# Patient Record
Sex: FEMALE | Race: WHITE | HISPANIC OR LATINO | Employment: OTHER | URBAN - METROPOLITAN AREA
[De-identification: names, ages, dates, MRNs, and addresses within clinical notes are randomized per-mention and may not be internally consistent; named-entity substitution may affect disease eponyms.]

---

## 2017-01-06 ENCOUNTER — COMMUNICATION - HEALTHEAST (OUTPATIENT)
Dept: INTERNAL MEDICINE | Facility: CLINIC | Age: 77
End: 2017-01-06

## 2017-01-12 ENCOUNTER — HOSPITAL ENCOUNTER (OUTPATIENT)
Dept: MRI IMAGING | Facility: CLINIC | Age: 77
Discharge: HOME | End: 2017-01-12
Attending: RADIOLOGY

## 2017-01-12 DIAGNOSIS — D32.9 MENINGIOMA (H): ICD-10-CM

## 2017-01-16 ENCOUNTER — OFFICE VISIT - HEALTHEAST (OUTPATIENT)
Dept: RADIATION ONCOLOGY | Age: 77
End: 2017-01-16

## 2017-01-16 DIAGNOSIS — D32.9 MENINGIOMA (H): ICD-10-CM

## 2017-01-17 ENCOUNTER — COMMUNICATION - HEALTHEAST (OUTPATIENT)
Dept: INTERNAL MEDICINE | Facility: CLINIC | Age: 77
End: 2017-01-17

## 2017-01-17 DIAGNOSIS — E78.5 HYPERLIPIDEMIA: ICD-10-CM

## 2017-02-24 ENCOUNTER — COMMUNICATION - HEALTHEAST (OUTPATIENT)
Dept: INTERNAL MEDICINE | Facility: CLINIC | Age: 77
End: 2017-02-24

## 2017-06-05 ENCOUNTER — OFFICE VISIT - HEALTHEAST (OUTPATIENT)
Dept: INTERNAL MEDICINE | Facility: CLINIC | Age: 77
End: 2017-06-05

## 2017-06-05 DIAGNOSIS — R42 VERTIGO: ICD-10-CM

## 2017-06-05 DIAGNOSIS — D32.9 MENINGIOMA (H): ICD-10-CM

## 2017-06-05 ASSESSMENT — MIFFLIN-ST. JEOR: SCORE: 1062.72

## 2017-07-03 ENCOUNTER — OFFICE VISIT - HEALTHEAST (OUTPATIENT)
Dept: RADIATION ONCOLOGY | Age: 77
End: 2017-07-03

## 2017-07-03 DIAGNOSIS — D32.9 MENINGIOMA (H): ICD-10-CM

## 2017-11-08 ENCOUNTER — COMMUNICATION - HEALTHEAST (OUTPATIENT)
Dept: INTERNAL MEDICINE | Facility: CLINIC | Age: 77
End: 2017-11-08

## 2017-11-09 ENCOUNTER — COMMUNICATION - HEALTHEAST (OUTPATIENT)
Dept: INTERNAL MEDICINE | Facility: CLINIC | Age: 77
End: 2017-11-09

## 2017-11-09 ENCOUNTER — AMBULATORY - HEALTHEAST (OUTPATIENT)
Dept: INTERNAL MEDICINE | Facility: CLINIC | Age: 77
End: 2017-11-09

## 2018-01-29 ENCOUNTER — COMMUNICATION - HEALTHEAST (OUTPATIENT)
Dept: INTERNAL MEDICINE | Facility: CLINIC | Age: 78
End: 2018-01-29

## 2018-01-29 DIAGNOSIS — E78.5 HYPERLIPIDEMIA: ICD-10-CM

## 2018-05-20 ENCOUNTER — COMMUNICATION - HEALTHEAST (OUTPATIENT)
Dept: INTERNAL MEDICINE | Facility: CLINIC | Age: 78
End: 2018-05-20

## 2018-06-15 ENCOUNTER — RECORDS - HEALTHEAST (OUTPATIENT)
Dept: ADMINISTRATIVE | Facility: OTHER | Age: 78
End: 2018-06-15

## 2018-06-15 ENCOUNTER — COMMUNICATION - HEALTHEAST (OUTPATIENT)
Dept: INTERNAL MEDICINE | Facility: CLINIC | Age: 78
End: 2018-06-15

## 2018-06-15 DIAGNOSIS — R42 VERTIGO: ICD-10-CM

## 2018-06-15 DIAGNOSIS — D32.9 MENINGIOMA (H): ICD-10-CM

## 2018-06-20 ENCOUNTER — COMMUNICATION - HEALTHEAST (OUTPATIENT)
Dept: INTERNAL MEDICINE | Facility: CLINIC | Age: 78
End: 2018-06-20

## 2018-07-02 ENCOUNTER — HOSPITAL ENCOUNTER (OUTPATIENT)
Dept: MRI IMAGING | Facility: CLINIC | Age: 78
Discharge: HOME | End: 2018-07-02
Attending: RADIOLOGY

## 2018-07-02 DIAGNOSIS — D32.9 MENINGIOMA (H): ICD-10-CM

## 2018-07-02 LAB
CREAT BLD-MCNC: 0.8 MG/DL
POC GFR AMER AF HE - HISTORICAL: >60 ML/MIN/1.73M2
POC GFR NON AMER AF HE - HISTORICAL: >60 ML/MIN/1.73M2

## 2018-07-10 ENCOUNTER — OFFICE VISIT - HEALTHEAST (OUTPATIENT)
Dept: RADIATION ONCOLOGY | Facility: HOSPITAL | Age: 78
End: 2018-07-10

## 2018-07-10 DIAGNOSIS — D32.9 MENINGIOMA (H): ICD-10-CM

## 2018-07-23 ENCOUNTER — COMMUNICATION - HEALTHEAST (OUTPATIENT)
Dept: INTERNAL MEDICINE | Facility: CLINIC | Age: 78
End: 2018-07-23

## 2018-07-26 ENCOUNTER — OFFICE VISIT - HEALTHEAST (OUTPATIENT)
Dept: INTERNAL MEDICINE | Facility: CLINIC | Age: 78
End: 2018-07-26

## 2018-07-26 DIAGNOSIS — D32.9 MENINGIOMA (H): ICD-10-CM

## 2018-07-26 DIAGNOSIS — H81.10 BPPV (BENIGN PAROXYSMAL POSITIONAL VERTIGO): ICD-10-CM

## 2018-07-26 ASSESSMENT — MIFFLIN-ST. JEOR: SCORE: 1062.36

## 2018-07-27 ENCOUNTER — COMMUNICATION - HEALTHEAST (OUTPATIENT)
Dept: INTERNAL MEDICINE | Facility: CLINIC | Age: 78
End: 2018-07-27

## 2018-07-28 ENCOUNTER — COMMUNICATION - HEALTHEAST (OUTPATIENT)
Dept: INTERNAL MEDICINE | Facility: CLINIC | Age: 78
End: 2018-07-28

## 2018-07-28 DIAGNOSIS — E78.5 HYPERLIPIDEMIA: ICD-10-CM

## 2018-10-08 ENCOUNTER — RECORDS - HEALTHEAST (OUTPATIENT)
Dept: ADMINISTRATIVE | Facility: OTHER | Age: 78
End: 2018-10-08

## 2018-10-10 ENCOUNTER — COMMUNICATION - HEALTHEAST (OUTPATIENT)
Dept: INTERNAL MEDICINE | Facility: CLINIC | Age: 78
End: 2018-10-10

## 2018-10-22 ENCOUNTER — HOSPITAL ENCOUNTER (OUTPATIENT)
Dept: RADIOLOGY | Facility: CLINIC | Age: 78
Discharge: HOME | End: 2018-10-22
Attending: PHYSICIAN ASSISTANT

## 2018-10-22 DIAGNOSIS — R13.10 DYSPHAGIA: ICD-10-CM

## 2018-11-14 ENCOUNTER — OFFICE VISIT - HEALTHEAST (OUTPATIENT)
Dept: INTERNAL MEDICINE | Facility: CLINIC | Age: 78
End: 2018-11-14

## 2018-11-14 ENCOUNTER — HOSPITAL ENCOUNTER (OUTPATIENT)
Dept: LAB | Age: 78
Setting detail: SPECIMEN
Discharge: HOME | End: 2018-11-14

## 2018-11-14 DIAGNOSIS — I69.30 SEQUELA OF LACUNAR INFARCTION: ICD-10-CM

## 2018-11-14 DIAGNOSIS — R42 VERTIGO: ICD-10-CM

## 2018-11-14 DIAGNOSIS — K22.2 ESOPHAGEAL STRICTURE: ICD-10-CM

## 2018-11-14 DIAGNOSIS — D32.9 MENINGIOMA (H): ICD-10-CM

## 2018-11-14 DIAGNOSIS — R10.31 RIGHT INGUINAL PAIN: ICD-10-CM

## 2018-11-14 DIAGNOSIS — Z00.00 ENCOUNTER FOR MEDICARE ANNUAL WELLNESS EXAM: ICD-10-CM

## 2018-11-14 DIAGNOSIS — K21.9 GASTROESOPHAGEAL REFLUX DISEASE, ESOPHAGITIS PRESENCE NOT SPECIFIED: ICD-10-CM

## 2018-11-14 LAB
ALBUMIN SERPL-MCNC: 3.9 G/DL (ref 3.5–5)
ALBUMIN UR-MCNC: NEGATIVE MG/DL
ALP SERPL-CCNC: 90 U/L (ref 45–120)
ALT SERPL W P-5'-P-CCNC: 18 U/L (ref 0–45)
ANION GAP SERPL CALCULATED.3IONS-SCNC: 8 MMOL/L (ref 5–18)
APPEARANCE UR: CLEAR
AST SERPL W P-5'-P-CCNC: 27 U/L (ref 0–40)
BACTERIA #/AREA URNS HPF: ABNORMAL HPF
BASOPHILS # BLD AUTO: 0 THOU/UL (ref 0–0.2)
BASOPHILS NFR BLD AUTO: 0 % (ref 0–2)
BILIRUB DIRECT SERPL-MCNC: 0.2 MG/DL
BILIRUB SERPL-MCNC: 0.5 MG/DL (ref 0–1)
BILIRUB UR QL STRIP: NEGATIVE
BUN SERPL-MCNC: 18 MG/DL (ref 8–28)
CALCIUM SERPL-MCNC: 9.6 MG/DL (ref 8.5–10.5)
CHLORIDE BLD-SCNC: 106 MMOL/L (ref 98–107)
CHOLEST SERPL-MCNC: 196 MG/DL
CO2 SERPL-SCNC: 29 MMOL/L (ref 22–31)
COLOR UR AUTO: YELLOW
CREAT SERPL-MCNC: 0.79 MG/DL (ref 0.6–1.1)
EOSINOPHIL # BLD AUTO: 0.1 THOU/UL (ref 0–0.4)
EOSINOPHIL NFR BLD AUTO: 4 % (ref 0–6)
ERYTHROCYTE [DISTWIDTH] IN BLOOD BY AUTOMATED COUNT: 12 % (ref 11–14.5)
ERYTHROCYTE [SEDIMENTATION RATE] IN BLOOD BY WESTERGREN METHOD: 16 MM/HR (ref 0–20)
FASTING STATUS PATIENT QL REPORTED: YES
GFR SERPL CREATININE-BSD FRML MDRD: >60 ML/MIN/1.73M2
GLUCOSE BLD-MCNC: 97 MG/DL (ref 70–125)
GLUCOSE UR STRIP-MCNC: NEGATIVE MG/DL
HCT VFR BLD AUTO: 41.3 % (ref 35–47)
HDLC SERPL-MCNC: 51 MG/DL
HGB BLD-MCNC: 13.7 G/DL (ref 12–16)
HGB UR QL STRIP: ABNORMAL
KETONES UR STRIP-MCNC: NEGATIVE MG/DL
LDLC SERPL CALC-MCNC: 126 MG/DL
LEUKOCYTE ESTERASE UR QL STRIP: ABNORMAL
LYMPHOCYTES # BLD AUTO: 1.2 THOU/UL (ref 0.8–4.4)
LYMPHOCYTES NFR BLD AUTO: 34 % (ref 20–40)
MCH RBC QN AUTO: 31.8 PG (ref 27–34)
MCHC RBC AUTO-ENTMCNC: 33.1 G/DL (ref 32–36)
MCV RBC AUTO: 96 FL (ref 80–100)
MONOCYTES # BLD AUTO: 0.5 THOU/UL (ref 0–0.9)
MONOCYTES NFR BLD AUTO: 13 % (ref 2–10)
NEUTROPHILS # BLD AUTO: 1.7 THOU/UL (ref 2–7.7)
NEUTROPHILS NFR BLD AUTO: 49 % (ref 50–70)
NITRATE UR QL: NEGATIVE
PH UR STRIP: 7 [PH] (ref 5–8)
PLATELET # BLD AUTO: 163 THOU/UL (ref 140–440)
PMV BLD AUTO: 8.6 FL (ref 7–10)
POTASSIUM BLD-SCNC: 4.6 MMOL/L (ref 3.5–5)
PROT SERPL-MCNC: 6.8 G/DL (ref 6–8)
RBC # BLD AUTO: 4.3 MILL/UL (ref 3.8–5.4)
RBC #/AREA URNS AUTO: ABNORMAL HPF
SODIUM SERPL-SCNC: 143 MMOL/L (ref 136–145)
SP GR UR STRIP: 1.02 (ref 1–1.03)
SQUAMOUS #/AREA URNS AUTO: ABNORMAL LPF
TRIGL SERPL-MCNC: 93 MG/DL
TSH SERPL DL<=0.005 MIU/L-ACNC: 1.37 UIU/ML (ref 0.3–5)
UROBILINOGEN UR STRIP-ACNC: ABNORMAL
WBC #/AREA URNS AUTO: ABNORMAL HPF
WBC: 3.4 THOU/UL (ref 4–11)

## 2018-11-14 ASSESSMENT — MIFFLIN-ST. JEOR: SCORE: 1045.53

## 2018-11-15 LAB — BACTERIA SPEC CULT: NO GROWTH

## 2018-11-16 ENCOUNTER — RECORDS - HEALTHEAST (OUTPATIENT)
Dept: ADMINISTRATIVE | Facility: OTHER | Age: 78
End: 2018-11-16

## 2018-11-18 ENCOUNTER — COMMUNICATION - HEALTHEAST (OUTPATIENT)
Dept: INTERNAL MEDICINE | Facility: CLINIC | Age: 78
End: 2018-11-18

## 2018-11-21 ENCOUNTER — RECORDS - HEALTHEAST (OUTPATIENT)
Dept: ADMINISTRATIVE | Facility: OTHER | Age: 78
End: 2018-11-21

## 2018-12-03 ENCOUNTER — COMMUNICATION - HEALTHEAST (OUTPATIENT)
Dept: INTERNAL MEDICINE | Facility: CLINIC | Age: 78
End: 2018-12-03

## 2019-01-14 ENCOUNTER — RECORDS - HEALTHEAST (OUTPATIENT)
Dept: ADMINISTRATIVE | Facility: OTHER | Age: 79
End: 2019-01-14

## 2019-01-15 ENCOUNTER — RECORDS - HEALTHEAST (OUTPATIENT)
Dept: ADMINISTRATIVE | Facility: OTHER | Age: 79
End: 2019-01-15

## 2019-01-16 ENCOUNTER — RECORDS - HEALTHEAST (OUTPATIENT)
Dept: ADMINISTRATIVE | Facility: OTHER | Age: 79
End: 2019-01-16

## 2019-01-24 ENCOUNTER — COMMUNICATION - HEALTHEAST (OUTPATIENT)
Dept: INTERNAL MEDICINE | Facility: CLINIC | Age: 79
End: 2019-01-24

## 2019-01-24 DIAGNOSIS — K44.9 HIATAL HERNIA: ICD-10-CM

## 2019-01-24 DIAGNOSIS — R13.10 DYSPHAGIA: ICD-10-CM

## 2019-01-25 ENCOUNTER — AMBULATORY - HEALTHEAST (OUTPATIENT)
Dept: INTERNAL MEDICINE | Facility: CLINIC | Age: 79
End: 2019-01-25

## 2019-01-25 DIAGNOSIS — R10.9 CHRONIC ABDOMINAL PAIN: ICD-10-CM

## 2019-01-25 DIAGNOSIS — G89.29 CHRONIC ABDOMINAL PAIN: ICD-10-CM

## 2019-03-14 ENCOUNTER — OFFICE VISIT - HEALTHEAST (OUTPATIENT)
Dept: INTERNAL MEDICINE | Facility: CLINIC | Age: 79
End: 2019-03-14

## 2019-03-14 DIAGNOSIS — F43.21 GRIEF: ICD-10-CM

## 2019-03-14 DIAGNOSIS — K22.4 ESOPHAGEAL DYSFUNCTION: ICD-10-CM

## 2019-03-14 DIAGNOSIS — N83.209 CYST OF OVARY, UNSPECIFIED LATERALITY: ICD-10-CM

## 2019-03-14 DIAGNOSIS — K29.50 CHRONIC GASTRITIS WITHOUT BLEEDING, UNSPECIFIED GASTRITIS TYPE: ICD-10-CM

## 2019-03-14 DIAGNOSIS — E78.5 HYPERLIPIDEMIA, UNSPECIFIED HYPERLIPIDEMIA TYPE: ICD-10-CM

## 2019-03-14 ASSESSMENT — MIFFLIN-ST. JEOR: SCORE: 1050.43

## 2019-03-22 ENCOUNTER — COMMUNICATION - HEALTHEAST (OUTPATIENT)
Dept: INTERNAL MEDICINE | Facility: CLINIC | Age: 79
End: 2019-03-22

## 2019-06-09 ENCOUNTER — COMMUNICATION - HEALTHEAST (OUTPATIENT)
Dept: INTERNAL MEDICINE | Facility: CLINIC | Age: 79
End: 2019-06-09

## 2019-06-09 DIAGNOSIS — G47.00 INSOMNIA, UNSPECIFIED TYPE: ICD-10-CM

## 2019-07-05 ENCOUNTER — HOSPITAL ENCOUNTER (OUTPATIENT)
Dept: MRI IMAGING | Facility: CLINIC | Age: 79
Setting detail: RADIATION/ONCOLOGY SERIES
Discharge: STILL ADMITTED OR ADMINISTRATIVE | End: 2019-07-05
Attending: RADIOLOGY

## 2019-07-05 DIAGNOSIS — D32.9 MENINGIOMA (H): ICD-10-CM

## 2019-07-05 LAB
CREAT BLD-MCNC: 0.7 MG/DL (ref 0.6–1.1)
GFR SERPL CREATININE-BSD FRML MDRD: >60 ML/MIN/1.73M2

## 2019-07-09 ENCOUNTER — OFFICE VISIT - HEALTHEAST (OUTPATIENT)
Dept: RADIATION ONCOLOGY | Facility: HOSPITAL | Age: 79
End: 2019-07-09

## 2019-07-09 DIAGNOSIS — D32.9 MENINGIOMA (H): ICD-10-CM

## 2019-07-24 ENCOUNTER — RECORDS - HEALTHEAST (OUTPATIENT)
Dept: ADMINISTRATIVE | Facility: OTHER | Age: 79
End: 2019-07-24

## 2019-08-02 ENCOUNTER — COMMUNICATION - HEALTHEAST (OUTPATIENT)
Dept: INTERNAL MEDICINE | Facility: CLINIC | Age: 79
End: 2019-08-02

## 2019-08-02 DIAGNOSIS — E78.5 HYPERLIPIDEMIA: ICD-10-CM

## 2019-09-26 ENCOUNTER — RECORDS - HEALTHEAST (OUTPATIENT)
Dept: ADMINISTRATIVE | Facility: OTHER | Age: 79
End: 2019-09-26

## 2019-12-12 ENCOUNTER — COMMUNICATION - HEALTHEAST (OUTPATIENT)
Dept: INTERNAL MEDICINE | Facility: CLINIC | Age: 79
End: 2019-12-12

## 2019-12-12 DIAGNOSIS — G47.00 INSOMNIA, UNSPECIFIED TYPE: ICD-10-CM

## 2020-01-10 ENCOUNTER — OFFICE VISIT - HEALTHEAST (OUTPATIENT)
Dept: INTERNAL MEDICINE | Facility: CLINIC | Age: 80
End: 2020-01-10

## 2020-01-10 DIAGNOSIS — N83.209 CYST OF OVARY, UNSPECIFIED LATERALITY: ICD-10-CM

## 2020-01-10 DIAGNOSIS — D32.9 MENINGIOMA (H): ICD-10-CM

## 2020-01-10 DIAGNOSIS — Z00.00 MEDICARE ANNUAL WELLNESS VISIT, SUBSEQUENT: ICD-10-CM

## 2020-01-10 DIAGNOSIS — K22.4 ESOPHAGEAL DYSFUNCTION: ICD-10-CM

## 2020-01-10 DIAGNOSIS — E78.5 HYPERLIPIDEMIA, UNSPECIFIED HYPERLIPIDEMIA TYPE: ICD-10-CM

## 2020-01-10 DIAGNOSIS — F51.04 CHRONIC INSOMNIA: ICD-10-CM

## 2020-01-10 DIAGNOSIS — Z83.3 FAMILY HISTORY OF DIABETES MELLITUS: ICD-10-CM

## 2020-01-10 LAB
ALBUMIN SERPL-MCNC: 4 G/DL (ref 3.5–5)
ALBUMIN UR-MCNC: NEGATIVE MG/DL
ALP SERPL-CCNC: 77 U/L (ref 45–120)
ALT SERPL W P-5'-P-CCNC: 12 U/L (ref 0–45)
ANION GAP SERPL CALCULATED.3IONS-SCNC: 8 MMOL/L (ref 5–18)
APPEARANCE UR: CLEAR
AST SERPL W P-5'-P-CCNC: 20 U/L (ref 0–40)
BACTERIA #/AREA URNS HPF: ABNORMAL HPF
BASOPHILS # BLD AUTO: 0 THOU/UL (ref 0–0.2)
BASOPHILS NFR BLD AUTO: 0 % (ref 0–2)
BILIRUB DIRECT SERPL-MCNC: 0.2 MG/DL
BILIRUB SERPL-MCNC: 0.6 MG/DL (ref 0–1)
BILIRUB UR QL STRIP: NEGATIVE
BUN SERPL-MCNC: 15 MG/DL (ref 8–28)
CALCIUM SERPL-MCNC: 9.5 MG/DL (ref 8.5–10.5)
CHLORIDE BLD-SCNC: 107 MMOL/L (ref 98–107)
CHOLEST SERPL-MCNC: 193 MG/DL
CO2 SERPL-SCNC: 26 MMOL/L (ref 22–31)
COLOR UR AUTO: YELLOW
CREAT SERPL-MCNC: 0.8 MG/DL (ref 0.6–1.1)
EOSINOPHIL # BLD AUTO: 0.1 THOU/UL (ref 0–0.4)
EOSINOPHIL NFR BLD AUTO: 2 % (ref 0–6)
ERYTHROCYTE [DISTWIDTH] IN BLOOD BY AUTOMATED COUNT: 12.2 % (ref 11–14.5)
ERYTHROCYTE [SEDIMENTATION RATE] IN BLOOD BY WESTERGREN METHOD: 11 MM/HR (ref 0–20)
FASTING STATUS PATIENT QL REPORTED: YES
GFR SERPL CREATININE-BSD FRML MDRD: >60 ML/MIN/1.73M2
GLUCOSE BLD-MCNC: 92 MG/DL (ref 70–125)
GLUCOSE UR STRIP-MCNC: NEGATIVE MG/DL
HBA1C MFR BLD: 5.8 % (ref 3.5–6)
HCT VFR BLD AUTO: 39.4 % (ref 35–47)
HDLC SERPL-MCNC: 45 MG/DL
HGB BLD-MCNC: 13.6 G/DL (ref 12–16)
HGB UR QL STRIP: ABNORMAL
KETONES UR STRIP-MCNC: NEGATIVE MG/DL
LDLC SERPL CALC-MCNC: 122 MG/DL
LEUKOCYTE ESTERASE UR QL STRIP: ABNORMAL
LYMPHOCYTES # BLD AUTO: 1.4 THOU/UL (ref 0.8–4.4)
LYMPHOCYTES NFR BLD AUTO: 36 % (ref 20–40)
MCH RBC QN AUTO: 34 PG (ref 27–34)
MCHC RBC AUTO-ENTMCNC: 34.6 G/DL (ref 32–36)
MCV RBC AUTO: 98 FL (ref 80–100)
MONOCYTES # BLD AUTO: 0.4 THOU/UL (ref 0–0.9)
MONOCYTES NFR BLD AUTO: 11 % (ref 2–10)
MUCOUS THREADS #/AREA URNS LPF: ABNORMAL LPF
NEUTROPHILS # BLD AUTO: 2 THOU/UL (ref 2–7.7)
NEUTROPHILS NFR BLD AUTO: 50 % (ref 50–70)
NITRATE UR QL: NEGATIVE
PH UR STRIP: 6 [PH] (ref 4.5–8)
PLATELET # BLD AUTO: 142 THOU/UL (ref 140–440)
PMV BLD AUTO: 7.9 FL (ref 7–10)
POTASSIUM BLD-SCNC: 4.4 MMOL/L (ref 3.5–5)
PROT SERPL-MCNC: 7.2 G/DL (ref 6–8)
RBC # BLD AUTO: 4.01 MILL/UL (ref 3.8–5.4)
RBC #/AREA URNS AUTO: ABNORMAL HPF
SODIUM SERPL-SCNC: 141 MMOL/L (ref 136–145)
SP GR UR STRIP: 1.01 (ref 1–1.03)
SQUAMOUS #/AREA URNS AUTO: ABNORMAL LPF
TRIGL SERPL-MCNC: 131 MG/DL
TSH SERPL DL<=0.005 MIU/L-ACNC: 1.46 UIU/ML (ref 0.3–5)
UROBILINOGEN UR STRIP-ACNC: ABNORMAL
WBC #/AREA URNS AUTO: ABNORMAL HPF
WBC: 3.9 THOU/UL (ref 4–11)

## 2020-01-10 ASSESSMENT — MIFFLIN-ST. JEOR: SCORE: 1040.99

## 2020-01-11 LAB — BACTERIA SPEC CULT: NO GROWTH

## 2020-01-12 ENCOUNTER — COMMUNICATION - HEALTHEAST (OUTPATIENT)
Dept: INTERNAL MEDICINE | Facility: CLINIC | Age: 80
End: 2020-01-12

## 2020-01-31 ENCOUNTER — COMMUNICATION - HEALTHEAST (OUTPATIENT)
Dept: INTERNAL MEDICINE | Facility: CLINIC | Age: 80
End: 2020-01-31

## 2020-01-31 DIAGNOSIS — E78.5 HYPERLIPIDEMIA: ICD-10-CM

## 2020-04-23 ENCOUNTER — COMMUNICATION - HEALTHEAST (OUTPATIENT)
Dept: INTERNAL MEDICINE | Facility: CLINIC | Age: 80
End: 2020-04-23

## 2020-04-23 DIAGNOSIS — G47.00 INSOMNIA, UNSPECIFIED TYPE: ICD-10-CM

## 2020-04-28 ENCOUNTER — COMMUNICATION - HEALTHEAST (OUTPATIENT)
Dept: INTERNAL MEDICINE | Facility: CLINIC | Age: 80
End: 2020-04-28

## 2020-08-04 ENCOUNTER — HOSPITAL ENCOUNTER (OUTPATIENT)
Dept: MRI IMAGING | Facility: CLINIC | Age: 80
Discharge: HOME | End: 2020-08-04
Attending: RADIOLOGY

## 2020-08-04 DIAGNOSIS — D32.9 MENINGIOMA (H): ICD-10-CM

## 2020-08-04 LAB
CREAT BLD-MCNC: 0.9 MG/DL (ref 0.6–1.1)
GFR SERPL CREATININE-BSD FRML MDRD: 60 ML/MIN/1.73M2

## 2020-08-10 ENCOUNTER — COMMUNICATION - HEALTHEAST (OUTPATIENT)
Dept: RADIATION ONCOLOGY | Facility: HOSPITAL | Age: 80
End: 2020-08-10

## 2020-08-10 ENCOUNTER — AMBULATORY - HEALTHEAST (OUTPATIENT)
Dept: RADIATION ONCOLOGY | Facility: HOSPITAL | Age: 80
End: 2020-08-10

## 2020-08-10 DIAGNOSIS — D32.9 MENINGIOMA (H): ICD-10-CM

## 2020-11-09 ENCOUNTER — COMMUNICATION - HEALTHEAST (OUTPATIENT)
Dept: INTERNAL MEDICINE | Facility: CLINIC | Age: 80
End: 2020-11-09

## 2020-11-09 DIAGNOSIS — G47.00 INSOMNIA, UNSPECIFIED TYPE: ICD-10-CM

## 2020-11-24 ENCOUNTER — OFFICE VISIT - HEALTHEAST (OUTPATIENT)
Dept: INTERNAL MEDICINE | Facility: CLINIC | Age: 80
End: 2020-11-24

## 2020-11-24 ENCOUNTER — RECORDS - HEALTHEAST (OUTPATIENT)
Dept: ADMINISTRATIVE | Facility: OTHER | Age: 80
End: 2020-11-24

## 2020-11-24 ENCOUNTER — COMMUNICATION - HEALTHEAST (OUTPATIENT)
Dept: INTERNAL MEDICINE | Facility: CLINIC | Age: 80
End: 2020-11-24

## 2020-11-24 DIAGNOSIS — Z00.00 HEALTH MAINTENANCE EXAMINATION: ICD-10-CM

## 2020-11-24 DIAGNOSIS — G47.00 INSOMNIA, UNSPECIFIED TYPE: ICD-10-CM

## 2020-11-24 DIAGNOSIS — D32.9 MENINGIOMA (H): ICD-10-CM

## 2020-11-24 RX ORDER — TEMAZEPAM 15 MG/1
CAPSULE ORAL
Qty: 90 CAPSULE | Refills: 3 | Status: SHIPPED | OUTPATIENT
Start: 2020-11-24 | End: 2021-12-20

## 2020-11-24 ASSESSMENT — PATIENT HEALTH QUESTIONNAIRE - PHQ9: SUM OF ALL RESPONSES TO PHQ QUESTIONS 1-9: 0

## 2021-01-28 ENCOUNTER — COMMUNICATION - HEALTHEAST (OUTPATIENT)
Dept: FAMILY MEDICINE | Facility: CLINIC | Age: 81
End: 2021-01-28

## 2021-01-28 DIAGNOSIS — E78.5 HYPERLIPIDEMIA: ICD-10-CM

## 2021-01-28 RX ORDER — PRAVASTATIN SODIUM 20 MG
20 TABLET ORAL DAILY
Qty: 90 TABLET | Refills: 3 | Status: SHIPPED | OUTPATIENT
Start: 2021-01-28 | End: 2022-01-26

## 2021-02-04 ENCOUNTER — AMBULATORY - HEALTHEAST (OUTPATIENT)
Dept: NURSING | Facility: CLINIC | Age: 81
End: 2021-02-04

## 2021-02-25 ENCOUNTER — AMBULATORY - HEALTHEAST (OUTPATIENT)
Dept: NURSING | Facility: CLINIC | Age: 81
End: 2021-02-25

## 2021-05-26 NOTE — TELEPHONE ENCOUNTER
Spoke with the patient and she states that she didn't care who she saw for a pre-op.  Was able to schedule the patient for a pre-op with Dr. Son for Monday, 3/25/19 at 1:40 pm.  She verbalized understanding and had no further questions at this time.  Julianna MEDELLIN CMA/PRABHA....................10:41 AM

## 2021-05-26 NOTE — TELEPHONE ENCOUNTER
New Appointment Needed  What is the reason for the visit:    Pre-Op Appt Request  When is the surgery? :  03/27/19  Where is the surgery?:   [facility]  Who is the surgeon? :  Dr. Kathy Hinojosa  What type of surgery is being done?: Hiatal hernia repair  Provider Preference: Any available  How soon do you need to be seen?: 03/25 or 03/26  Waitlist offered?: No  Okay to leave a detailed message:  Yes

## 2021-05-27 ASSESSMENT — PATIENT HEALTH QUESTIONNAIRE - PHQ9: SUM OF ALL RESPONSES TO PHQ QUESTIONS 1-9: 0

## 2021-05-29 NOTE — TELEPHONE ENCOUNTER
Controlled Substance Refill Request  Medication:   Requested Prescriptions     Pending Prescriptions Disp Refills     temazepam (RESTORIL) 15 mg capsule [Pharmacy Med Name: TEMAZEPAM 15MG CAPSULES] 30 capsule 0     Sig: TAKE 1 CAPSULE(15 MG) BY MOUTH AT BEDTIME AS NEEDED FOR SLEEP     Date Last Fill: 12/5/18  [facility] [address] [place] [address]   Submit electronically to pharmacy  Controlled Substance Agreement on File:   Encounter-Level CSA Scan Date:    There are no encounter-level csa scan date.       Last office visit: Last office visit pertaining to requested medication was 11/30/2016.  Alisa Garcia RN, MA  [facility] Care Connection RN Triage Nurse Advisor

## 2021-05-29 NOTE — TELEPHONE ENCOUNTER
Prescription Monitoring Program activity reviewed with no discrepancies noted.      Last fill per : 12/05/18  Quantity/days supply: #30 for a 30 day supply    Controlled Substance Agreement on file: No  Date:     Last office visit with provider:  3/14/2019 Conner Cordero MD    Please advise.

## 2021-05-30 VITALS — HEIGHT: 62 IN | BODY MASS INDEX: 26.15 KG/M2 | WEIGHT: 142.08 LBS

## 2021-05-30 VITALS — BODY MASS INDEX: 26.36 KG/M2 | WEIGHT: 141.8 LBS

## 2021-05-30 NOTE — PROGRESS NOTES
[facility] Radiation Oncology Follow Up Note    Patient: Emili Man  MRN: [id number]  Date of Service: 07/09/2019    Assessment / Impression     1. Meningioma (right para clinoid)         Cancer Staging  No matching staging information was found for the patient.  ECOG Peformance Status  ECOG Performance Status: 0  Distress Assessment Score  Distress Assessment Score: No distress  Body site: Brain    Plan:   78 y.o. S/P CK radiation to right paraclinoid region, 2500 cGy/5 fractions completed on 7/8/15.     1. MRI Head w/wo contrast 7/5/2019, tumor remains stable with patent vessels. Imaging results discussed with patient.  2. Return in 1 year for routine MR head w/wo contrast and office visit.   3. Describing light headedness and dizziness in the morning, worsening with bending over. Not similar to her previous vertiginous episodes. Also some voice hoarseness. Transit defect noted mid esophagus function testing from 2/2019. Chronic gastritis seen on endoscopy on 1/14/2019. Possibly related to GI symptoms, follow up with GI clinic. Nothing to suggest inner ear problem today in clinic today or on MRI head.    Face to face time  50 minutes with > 75% spent on consultation, education and coordination of care.    Subjective:      HPI: Emili Man is a 78 y.o. female was treated with Cyberknife stereotactic radiosurgery for a meningioma located in right prerclinoid region 2.5x1.8x2.2 cm adjacent to chiasm, right optic nerve, encasing regional arteries and invasive locally precluding surgical intervention.       SITE TREATED: right para clinoid region    TOTAL DOSE: 2500    NUMBER OF FRACTION: 5    DATES COMPLETED: 7/8/2015    Emili tolerated the treatment without unexpected sides effects.   No visual sx. Had left cataract removed with trifocal replacement. Had the other eye done and is very happy with result. No visual changes.  Scan 1/12/2017 shows stable to slightly smaller. Vessels patent.      Patient was seen  in ER recently for vertiginous episode. Normal exam. Scan 5/23/2017 showed tumor stability and vessels remain patent. So no obvious change on scan to explain sx.  None since.      The patient returns for routine follow up. She describes feeling off balance and light headedness, similar to her vertiginous episodes but less severe, which occasionally occur in the morning. Worsening when bending over. Also some voice hoarseness. No dysphagia. No focal weakness or vertiginous episodes similar to past. MR head 7/5/2018 showing stable tumor with patent vessels.      Current Outpatient Medications   Medication Sig Dispense Refill     pravastatin (PRAVACHOL) 20 MG tablet Take 1 tablet (20 mg total) by mouth daily. 90 tablet 3     temazepam (RESTORIL) 15 mg capsule TAKE 1 CAPSULE(15 MG) BY MOUTH AT BEDTIME AS NEEDED FOR SLEEP 30 capsule 0     No current facility-administered medications for this visit.        The following portions of the patient's history were reviewed and updated as appropriate: allergies, current medications, past family history, past medical history, past social history, past surgical history and problem list.    Review of Systems    General  Constitutional (WDL): All constitutional elements are within defined limits  EENT  Eye Disorder (WDL): All eye disorder elements are within defined limits  Ear Disorder (WDL): All ear disorder elements are within defined limits  Respiratory       Respiratory (WDL): Within Defined Limits  Cardiovascular  Cardiovascular (WDL): All cardiovascular elements are within defined limits  Endocrine     Gastrointestinal  Gastrointestinal (WDL): All gastrointestinal elements are within defined limits  Musculoskeletal  Musculoskeletal and Connetive Tissue Disorders (WDL): All Musculoskeletal and Connetive Tissue Disorder elements are within defined limits  Integumentary               Integumentary (WDL): All integumentary elements are within defined  limits  Neurological  Neurosensory (WDL): Exceptions to WDL  Dizziness: Mild unsteadiness or sensation of movement(occasional, not interfering with ADLs)  Psychological/Emotional   Patient Coping: Accepting  Hematological/Lymphatic  Lymph (WDL): All lymph disorder elements are within defined limits  Dermatologic     Genitourinary/Reproductive  Genitourinary (WDL): All genitourinary elements are within defined limits  Reproductive     Pain                 AUA Assessment                                  Accompanied by  Accompanied by: Alone      Objective:     Physical Exam    Vitals:    07/09/19 1531   BP: 139/77   Pulse: 67   Weight: 135 lb 12.8 oz (61.6 kg)        GENERAL: No acute distress. Cooperative in conversation.   HEENT: Pupils are equal, round and reactive. Oromucosa is clean and intact. No ulcerations or mucositis noted. No bleeding noted.  RESP: Normal respiratory rate.  CV: Regular, rate and rhythm.  ABD: Soft, nontender.  MUSCULOSKELETAL: No palpable points of tenderness.   PSYCH: Within normal limits. No depression or anxiety.  SKIN: Warm dry intact.   LYMPH: No cervical, supraclavicular lymphadenopathy.  EXTREMITIES: No edema .  NEUROLOGIC: CNIII-XII symmetric, normal strength, sensation and reflexes throughout, gait normal. Dysmetria not present.       Recent Labs:   Recent Results (from the past 168 hour(s))   POCT CREATININE   Result Value Ref Range    iSTAT Creatinine 0.7 0.6 - 1.1 mg/dL    iSTAT GFR MDRD Af Amer >60 >60 mL/min/1.73m2    iSTAT GFR MDRD Non Af Amer >60 >60 mL/min/1.73m2       Imaging: Imaging results 30 days: Mr Brain With Without Contrast    Result Date: 7/5/2019  EXAM: MR BRAIN W WO CONTRAST LOCATION: [facility] DATE/TIME: 7/5/2019 11:47 AM INDICATION: Neoplasm: head, CNS, rx monitor or f/u meningioma s/p Cyber please comment on proximity to optic chiasm. COMPARISON: Brain MRI 07/02/2018. CONTRAST: 6 mL Gadavist. TECHNIQUE: Multiplanar multisequence head MRI without  and with intravenous contrast including dynamic susceptibility contrast perfusion imaging. FINDINGS: INTRACRANIAL CONTENTS: No acute or subacute infarct. Enhancing extra axial mass centered at the right anterior clinoid process measuring 2.8 x 1.7 x 2.5 cm, previously 2.7 x 1.8 x 2.4 cm, not significantly changed allowing for differences in imaging plane. Hyperostosis of the clinoid process. This is again noted to encase the supraclinoid internal carotid artery and proximal M1 segment. Mass effect with mild deformity of the inferior frontal lobe. The medial margin of the mass abuts the lateral aspect of the optic chiasm with mild deformity. It also abuts the cisternal segment of the right optic nerve. These findings are not significantly changed. Suggestion of mild elevated cerebral blood volume on perfusion imaging. Normal brain parenchymal signal for age. Mild generalized cerebral atrophy. No hydrocephalus. Chronic lacunar infarction left inferior cerebellar hemisphere. SELLA: No significant abnormality accounting for technique. BONES/SOFT TISSUES: Redemonstration of a degenerative cyst at the base of the odontoid. Thinning of the posterior cortex of the base of the odontoid appears improved compared to the previous study. Persistent retro odontoid pannus. The major intracranial  vascular flow voids are maintained. ORBITS: Prior bilateral cataract surgery. Visualized portions of the orbits are otherwise unremarkable. SINUSES/MASTOIDS: No significant paranasal sinus mucosal disease. No significant middle ear or mastoid effusion.     CONCLUSION: 1.  Unchanged presumed right anterior clinoid meningioma measuring 2.8 x 1.7 x 2.5 cm. Unchanged encasement of the right supraclinoid internal carotid artery and M1 segment with preservation of flow voids. The medial margin of the mass abuts and mildly deforms the optic chiasm and also abuts the cisternal segment of the right optic nerve. Mild deformity of the inferior  frontal lobe.       I, Damaris Hoang MD personally performed the services described in this documentation, as scribed by Ariel Lowe in my presence, and it is both accurate and complete.    Signed by: Damaris Hoang MD, MPH

## 2021-05-30 NOTE — PROGRESS NOTES
Pt here ambulatory by herself for her annual f/u on her meningioma, c/o occasionally dizziness but otherwise no neuro changes or symptoms.

## 2021-05-31 NOTE — TELEPHONE ENCOUNTER
Refill Approved    Rx renewed per Medication Renewal Policy. Medication was last renewed on 7/29/18.    Natalia Ramos, [facility] Triage/Med Refill 8/2/2019     Requested Prescriptions   Pending Prescriptions Disp Refills     pravastatin (PRAVACHOL) 20 MG tablet [Pharmacy Med Name: PRAVASTATIN 20MG TABLETS] 90 tablet 0     Sig: TAKE 1 TABLET(20 MG) BY MOUTH DAILY       Statins Refill Protocol (Hmg CoA Reductase Inhibitors) Passed - 8/2/2019  9:12 AM        Passed - PCP or prescribing provider visit in past 12 months      Last office visit with prescriber/PCP: 3/14/2019 Conner Cordero MD OR same dept: 3/14/2019 Conner Cordero MD OR same specialty: 3/14/2019 Conner Cordero MD  Last physical: 11/14/2018 Last MTM visit: Visit date not found   Next visit within 3 mo: Visit date not found  Next physical within 3 mo: Visit date not found  Prescriber OR PCP: Conner Cordero MD  Last diagnosis associated with med order: 1. Hyperlipidemia  - pravastatin (PRAVACHOL) 20 MG tablet [Pharmacy Med Name: PRAVASTATIN 20MG TABLETS]; TAKE 1 TABLET(20 MG) BY MOUTH DAILY  Dispense: 90 tablet; Refill: 0    If protocol passes may refill for 12 months if within 3 months of last provider visit (or a total of 15 months).

## 2021-06-01 VITALS — BODY MASS INDEX: 26.37 KG/M2 | WEIGHT: 144.2 LBS

## 2021-06-01 VITALS — WEIGHT: 142 LBS | HEIGHT: 62 IN | BODY MASS INDEX: 26.13 KG/M2

## 2021-06-02 VITALS — HEIGHT: 62 IN | BODY MASS INDEX: 25.97 KG/M2 | WEIGHT: 141.12 LBS

## 2021-06-02 VITALS — WEIGHT: 140.04 LBS | HEIGHT: 62 IN | BODY MASS INDEX: 25.77 KG/M2

## 2021-06-03 VITALS — WEIGHT: 135.8 LBS | BODY MASS INDEX: 25.24 KG/M2

## 2021-06-04 VITALS
SYSTOLIC BLOOD PRESSURE: 130 MMHG | DIASTOLIC BLOOD PRESSURE: 84 MMHG | HEIGHT: 62 IN | HEART RATE: 81 BPM | BODY MASS INDEX: 25.59 KG/M2 | WEIGHT: 139.04 LBS | OXYGEN SATURATION: 100 %

## 2021-06-04 NOTE — TELEPHONE ENCOUNTER
Controlled Substance Refill Request  Medication:   Requested Prescriptions     Pending Prescriptions Disp Refills     temazepam (RESTORIL) 15 mg capsule [Pharmacy Med Name: TEMAZEPAM 15MG CAPSULES] 30 capsule 0     Sig: TAKE 1 CAPSULE(15 MG) BY MOUTH AT BEDTIME AS NEEDED FOR SLEEP     Date Last Fill: 6/10/19  [id number]   Submit electronically to pharmacy  Controlled Substance Agreement on File:   Encounter-Level CSA Scan Date:    There are no encounter-level csa scan date.       Last office visit: 3/14/19    Nilsa Burns RN  Triage Nurse Advisor

## 2021-06-05 NOTE — PROGRESS NOTES
Assessment and Plan:        1. Medicare annual wellness visit, subsequent  Completed.  Healthy woman healthy lifestyle  - HM1(CBC and Differential)  - Urinalysis-UC if Indicated  - Basic Metabolic Panel  - Hepatic Profile  - Lipid Cascade  - Thyroid Cascade  - Erythrocyte Sedimentation Rate  - HM1 (CBC with Diff)    2. Cyst of ovary, unspecified laterality  Unchanged on follow-up ultrasounds.  Asymptomatic.  Annual GYN check    3. Hyperlipidemia, unspecified hyperlipidemia type  On Rx.  No cardiovascular issues    4. Esophageal dysfunction  With hiatal hernia.  Mild.  Unchanged    5. Meningioma (right para clinoid)   Stable.  Annual MR.  No clinical symptomatology    6. Chronic insomnia  Occasional sleeping aid-Restoril.  Chronic long-term.  Medication refill.  Uses 2-3 a week    7. Family history of diabetes mellitus  Check sugar A1c  - Glycosylated Hemoglobin A1c     The patient's current medical problems were reviewed.      In addition to the wellness examination additional time was spent addressing the following listed problems.   Listed above    In doing so, this provider spent greater than 25 min. face-to-face time with the patient and/or his family.  More than half this time was spent in counseling and or coordination of care which was consistent with the nature of this patient's problems which are listed and described in the assessment and plan.          Conner Cordero MD  Internal medicine  [facility]  [phone]  [email or web address]      The following health maintenance schedule was reviewed with the patient and provided in printed form in the after visit summary:   Health Maintenance   Topic Date Due     DXA SCAN  11/14/2004     ZOSTER VACCINES (2 of 3) 12/10/2007     PNEUMOCOCCAL IMMUNIZATION 65+ LOW/MEDIUM RISK (2 of 2 - PCV13) 12/19/2012     MEDICARE ANNUAL WELLNESS VISIT  11/14/2019     ADVANCE CARE PLANNING  10/06/2020     FALL RISK ASSESSMENT  01/10/2021      TD 18+ HE  2022     LIPID  2023     INFLUENZA VACCINE RULE BASED  Completed        Subjective:   Chief Complaint: Emili Man is an 79 y.o. female here for an Annual Wellness visit.   HPI: In for wellness    Her sons father Sotero  of a glioblastoma in July.  She helped care for him final 3 months of his life.  Her son is doing okay    She remains active.  Exercises regularly    She has ovarian cysts.  Asymptomatic.  Followed by GYN.      She does have occasional reflux symptoms and dysphasia.  This is unchanged.  She has a moderate hiatal hernia.    PPI acid helped.  Upper GI endoscopy is unremarkable.  This is not been progressive.    Review of Systems:     Family history of diabetes.  This concerns her.  Her last blood sugar here was normal.  No polydipsia polyuria vision problems please see above.  The rest of the review of systems are negative for all systems.    Patient Care Team:  Conner Cordero MD as PCP - General (Internal Medicine)  Conner Cordero MD as Assigned PCP     Patient Active Problem List   Diagnosis     Meningioma (right para clinoid)      Esophageal dysfunction     Hyperlipidemia, unspecified hyperlipidemia type     Cyst of ovary, unspecified laterality     Past Medical History:   Diagnosis Date     High cholesterol      Meningioma (H) 2015    cyberknife irradiation     Osteoarthritis      Positive anti-CCP test      Vertigo       Past Surgical History:   Procedure Laterality Date     CATARACT EXTRACTION, BILATERAL Bilateral 2016     DILATION AND CURETTAGE OF UTERUS  6/3/03     hemrroidectomy       HERNIA REPAIR       HERNIA REPAIR  89 & 07     TUBAL LIGATION  75      Family History   Problem Relation Age of Onset     Diabetes Mother      Diabetes Father      Prostate cancer Father      No Medical Problems Sister      Heart disease Brother      No Medical Problems Daughter      No Medical Problems Son      No Medical Problems Maternal Aunt      No  Medical Problems Maternal Uncle      No Medical Problems Paternal Aunt      No Medical Problems Paternal Uncle      No Medical Problems Maternal Grandmother      No Medical Problems Maternal Grandfather      No Medical Problems Paternal Grandmother      No Medical Problems Paternal Grandfather       Social History     Socioeconomic History     Marital status: Single     Spouse name: Not on file     Number of children: Not on file     Years of education: Not on file     Highest education level: Not on file   Occupational History     Not on file   Social Needs     Financial resource strain: Not on file     Food insecurity:     Worry: Not on file     Inability: Not on file     Transportation needs:     Medical: Not on file     Non-medical: Not on file   Tobacco Use     Smoking status: Never Smoker     Smokeless tobacco: Never Used   Substance and Sexual Activity     Alcohol use: Yes     Comment: socially     Drug use: No     Sexual activity: Not on file   Lifestyle     Physical activity:     Days per week: Not on file     Minutes per session: Not on file     Stress: Not on file   Relationships     Social connections:     Talks on phone: Not on file     Gets together: Not on file     Attends Temple service: Not on file     Active member of club or organization: Not on file     Attends meetings of clubs or organizations: Not on file     Relationship status: Not on file     Intimate partner violence:     Fear of current or ex partner: Not on file     Emotionally abused: Not on file     Physically abused: Not on file     Forced sexual activity: Not on file   Other Topics Concern     Not on file   Social History Narrative    Retired [employer] 1995    Active woman    Son-Noah Keane-1962    Grandson-Sid    Granddaughter-[facility]      Current Outpatient Medications   Medication Sig Dispense Refill     pravastatin (PRAVACHOL) 20 MG tablet TAKE 1 TABLET(20 MG) BY MOUTH DAILY 90 tablet 1     temazepam  "(RESTORIL) 15 mg capsule TAKE 1 CAPSULE(15 MG) BY MOUTH AT BEDTIME AS NEEDED FOR SLEEP 30 capsule 0     No current facility-administered medications for this visit.       Objective:   Vital Signs:   Visit Vitals  /84 (Patient Site: Right Arm, Patient Position: Sitting, Cuff Size: Adult Regular)   Pulse 81   Ht 5' 1.5\" (1.562 m)   Wt 139 lb 0.6 oz (63.1 kg)   LMP 11/30/1991 (Approximate)   SpO2 100%   Breastfeeding No   BMI 25.85 kg/m         VisionScreening:  No exam data present     PHYSICAL EXAM  Very pleasant.  Appears healthy.  Well tone muscles    Skin: Normal. No rash or lesion  Lymph Nodes: None palpable-including neck, axilla, inguinal, epitrochlear.  Head:  Normocephalic.    Eyes: Midline.  Equal size., full ROM.  External exams normal.  No icterus  Ears:  Normal pinnae, canals, and TM's.    Nose:  Patent, without deformity.    Throat:  Moist mucous membranes without lesions, erythema, or exudate.    Neck: No palpable masses, lymphadenopathy or tenderness.No thyromegaly or goiter.  No thyroid nodule.  Carotid Arteries:  No Bruit.  Carotid upstroke normal  Axilla-negative lymph nodes  Breast-normal without mass  Chest Wall: No deformity or pain elicited on compression.  Respiratory:  Normal respiratory effort.  Lungs are clear with good breath sounds.  No dullness.  No wheezing.  Heart: Regular rhythm.  Normal sounding S1, S2 without S3, S4, murmurs, rubs, or gallops.    Abdomen:  The abdomen was flat, soft and nontender without guarding rebound or masses.  There are normal bowel sounds.  There is no hepatosplenomegaly.  There is no palpable enlargement of the aorta.  Pelvic rectal  per GYN-Dr. Rose  Extremities:  Full ROM without limitation, deformity or edema.    4+ pulses  Neurologic-intact- No focal deficit.  Speech clear.  Coordination normal.  Strength symmetric  Orthopedic-no arthropathy.    Reviewed ultrasound [facility]  Assessment Results 1/10/2020   Activities of Daily Living No " help needed   Instrumental Activities of Daily Living No help needed   Get Up and Go Score Less than 12 seconds   Mini Cog Total Score 4   Some recent data might be hidden     A Mini-Cog score of 0-2 suggests the possibility of dementia, score of 3-5 suggests no dementia    Identified Health Risks:     The patient reports that she drinks more than one alcoholic drink per day but denies binge or excessive drinking. She was counseled and given information about possible harmful effects of excessive alcohol intake.  The patient was counseled and encouraged to consider modifying their diet and eating habits. She was provided with information on recommended healthy diet options.  Patient's advanced directive was discussed

## 2021-06-05 NOTE — TELEPHONE ENCOUNTER
Refill Approved    Rx renewed per Medication Renewal Policy. Medication was last renewed on 8/2/19.    Natalia Ramos, [facility] Triage/Med Refill 2/1/2020     Requested Prescriptions   Pending Prescriptions Disp Refills     pravastatin (PRAVACHOL) 20 MG tablet [Pharmacy Med Name: PRAVASTATIN 20MG TABLETS] 90 tablet 1     Sig: TAKE 1 TABLET(20 MG) BY MOUTH DAILY       Statins Refill Protocol (Hmg CoA Reductase Inhibitors) Passed - 1/31/2020  8:33 AM        Passed - PCP or prescribing provider visit in past 12 months      Last office visit with prescriber/PCP: 3/14/2019 Conner Cordero MD OR same dept: 3/14/2019 Conner Cordero MD OR same specialty: 3/14/2019 Conner Cordero MD  Last physical: 1/10/2020 Last MTM visit: Visit date not found   Next visit within 3 mo: Visit date not found  Next physical within 3 mo: Visit date not found  Prescriber OR PCP: Conner Cordero MD  Last diagnosis associated with med order: 1. Hyperlipidemia  - pravastatin (PRAVACHOL) 20 MG tablet [Pharmacy Med Name: PRAVASTATIN 20MG TABLETS]; TAKE 1 TABLET(20 MG) BY MOUTH DAILY  Dispense: 90 tablet; Refill: 1    If protocol passes may refill for 12 months if within 3 months of last provider visit (or a total of 15 months).

## 2021-06-07 NOTE — TELEPHONE ENCOUNTER
Central PA team  [phone]  Pool: HE PA MED ([id number])          PA has been initiated.       PA form completed and faxed insurance via Cover My Meds     Key:   [id number] - PA Case ID: [id number] - Rx #: [id number]     Medication:  Temazepam 15MG capsules      Insurance:  [employer]         Response will be received via fax and may take up to 5-10 business days depending on plan

## 2021-06-07 NOTE — TELEPHONE ENCOUNTER
Controlled Substance Refill Request  Medication Name:   Requested Prescriptions     Pending Prescriptions Disp Refills     temazepam (RESTORIL) 15 mg capsule [Pharmacy Med Name: TEMAZEPAM 15MG CAPSULES] 30 capsule 0     Sig: TAKE 1 CAPSULE(15 MG) BY MOUTH AT BEDTIME AS NEEDED FOR SLEEP     Date Last Fill: 12/16/19  [facility]  Submit electronically to pharmacy  Controlled Substance Agreement on file:   Encounter-Level CSA Scan Date:    There are no encounter-level csa scan date.        Last office visit:  1/10/20

## 2021-06-07 NOTE — TELEPHONE ENCOUNTER
Prior Authorization Request  Who s requesting:  Pharmacy  Pharmacy Name and Location: [facility]  Medication Name: temazepam (RESTORIL) 15 mg capsule   Insurance Plan: n/a  Insurance Member ID Number:  n/a  CoverMyMeds Key: [id number]  Informed patient that prior authorizations can take up to 10 business days for response:   NA  Okay to leave a detailed message: No

## 2021-06-08 NOTE — PROGRESS NOTES
[facility] Radiation Oncology Follow Up Note    Patient: Emili Man  MRN: [id number]  Date of Service: 01/16/2017    Assessment / Impression     1. Meningioma (right para clinoid)            Plan:   F/u 6 months with scan     Face to face time  40 minutes with > 75% spent on consultation, education and coordination of care     Subjective:      HPI: Emili Man is a 76 y.o. female with     Emili Man was treated with Cyberknife stereotactic radiosurgery for a meningioma located in right prerclinoid region 2.5x1.8x2.2 cm adjacent to chiasm, right optic nerve, encasing regional arteries and invasive locally precluding surgical intervention.    SITE TREATED: right para clinoid region    TOTAL DOSE: 2500    NUMBER OF FRACTION: 5    DATES COMPLETED: 7/8/2015    Emili tolerated the treatment without unexpected sides effects.   No visual sx. Had left cataract removed with trifocal replacement. Had the other eye done and is very happy with result. No visual changes.  Scan 1/12/2017 shows stable to slightly smaller. Vessels patient.     The following portions of the patient's history were reviewed and updated as appropriate: allergies, current medications, past family history, past medical history, past social history, past surgical history and problem list.    Current Outpatient Prescriptions   Medication Sig Dispense Refill     pravastatin (PRAVACHOL) 20 MG tablet Take 1 tablet (20 mg total) by mouth daily. 90 tablet 3     temazepam (RESTORIL) 30 mg capsule Take 1 capsule (30 mg total) by mouth bedtime as needed for sleep. Takes rarely per patient 30 capsule 0     No current facility-administered medications for this visit.        REVIEW OF SYSTEMS     General: No Data Recorded  Comfort: No Data Recorded  CNS Alteration: No Data Recorded  Sensory Alteration: No Data Recorded  Nutrition: No Data Recorded  Skin: No Data Recorded  Mucous Membranes: No Data Recorded  Emotional: No Data Recorded  Vital Signs: No Data  "Recorded    Objective:     Physical Exam    Recent Vitals 11/30/2016   Height 5' 1.5\"   Weight 140 lbs 2 oz   BSA (m2) 1.66 m2   /82   Pulse 78   Temp -   SpO2 98        General appearance: alert, appears stated age and cooperative  Head: Normocephalic, without obvious abnormality, atraumatic  Eyes: conjunctivae/corneas clear. PERRL, EOM's intact. Fundi benign.  Nose: Nares normal. Septum midline. Mucosa normal. No drainage or sinus tenderness.  Neck: no adenopathy and supple, symmetrical, trachea midline  Lungs: clear to auscultation bilaterally  Heart: regular rate and rhythm, S1, S2 normal, no murmur, click, rub or gallop and regular rate and rhythm  Skin: Skin color, texture, turgor normal. No rashes or lesions  Lymph nodes: Cervical, supraclavicular, and axillary nodes normal.  Neurologic: face symmetric, moves all 4 extremities, CN II-XII.     Recent Labs:   Recent Results (from the past 168 hour(s))   POCT creatinine   Result Value Ref Range    POC Creatinine 0.7 mg/dL   POCT GFR   Result Value Ref Range    POC GFR AMER AF HE >60  >60 mL/min/1.73m2    POC GFR NON AMER AF >60  >60 mL/min/1.73m2       Imaging: Imaging results 30 days: Mr Head With Without Contrast    Result Date: 1/13/2017  [facility] HEAD MRI WITHOUT AND WITH IV CONTRAST 1/12/2017 10:58 AM INDICATION: Neoplasm: head, CNS, rx monitor or f/u. TECHNIQUE: Head MRI without and with intravenous contrast. CONTRAST: 6 mL Magnevist. COMPARISON:  Multiple prior examinations, the most recent of which is from 07/08/2016. FINDINGS: No acute infarct/restricted diffusion. Mild cerebral and cerebellar volume loss. Small chronic lacunar infarct within the left cerebellum is unchanged.  Right paraclinoid extra-axial mass lesion presumably related to a meningioma is unchanged in size compared to the most recent study given slight differences in slice selection and when measured in a similar fashion it measures 27 x 18 mm oblique ML x AP x 24 mm " craniocaudad. Again it may have subtly decreased in size compared to the 01/07/2016 study. There is likely minimal extension into Meckel's cave which is unchanged. There is medial displacement of the prechiasmatic optic nerve and the optic chiasm which is also unchanged. There is encasement of the right supra clinoid ICA and M1. Flow voids remain patent. Trace mucosal thickening ethmoid air cells.     CONCLUSION: 1.  Presumed right paraclinoid meningioma is stable in size compared to the most recent study though slightly diminished compared 01/07/2016. Persistent displacement of the right optic nerve optic chiasm and encasement of the right supraclinoid ICA and M1 segment. The flow voids remain patent. 2.  No new lesion. 3.  Mild volume loss and small chronic lacunar infarct within the left cerebellum unchanged.       Pathology:   No results found for this or any previous visit (from the past 2160 hour(s)).    Signed by: Damaris Hoang MD

## 2021-06-10 NOTE — TELEPHONE ENCOUNTER
Pt called at this time for MRI results. MRI was 8/4 and recall f/u apt with EC was missed. Note sent to IC's as an FYI. I read the MRI Impression to pt and she was happy to hear her meningioma is stable. She continues to have episodes of dizziness as she did last year and I told her that last year Dr. Hoang didn't think it was d/t the tumor. I told her to f/u with PCP. She continues to follow with GI for dilated esophagus. No other new concerns or problems. I told her Dr. Hoang may not need to speak with her since I did already, but I'd have Dr. Cameron call her if she had additional questions.     Dr. Hoang, please enter an MRI order for next year if appropriate.

## 2021-06-11 NOTE — PROGRESS NOTES
OFFICE VISIT NOTE  Emili Man   76 y.o. female            Assessment/Plan for  Emili Man is a 76 y.o. female.  No Patient Care Coordination Note on file.         There are no diagnoses linked to this encounter.   1.  Vertigo probable benign positional however the tumor does press on her circulation.  Her neurologic exam today is unremarkable.  There is no incoordination at all.  No nystagmus.  Will give aspirin 81 mg daily, meclizine as needed, follow-up as needed.  We will see scheduled in November.  2.  Meningioma with compression of optic nerves/arterial system-unchanged  3.  Hyperlipidemia-on treatment     Plan:  Aspirin 81  Meclizine as needed  If worsens consider neurologic consultation  Consider empiric Epley maneuver  There are no Patient Instructions on file for this visit.      There are no diagnoses linked to this encounter.      This provider spent greater than 25 min. face-to-face time with the patient and/or his family.  More than half this time was spent in counseling and or coordination of care with other providers or agencies which were consistent with the nature of this patient's problems which are listed and described in the assessment and plan.      Conner Codrero MD  Internal medicine  [facility]  [phone]  [email or web address]      This is an electronically verified report by Conner Cordero M.D.  (Note created with Dragon voice recognition and unintended spelling errors and word substitutions may occur)               Subjective:   Chief Complaint:  Follow-up    Episode in vertigo.  Acute onset.  Went to ER  Patient was incapacitated-had a crawl to the door to have her son open it  It resolved in about 8-12 hours  ER visit with MRI MRA showed no change    No evidence of stroke    Had about 2 years ago    No headache nystagmus diplopia incoordination  She walks regularly and briskly    She did have an episode like this 2 years ago.    She has seen  ENT in the past                Review of Systems:     Extensive 10-point review of systems was performed. Please see the HPI for problem specific pertinent review of systems.     Patient does note she lives alone.  She has good family support she remains very active    Otherwise, the following systems are noncontributory including constitutional, eyes, ears, nose and throat, cardiovascular, respiratory, gastrointestinal, genitourinary, musculoskeletal,neurological, skin and/or breast, endocrine, hematologic/lymph, allergic/immunologic and psychiatric.              Medications:  Current Outpatient Prescriptions   Medication Sig Dispense Refill     cholecalciferol, vitamin D3, (VITAMIN D3) 1,000 unit capsule Take 1,000 Units by mouth daily.       meclizine (ANTIVERT) 25 mg tablet Take 25 mg by mouth daily as needed.       pravastatin (PRAVACHOL) 20 MG tablet Take 1 tablet (20 mg total) by mouth daily. 90 tablet 3     temazepam (RESTORIL) 30 mg capsule TAKE ONE CAPSULE BY MOUTH AT BEDTIME AS NEEDED FOR SLEEP 30 capsule 0     No current facility-administered medications for this visit.      Current Outpatient Prescriptions on File Prior to Visit   Medication Sig     cholecalciferol, vitamin D3, (VITAMIN D3) 1,000 unit capsule Take 1,000 Units by mouth daily.     pravastatin (PRAVACHOL) 20 MG tablet Take 1 tablet (20 mg total) by mouth daily.     temazepam (RESTORIL) 30 mg capsule TAKE ONE CAPSULE BY MOUTH AT BEDTIME AS NEEDED FOR SLEEP     No current facility-administered medications on file prior to visit.        Allergies:No Known Allergies    PSFHx: Tobacco Status:  She  reports that she has never smoked. She has never used smokeless tobacco.   Alcohol Status:    History   Alcohol Use     Yes     Comment: socially       reports that she has never smoked. She has never used smokeless tobacco. She reports that she drinks alcohol. She reports that she does not use illicit drugs.    Objective:    /80 (Patient Site:  "Right Arm, Patient Position: Sitting, Cuff Size: Adult Regular)  Pulse 63  Ht 5' 2\" (1.575 m)  Wt 142 lb 1.3 oz (64.4 kg)  LMP 11/30/1991 (Approximate)  SpO2 98%  Breastfeeding? No  BMI 25.99 kg/m2  Weight:   Wt Readings from Last 3 Encounters:   06/05/17 142 lb 1.3 oz (64.4 kg)   05/23/17 135 lb (61.2 kg)   01/16/17 141 lb 12.8 oz (64.3 kg)     BP Readings from Last 3 Encounters:   06/05/17 146/80   05/23/17 136/74   01/16/17 119/73         General-appears well, no acute distress.  Speech clear  Face symmetric  No nystagmus  No field cut  Balance is excellent particularly for her age    Reviewed MRI report      Review of clinical lab tests  Lab Results   Component Value Date    WBC 4.9 05/23/2017    HGB 13.8 05/23/2017    HCT 42.2 05/23/2017     05/23/2017    CHOL 217 (H) 11/30/2016    TRIG 144 11/30/2016    HDL 45 (L) 11/30/2016    ALT 17 11/30/2016    AST 23 11/30/2016     05/23/2017    K 3.9 05/23/2017     05/23/2017    CREATININE 0.85 05/23/2017    BUN 25 05/23/2017    CO2 25 05/23/2017    TSH 1.48 11/30/2016    INR 0.94 05/23/2017       Glucose   Date/Time Value Ref Range Status   05/23/2017 04:53  (H) 70 - 125 mg/dL Final   11/30/2016 12:15 PM 93 70 - 125 mg/dL Final     No results found for this or any previous visit (from the past 24 hour(s)).    RADIOLOGY: Mr Brain Cow Carotid With And Without Contrast    Result Date: 5/23/2017  [facility] 1. HEAD MRI WITHOUT AND WITH IV CONTRAST 2. HEAD MRA WITHOUT IV CONTRAST 3. NECK MRA WITHOUT AND WITH IV CONTRAST 5/23/2017 10:51 AM INDICATION: Vertigo and balance problems. TECHNIQUE: 1. Head MRI without and with intravenous contrast. 2. 3D time-of-flight head MRA without intravenous contrast. 3. Neck MRA without and with IV contrast. CONTRAST: 7.5 mL Gadavist COMPARISON: Brain MRI 1/12/2017 FINDINGS: HEAD MRI: Diffusion-weighted images demonstrate no areas of restricted diffusion. No subacute or chronic intracranial " hemorrhage. Cerebral parenchymal volume is within normal limits for patient's age. No midline shift. The basilar cisterns are patent. There are a couple of punctate scattered foci of subcortical white matter T2 prolongation, not greater than expected for patient's age. Redemonstration of the known enhancing right paraclinoid extra-axial mass with involvement of the right middle cranial  fossa and right cavernous sinus. It measures 15 x 26 x 23 mm (AP times transverse times craniocaudal), stable compared to the previous brain MRI. This mass encases the right supraclinoid internal carotid artery as well as the proximal right middle cerebral artery. The flow voids of those arteries are still patent. This mass abuts and pushes the optic chiasm to the left. No other enhancing intracranial lesions. Chronic infarct in the left cerebellar hemisphere. No abnormal signal within the orbits. Bilateral cataract lens replacements. Mild mucosal thickening of the maxillary sinuses and ethmoid air cells. Minimal opacification of the mastoid tips. Nonpathologic marrow signal in the bones of the calvaria and skull base. Degenerative soft tissue posterior to the dens. HEAD MRA: Dominant left vertebral artery. The intracranial vertebral arteries are patent without hemodynamically significant stenosis. The basilar artery and the bilateral posterior cerebral arteries are patent without hemodynamically significant stenosis. The distal extracranial internal carotid arteries are patent without hemodynamically significant stenosis. There are areas of linear artifact within the petrous internal carotid arteries bilaterally. There is no flow within the A1 segment of the right anterior cerebral artery. However the A2 segment and distally the right anterior cerebral artery is patent. There is narrowing of the supraclinoid right internal carotid artery moderately by the mass. In addition, there is moderate narrowing of  the M1 segment of the right  middle cerebral artery by the mass. However, distal branches of the right middle cerebral artery are patent and demonstrate normal arborization compared to the left. The left middle cerebral artery is patent without hemodynamically significant stenosis. No intracranial aneurysms. NECK MRA: RIGHT CAROTID: No measurable stenosis in the right ICA based on NASCET criteria. LEFT CAROTID: No measurable stenosis in the left ICA based on NASCET criteria. VERTEBRAL ARTERIES: Dominant left vertebral artery. Both vertebral arteries are patent throughout their course in the neck.  AORTIC ARCH: Common origin of the brachiocephalic artery and the left common carotid artery. The origins of the arch vessels are patent without hemodynamically significant stenosis.     CONCLUSION: HEAD MRI: 1.  No acute/subacute infarcts. 2.  Redemonstration of the known presumed right paraclinoid meningioma with involvement of the right middle cranial fossa, stable compared to brain MRI 1/12/2017. This mass encases the right supraclinoid internal carotid artery as well as the proximal right middle cerebral artery. The flow voids of the arteries are still patent. This mass abuts and pushes the optic chiasm to the left. 3.  Mild age-related changes. HEAD MRA: 1.  There is moderate narrowing of the supraclinoid right internal carotid artery and the M1 segment of the right middle cerebral artery. In addition, there is no flow-related enhancement within the A1 segment of the right anterior cerebral artery. The A2 segment of the right anterior cerebral artery is supplied by anterior communicating artery. 2.  No high-grade stenosis or occlusion of the rest of the major intracranial arteries. NECK MRA: 1.  No significant stenosis of the internal carotid arteries bilaterally based on NASCET criteria. 2.  The vertebral arteries are patent throughout their course in the neck. No evidence for dissection or pseudoaneurysm.      Review of recent  consultation-MRI/ER note reviewed in entirety

## 2021-06-11 NOTE — PROGRESS NOTES
Pt ambulatory to radiation clinic for follow up. VSS, denies pain. Had an episode of dizziness on 5/23, see ER notes. Had MRI done, wants Dr. Hoang to view, concerned episode had something to do with meningioma. Further recommendations and orders per provider.

## 2021-06-11 NOTE — PROGRESS NOTES
[facility] Radiation Oncology Follow Up Note    Patient: Emili Man  MRN: [id number]  Date of Service: 07/03/2017    Assessment / Impression     1. Meningioma (right para clinoid)         No matching staging information was found for the patient.  ECOG Peformance Status  ECOG Performance Status: 1  Distress Assessment Score  Distress Assessment Score: 2  Body site: Brain    Plan:   Tumor stable and vessels patent.   Unclear source of vertigo.   F/u one year with scan     Face to face time 25  minutes with > 75% spent on consultation, education and coordination of care.    Subjective:      HPI: Emili Man is a 76 y.o. female with meningioma here for routine f/u    Emili Man was treated with Cyberknife stereotactic radiosurgery for a meningioma located in right prerclinoid region 2.5x1.8x2.2 cm adjacent to chiasm, right optic nerve, encasing regional arteries and invasive locally precluding surgical intervention.    SITE TREATED: right para clinoid region    TOTAL DOSE: 2500    NUMBER OF FRACTION: 5    DATES COMPLETED: 7/8/2015    Emili tolerated the treatment without unexpected sides effects.   No visual sx. Had left cataract removed with trifocal replacement. Had the other eye done and is very happy with result. No visual changes.  Scan 1/12/2017 shows stable to slightly smaller. Vessels patent.     Seen in ER recently for vertiginous episode. Normal exam. Scan 5/23/2017 shows tumor stability and vessels remain patent. So no obvious change on scan to explain sx.  None since.     Chief Complaint   Patient presents with     Cyberknife   .    Current Outpatient Prescriptions   Medication Sig Dispense Refill     aspirin 81 MG EC tablet Take 1 tablet (81 mg total) by mouth daily. 150 tablet 2     cholecalciferol, vitamin D3, (VITAMIN D3) 1,000 unit capsule Take 1,000 Units by mouth daily.       meclizine (ANTIVERT) 25 mg tablet Take 25 mg by mouth daily as needed.       pravastatin (PRAVACHOL) 20 MG tablet  Take 1 tablet (20 mg total) by mouth daily. 90 tablet 3     temazepam (RESTORIL) 30 mg capsule TAKE ONE CAPSULE BY MOUTH AT BEDTIME AS NEEDED FOR SLEEP 30 capsule 0     No current facility-administered medications for this visit.        The following portions of the patient's history were reviewed and updated as appropriate: allergies, current medications, past family history, past medical history, past social history, past surgical history and problem list.    Review of Systems    Constitutional  Constitutional (WDL): Exceptions to WDL  Fatigue: Fatigue relieved by rest  Neurosensory  Neurosensory (WDL): All neurosensory elements are within defined limits  Eye        Eye Disorder (WDL): All eye disorder elements are within defined limits  Ear     Cardiovascular  Cardiovascular (WDL): All cardiovascular elements are within defined limits  Pulmonary  Respiratory (WDL): Within Defined Limits  Gastrointestinal  Gastrointestinal (WDL): All gastrointestinal elements are within defined limits  Genitourinary  Genitourinary (WDL): All genitourinary elements are within defined limits              Musculoskeletal              Musculoskeletal and Connetive Tissue Disorders (WDL): Exceptions to WDL  Arthralgia: Mild pain (rachael hands)  Integumentary  Integumentary (WDL): All integumentary elements are within defined limits  Patient Coping  Patient Coping: Accepting  Pain              Currently in Pain: No/denies  Accompanied by  Accompanied by: Alone    Objective:     Physical Exam    Vitals:    07/03/17 1252   BP: 134/73   Pulse: 70   Temp: 97.6  F (36.4  C)   TempSrc: Oral   SpO2: 98%        HEENT normocephalic nontraumatic  Ears - normal external canal, normal tympanic membrane  Eyess; full smooth motion without nystagmus  No positional vertigo.   Neuro:  Nonfocal, CNII-XII intact bilaterally, visual fields full, normal gait, motor strength 5/5 throughout     Recent Labs: No results found for this or any previous visit (from  the past 168 hour(s)).    Imaging: Scan from 5/2017   Signed by: Damaris Hoang MD

## 2021-06-13 NOTE — TELEPHONE ENCOUNTER
Who is calling:  Patient  Reason for Call:  Patient is scheduled for a telephone visit to Establish care with Dr Desir on 11/23/20.   Date of last appointment with primary care:   Okay to leave a detailed message: Yes

## 2021-06-13 NOTE — TELEPHONE ENCOUNTER
Controlled Substance Refill Request  Medication Name:   Requested Prescriptions     Pending Prescriptions Disp Refills     temazepam (RESTORIL) 15 mg capsule [Pharmacy Med Name: TEMAZEPAM 15MG CAPSULES] 30 capsule 0     Sig: TAKE 1 CAPSULE(15 MG) BY MOUTH AT BEDTIME AS NEEDED FOR SLEEP     Date Last Fill: 4/24/20  [facility]ed [facility]acy: [facility]  Submit electronically to pharmacy  Controlled Substance Agreement on file:   Encounter-Level CSA Scan Date:    There are no encounter-level csa scan date.        Last office visit:  1/10/20

## 2021-06-13 NOTE — TELEPHONE ENCOUNTER
No CSA on file.    Hayde SHARPE LPN .......... 10:22 AM  11/12/20  [facility]

## 2021-06-13 NOTE — PROGRESS NOTES
"Emili Man is a 80 y.o. female who is being evaluated via a billable telephone visit.      The patient has been notified of following:     \"This telephone visit will be conducted via a call between you and your physician/provider. We have found that certain health care needs can be provided without the need for a physical exam.  This service lets us provide the care you need with a short phone conversation.  If a prescription is necessary we can send it directly to your pharmacy.  If lab work is needed we can place an order for that and you can then stop by our lab to have the test done at a later time.    Telephone visits are billed at different rates depending on your insurance coverage. During this emergency period, for some insurers they may be billed the same as an in-person visit.  Please reach out to your insurance provider with any questions.    If during the course of the call the physician/provider feels a telephone visit is not appropriate, you will not be charged for this service.\"    Patient has given verbal consent to a Telephone visit? Yes    What phone number would you like to be contacted at? [phone]    Patient would like to receive their AVS by AVS Preference: Mail a copy.    Additional provider notes: delightful lady , completely independent ,  never  , single parent , son 60 .used to be a runner , looked after her father , does free weights . 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 Has wonderful supportive neighbors. Her only issue is sleep and she has been on temazepam for many years. She denies feeling isolated or depressed has no shortness of breath on exertion no incontinence no chronic pain and no change in her bowel habits  Health maintenance :   mammo annual last 2019   dexa not for ten years needs to be done   Immunization flu complete , pertussis complete , needs ? penumococcal booster and shingrix   Colon aged out     Assessment/Plan:  1. Insomnia, unspecified " type  I do not see any ill side effects she has been on the same dose for many years she is alert and has no history of falls or issues with the temazepam will refill.  - temazepam (RESTORIL) 15 mg capsule; TAKE 1 CAPSULE(15 MG) BY MOUTH AT BEDTIME AS NEEDED FOR SLEEP  Dispense: 90 capsule; Refill: 3    2. Health maintenance examination  She is due for labs at the next annual wellness visit I do recommend completing her pneumococcal vaccinations counseling about DEXA scan and Shingrix.    3. Meningioma (right para clinoid)   This has been periodically imaged with an MRI and is currently stable and asymptomatic.        Phone call duration:  12 minutes    JORJE SMILEY

## 2021-06-13 NOTE — TELEPHONE ENCOUNTER
Last OV 1/10/20 for physical/AWV.    No establish of care visit scheduled.    Please advise.    Thank you.    Hayde SHARPE LPN .......... 8:26 AM  11/12/20  [facility]

## 2021-06-14 NOTE — TELEPHONE ENCOUNTER
Reason for Call:  Medication or medication refill:    Do you use a [facility]?  Name of the pharmacy and phone number for the current request: [facility] [phone]    Name of the medication requested: pravastatin (PRAVACHOL) 20 MG tablet  Patient called her pharmacy this past weekend.    Per Violet they sent numberous request.  Patient is upset.  She is down to her last pill for tomorrow.     Other request: no    Can we leave a detailed message on this number? Yes    Phone number patient can be reached at: Home number on file [phone] (home)    Best Time: anytime    Call taken on 1/28/2021 at 9:30 AM by Abbey Mcgee

## 2021-06-16 PROBLEM — E78.5 HYPERLIPIDEMIA, UNSPECIFIED HYPERLIPIDEMIA TYPE: Status: ACTIVE | Noted: 2019-03-14

## 2021-06-16 PROBLEM — N83.209 CYST OF OVARY, UNSPECIFIED LATERALITY: Status: ACTIVE | Noted: 2019-03-14

## 2021-06-16 PROBLEM — K22.4 ESOPHAGEAL DYSFUNCTION: Status: ACTIVE | Noted: 2019-03-14

## 2021-06-16 PROBLEM — K44.9 HIATAL HERNIA: Status: ACTIVE | Noted: 2019-03-22

## 2021-06-16 PROBLEM — Z00.00 HEALTH MAINTENANCE EXAMINATION: Status: ACTIVE | Noted: 2020-11-24

## 2021-06-17 NOTE — TELEPHONE ENCOUNTER
Telephone Encounter by Natividad Orellana at 4/30/2020  8:02 AM     Author: Natividad Orellana Service: -- Author Type: --    Filed: 4/30/2020  8:03 AM Encounter Date: 4/28/2020 Status: Signed    : Natividad Orellana APPROVED:    Approval start date: 03/29/2020  Approval end date:  05/28/2020    Pharmacy has been notified of approval and will contact patient when medication is ready for pickup.

## 2021-06-17 NOTE — PATIENT INSTRUCTIONS - HE
Patient Instructions by Conner Cordero MD at 1/10/2020  1:20 PM     Author: Conner Cordero MD Service: -- Author Type: Physician    Filed: 1/10/2020  2:50 PM Encounter Date: 1/10/2020 Status: Signed    : Conner Cordero MD (Physician)         Patient Education   Alcohol Use   Many people can enjoy a glass of wine or beer without any negative consequences to their health. According to the Centers for Disease Control and Prevention (CDC), having one or fewer drinks per day for women and two or fewer per day for men is considered moderate drinking.     When people drink more than moderately, it can become concerning. Excessive drinking is defined as consuming 15 drinks or more per week for men and 8 drinks or more per week for women. There are various health problems associated with excessive drinking, which include:    Damage to vital organs like the heart, brain, liver and pancreas    Harm to the digestive tract    Weaken the immune system    Higher risk for heart disease and cancer       Patient Education   Understanding [employer] MyPlate  The USDA (US Department of Agriculture) has guidelines to help you make healthy food choices. These are called MyPlate. MyPlate shows the food groups that make up healthy meals using the image of a place setting. Before you eat, think about the healthiest choices for what to put onto your plate or into your cup or bowl. To learn more about building a healthy plate, visit www.choosemyplate.gov.       The Food Groups    Fruits: Any fruit or 100% fruit juice counts as part of the Fruit Group. Fruits may be fresh, canned, frozen, or dried, and may be whole, cut-up, or pureed. Make half your plate fruits and vegetables.    Vegetables: Any vegetable or 100% vegetable juice counts as a member of the Vegetable Group. Vegetables may be fresh, frozen, canned, or dried. They can be served raw or cooked and may be whole, cut-up, or mashed. Make half your plate fruits and  vegetables.     Grains: All foods made from grains are part of the Grains Group. These include wheat, rice, oats, cornmeal, and barley such as bread, pasta, oatmeal, cereal, tortillas, and grits. Grains should be no more than a quarter of your plate. At least half of your grains should be whole grains.    Protein: This group includes meat, poultry, seafood, beans and peas, eggs, processed soy products (like tofu), nuts (including nut butters), and seeds. Make protein choices no more than a quarter of your plate. Meat and poultry choices should be lean or low fat.    Dairy: All fluid milk products and foods made from milk that contain calcium, like yogurt and cheese are part of the Dairy Group. (Foods that have little calcium, such as cream, butter, and cream cheese, are not part of the group.) Most dairy choices should be low-fat or fat-free.    Oils: These are fats that are liquid at room temperature. They include canola, corn, olive, soybean, and sunflower oil. Foods that are mainly oil include mayonnaise, certain salad dressings, and soft margarines. You should have only 5 to 7 teaspoons of oils a day. You probably already get this much from the food you eat.  Use [employer] to Help Build Your Meals  The SuperTracker can help you plan and track your meals and activity. You can look up individual foods to see or compare their nutritional value. You can get guidelines for what and how much you should eat. You can compare your food choices. And you can assess personal physical activities and see ways you can improve. Go to www.choose[place] Bodyplate.gov/supertracker/.    4616-6640 [employer]. [address] [place] [address], [place] [address]. All rights reserved. This information is not intended as a substitute for professional medical care. Always follow your healthcare professional's instructions.             Advance Directive  Patients advance directive was discussed and I am comfortable with the patients  wishes.  Patient Education   Personalized Prevention Plan  You are due for the preventive services outlined below.  Your care team is available to assist you in scheduling these services.  If you have already completed any of these items, please share that information with your care team to update in your medical record.  Health Maintenance   Topic Date Due   ? DXA SCAN  11/14/2004   ? ZOSTER VACCINES (2 of 3) 12/10/2007   ? PNEUMOCOCCAL IMMUNIZATION 65+ LOW/MEDIUM RISK (2 of 2 - PCV13) 12/19/2012   ? MEDICARE ANNUAL WELLNESS VISIT  11/14/2019   ? ADVANCE CARE PLANNING  10/06/2020   ? FALL RISK ASSESSMENT  01/10/2021   ? TD 18+ HE  12/20/2022   ? LIPID  11/14/2023   ? INFLUENZA VACCINE RULE BASED  Completed

## 2021-06-19 NOTE — PROGRESS NOTES
Pt here for routine f/u on her meningioma which was treated with CyberKnife three years ago. Feeling well, no concerns.

## 2021-06-19 NOTE — PROGRESS NOTES
OFFICE VISIT NOTE  Emili Man   77 y.o. female            Assessment/Plan for  Emili Man is a 77 y.o. female.  No Patient Care Coordination Note on file.       1. BPPV (benign paroxysmal positional vertigo)  Significant improvement.  Add prednisone  - methylPREDNISolone (MEDROL) 4 MG tablet; Take 1 tablet (4 mg total) by mouth 2 (two) times a day for 7 days.  Dispense: 14 tablet; Refill: 0    2. Meningioma (right para clinoid)   Had recent MR earlier this month-reviewed.    3.  Left ear ceruminosis-patient is able to pop the ear-Rx earwax drops OTC  Plan:  Decrease meclizine as needed-she is tired on this  Methylprednisone twice daily ×1 week  If still having issues will refer to ENT for Epley maneuver, cerumen removal    There are no Patient Instructions on file for this visit.    Diagnoses and all orders for this visit:    BPPV (benign paroxysmal positional vertigo)  -     methylPREDNISolone (MEDROL) 4 MG tablet; Take 1 tablet (4 mg total) by mouth 2 (two) times a day for 7 days.  Dispense: 14 tablet; Refill: 0    Meningioma (right para clinoid)         Medications after visit  Current Outpatient Prescriptions   Medication Sig Dispense Refill     aspirin (ASPIR-LOW) 81 MG EC tablet Take 1 tablet (81 mg total) by mouth daily. 150 tablet 3     cholecalciferol, vitamin D3, (VITAMIN D3) 1,000 unit capsule Take 1,000 Units by mouth daily.       meclizine (ANTIVERT) 25 mg tablet Take 25 mg by mouth daily as needed.       ondansetron (ZOFRAN ODT) 4 MG disintegrating tablet 1-2 tab dissolve on tongue 4 times daily as needed for nausea, max daily dose 4 tab 12 tablet 0     pravastatin (PRAVACHOL) 20 MG tablet TAKE 1 TABLET(20 MG) BY MOUTH DAILY 90 tablet 1     temazepam (RESTORIL) 15 mg capsule TAKE 1 CAPSULE(15 MG) BY MOUTH AT BEDTIME AS NEEDED FOR SLEEP 30 capsule 0     methylPREDNISolone (MEDROL) 4 MG tablet Take 1 tablet (4 mg total) by mouth 2 (two) times a day for 7 days. 14 tablet 0     No current  facility-administered medications for this visit.                       Conner Cordero MD  Internal medicine  [facility]  [phone]  [email or web address]    Much or all of the text in this note was generated through the use of Dragon Dictate voice-to-text software. Errors in spelling or words which seem out of context are unintentional.   Sound alike errors, in particular, may have escaped editing.                 Subjective:   Chief Complaint:  Hospital Visit Follow Up; Dizziness (Vertigo); and Medication Questions (Continue Vit D)    Was in ER-vertigo  50% decrease  On meclizine and Zofran  50% better    Had routine MR for meningioma surveillance early July.  She saw radiation oncology.  No progression.  Stabilization disease.    She is 50% better.  She is now eating.  She is not throwing up.  She is no longer taking any Zofran    Review of Systems:     Extensive 10-point review of systems was performed. Please see the HPI for problem specific pertinent review of systems.     Patient does note she knows she is tired.    Otherwise, the following systems are noncontributory including constitutional, eyes, ears, nose and throat, cardiovascular, respiratory, gastrointestinal, genitourinary, musculoskeletal,neurological, skin and/or breast, endocrine, hematologic/lymph, allergic/immunologic and psychiatric.              Medications:  Current Outpatient Prescriptions on File Prior to Visit   Medication Sig     aspirin (ASPIR-LOW) 81 MG EC tablet Take 1 tablet (81 mg total) by mouth daily.     cholecalciferol, vitamin D3, (VITAMIN D3) 1,000 unit capsule Take 1,000 Units by mouth daily.     meclizine (ANTIVERT) 25 mg tablet Take 25 mg by mouth daily as needed.     ondansetron (ZOFRAN ODT) 4 MG disintegrating tablet 1-2 tab dissolve on tongue 4 times daily as needed for nausea, max daily dose 4 tab     pravastatin (PRAVACHOL) 20 MG tablet TAKE 1 TABLET(20 MG) BY MOUTH DAILY      "temazepam (RESTORIL) 15 mg capsule TAKE 1 CAPSULE(15 MG) BY MOUTH AT BEDTIME AS NEEDED FOR SLEEP     No current facility-administered medications on file prior to visit.             Allergies:No Known Allergies    PSFHx: Tobacco Status:  She  reports that she has never smoked. She has never used smokeless tobacco.   Alcohol Status:    History   Alcohol Use     Yes     Comment: socially       reports that she has never smoked. She has never used smokeless tobacco. She reports that she drinks alcohol. She reports that she does not use illicit drugs.    Objective:    /80 (Patient Site: Left Arm, Patient Position: Sitting, Cuff Size: Adult Regular)  Pulse 69  Ht 5' 2\" (1.575 m)  Wt 142 lb (64.4 kg)  LMP 11/30/1991 (Approximate)  SpO2 98%  Breastfeeding? No  BMI 25.97 kg/m2  Weight:   Wt Readings from Last 3 Encounters:   07/26/18 142 lb (64.4 kg)   07/21/18 140 lb (63.5 kg)   07/10/18 144 lb 3.2 oz (65.4 kg)     BP Readings from Last 3 Encounters:   07/26/18 132/80   07/21/18 145/78   07/10/18 141/85         General-appears well, no acute distress.  No nystagmus  Face symmetric  Gait normal  No arm drift    No abnormal neurologic findings    Review of clinical lab tests  Lab Results   Component Value Date    WBC 4.9 05/23/2017    HGB 13.8 05/23/2017    HCT 42.2 05/23/2017     05/23/2017    CHOL 217 (H) 11/30/2016    TRIG 144 11/30/2016    HDL 45 (L) 11/30/2016    ALT 17 11/30/2016    AST 23 11/30/2016     07/21/2018    K 3.6 07/21/2018     (H) 07/21/2018    CREATININE 0.83 07/21/2018    BUN 18 07/21/2018    CO2 22 07/21/2018    TSH 1.48 11/30/2016    INR 0.94 05/23/2017       Glucose   Date/Time Value Ref Range Status   07/21/2018 04:53  70 - 125 mg/dL Final   05/23/2017 04:53  (H) 70 - 125 mg/dL Final   11/30/2016 12:15 PM 93 70 - 125 mg/dL Final     No results found for this or any previous visit (from the past 24 hour(s)).    RADIOLOGY: Mr Brain With Without " Contrast    Result Date: 7/2/2018  HEAD MRI WITHOUT AND WITH IV CONTRAST 7/2/2018 11:43 AM INDICATION: Neoplasm: head, cns, rx monitor or f/u meningioma eval response TECHNIQUE: Head MRI without and with intravenous contrast, including dynamic susceptibility contrast enhanced perfusion MRI. CONTRAST: Gadavist 6.5 ml COMPARISON: 05/23/2017, 01/12/2017 and 5/29/2015. FINDINGS: On the diffusion-weighted images there is no evidence of acute ischemia or restricted diffusion. There is no evidence of a midline shift. There is no extra-axial fluid collection or intracranial hemorrhage. Again visualized is the extra-axial mass consistent with a meningioma with intense enhancement. It surrounds the right anterior clinoid process with extension along the greater wing of the right sphenoid bone with involvement of the right middle cranial fossa and the right cavernous sinus.  This continues to encase the right supraclinoid internal carotid artery and the proximal right middle cerebral artery but these flow voids are patent and similar in size in the interval. This mass again abuts the right side of the optic chiasm. This lesion measures approximately 2.7 cm anteriorly posteriorly obliquely by 1.8 cm transversely obliquely by 2.4 cm craniocaudally as compared to similar measurement of 2.8 cm x 1.7 cm x 2.3 cm. This does appear fairly stable in size in the interval. There is no significant surrounding edema in the adjacent brain parenchyma. On the semiquantitative perfusion maps there is no significant increase cerebral blood volume to this meningioma. No other mass lesion or abnormal enhancement visualized. On the FLAIR and T2-weighted images are a few tiny foci of high signal within the periventricular and subcortical white matter consistent with minimal small vessel ischemic disease. There are old lacunar type infarcts again seen involving the left cerebellar hemisphere. The ventricular system, basal cisterns and the cortical  sulci are stable in the interval and consistent with mild volume loss. There is no evidence of cerebellar tonsillar ectopia. The corpus callosum and the sella region have appropriate configuration and signal intensity for the patient's age. Again visualized is the abnormal bone marrow signal involving the C2 vertebral body. This has been seen on the previous several MRIs. 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 There is now though an appearance over the past 2 studies of a chronic fracture through this degenerative cyst. The fracture fragments appear aligned and there is no significant canal compromise but recommend a CT scan of the cervical spine for further evaluation. The orbit regions are unremarkable. The mastoid air cells and the middle ear regions are clear. The sinuses are clear.     CONCLUSION: 1.  Fairly stable right paraclinoid meningioma with involvement of the right middle cranial fossa, the right cavernous sinuses and slight compression upon the right side of the optic chiasm as compared to previous study from 05/23/2017. 2.  This meningioma continues to encase the right supraclinoid internal carotid artery as well as the proximal right middle cerebral artery but flow voids are patent. 3.  No new mass lesion, hemorrhage or focal area of acute ischemia. 4.  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 5.  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 There continues to be abnormal bone marrow signal seen within the C2 vertebral body and there is a possible chronic fracture through the degenerative cyst of the C2 vertebral body which appears fairly similar to the appearance on the previous MRI from 05/23/2017. The fracture fragments do appear fairly well aligned. Recommend  though a CT of the cervical spine for further evaluation. NOTE: ABNORMAL REPORT THE DICTATION ABOVE DESCRIBES AN ABNORMALITY FOR WHICH FOLLOW-UP IS NEEDED..      Review of recent consultation-     Assessment / Impression      1. Meningioma (right para clinoid)          No matching staging information was found for the patient.  ECOG Peformance Status  ECOG Performance Status: 0  Distress Assessment Score  Distress Assessment Score: No distress  Body site: Brain     Plan:   Scan shows tumor to be stable,  however will need follow up for optic chiasm. No change neuro status.     Face to face time  25 minutes with > 75% spent on consultation, education and coordination of care.

## 2021-06-19 NOTE — PROGRESS NOTES
[facility] Radiation Oncology Follow Up Note    Patient: Emili Man  MRN: [id number]  Date of Service: 07/10/2018    Assessment / Impression     1. Meningioma (right para clinoid)         No matching staging information was found for the patient.  ECOG Peformance Status  ECOG Performance Status: 0  Distress Assessment Score  Distress Assessment Score: No distress  Body site: Brain    Plan:   Scan shows tumor to be stable,  however will need follow up for optic chiasm. No change neuro status.     Face to face time  25 minutes with > 75% spent on consultation, education and coordination of care.    Subjective:      HPI: Emili Man is a 77 y.o. female with meningioma here for routine f/u     Emili Man was treated with Cyberknife stereotactic radiosurgery for a meningioma located in right prerclinoid region 2.5x1.8x2.2 cm adjacent to chiasm, right optic nerve, encasing regional arteries and invasive locally precluding surgical intervention.      SITE TREATED: right para clinoid region    TOTAL DOSE: 2500    NUMBER OF FRACTION: 5    DATES COMPLETED: 7/8/2015    Emili tolerated the treatment without unexpected sides effects.   No visual sx. Had left cataract removed with trifocal replacement. Had the other eye done and is very happy with result. No visual changes.  Scan 1/12/2017 shows stable to slightly smaller. Vessels patent.      At our last visit, 7/3/2017, patient was seen in ER recently for vertiginous episode. Normal exam. Scan 5/23/2017 showed tumor stability and vessels remain patent. So no obvious change on scan to explain sx.  None since.     The patient returns for routine follow up. Today she she is doing well. She has no complaints at this time. MR head 7/2/2018 showing stable tumor with patent vessels. No vertiginous episodes.     Current Outpatient Prescriptions   Medication Sig Dispense Refill     aspirin (ASPIR-LOW) 81 MG EC tablet Take 1 tablet (81 mg total) by mouth daily. 150 tablet 3      cholecalciferol, vitamin D3, (VITAMIN D3) 1,000 unit capsule Take 1,000 Units by mouth daily.       meclizine (ANTIVERT) 25 mg tablet Take 25 mg by mouth daily as needed.       pravastatin (PRAVACHOL) 20 MG tablet TAKE 1 TABLET(20 MG) BY MOUTH DAILY 90 tablet 1     temazepam (RESTORIL) 15 mg capsule TAKE 1 CAPSULE(15 MG) BY MOUTH AT BEDTIME AS NEEDED FOR SLEEP 30 capsule 0     No current facility-administered medications for this visit.        The following portions of the patient's history were reviewed and updated as appropriate: allergies, current medications, past family history, past medical history, past social history, past surgical history and problem list.    Review of Systems    General  Constitutional (WDL): All constitutional elements are within defined limits  EENT  Eye Disorder (WDL): All eye disorder elements are within defined limits  Ear Disorder (WDL): All ear disorder elements are within defined limits  Respiratory       Respiratory (WDL): Within Defined Limits  Cardiovascular  Cardiovascular (WDL): All cardiovascular elements are within defined limits  Endocrine     Gastrointestinal  Gastrointestinal (WDL): All gastrointestinal elements are within defined limits  Musculoskeletal  Musculoskeletal and Connetive Tissue Disorders (WDL): All Musculoskeletal and Connetive Tissue Disorder elements are within defined limits  Integumentary               Integumentary (WDL): All integumentary elements are within defined limits  Neurological  Neurosensory (WDL): All neurosensory elements are within defined limits  Psychological/Emotional   Patient Coping: Accepting  Hematological/Lymphatic  Lymph (WDL): All lymph disorder elements are within defined limits  Dermatologic     Genitourinary/Reproductive  Genitourinary (WDL): All genitourinary elements are within defined limits  Reproductive     Pain              Currently in Pain: Yes  Pain Score (Initial OR Reassessment): 1  Pain Frequency:  Intermittent  Location: arthritis in hands  AUA Assessment                                  Accompanied by  Accompanied by: Alone    Objective:     Physical Exam    Vitals:    07/10/18 1336   BP: 141/85   Pulse: 64   Temp: 98.2  F (36.8  C)   TempSrc: Oral   SpO2: 97%   Weight: 144 lb 3.2 oz (65.4 kg)       GENERAL: No acute distress. Cooperative in conversation.   HEENT: Pupils are equal, round and reactive. Oromucosa is clean and intact. No ulcerations or mucositis noted. No bleeding noted.  RESP: Normal respiratory rate.  CV: Regular, rate and rhythm.  ABD: Soft, nontender.  MUSCULOSKELETAL: No palpable points of tenderness.   PSYCH: Within normal limits. No depression or anxiety.  SKIN: Warm dry intact.   LYMPH: No cervical, supraclavicular lymphadenopathy.  EXTREMITIES: No edema .  NEUROLOGIC: CNII-XII symmetric, normal strength, sensation and 2+ reflexes throughout, gait normal, No change vision    Recent Labs: No results found for this or any previous visit (from the past 168 hour(s)).    Imaging: Imaging results 30 days: Mr Brain With Without Contrast    Result Date: 7/2/2018  HEAD MRI WITHOUT AND WITH IV CONTRAST 7/2/2018 11:43 AM INDICATION: Neoplasm: head, cns, rx monitor or f/u meningioma eval response TECHNIQUE: Head MRI without and with intravenous contrast, including dynamic susceptibility contrast enhanced perfusion MRI. CONTRAST: Gadavist 6.5 ml COMPARISON: 05/23/2017, 01/12/2017 and 5/29/2015. FINDINGS: On the diffusion-weighted images there is no evidence of acute ischemia or restricted diffusion. There is no evidence of a midline shift. There is no extra-axial fluid collection or intracranial hemorrhage. Again visualized is the extra-axial mass consistent with a meningioma with intense enhancement. It surrounds the right anterior clinoid process with extension along the greater wing of the right sphenoid bone with involvement of the right middle cranial fossa and the right cavernous sinus.  This  continues to encase the right supraclinoid internal carotid artery and the proximal right middle cerebral artery but these flow voids are patent and similar in size in the interval. This mass again abuts the right side of the optic chiasm. This lesion measures approximately 2.7 cm anteriorly posteriorly obliquely by 1.8 cm transversely obliquely by 2.4 cm craniocaudally as compared to similar measurement of 2.8 cm x 1.7 cm x 2.3 cm. This does appear fairly stable in size in the interval. There is no significant surrounding edema in the adjacent brain parenchyma. On the semiquantitative perfusion maps there is no significant increase cerebral blood volume to this meningioma. No other mass lesion or abnormal enhancement visualized. On the FLAIR and T2-weighted images are a few tiny foci of high signal within the periventricular and subcortical white matter consistent with minimal small vessel ischemic disease. There are old lacunar type infarcts again seen involving the left cerebellar hemisphere. The ventricular system, basal cisterns and the cortical sulci are stable in the interval and consistent with mild volume loss. There is no evidence of cerebellar tonsillar ectopia. The corpus callosum and the sella region have appropriate configuration and signal intensity for the patient's age. Again visualized is the abnormal bone marrow signal involving the C2 vertebral body. This has been seen on the previous several MRIs. 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 There is now though an appearance over the past 2 studies of a chronic fracture through this degenerative cyst. The fracture fragments appear aligned and there is no significant canal compromise but recommend a CT scan of the cervical spine for further evaluation. The orbit regions are unremarkable. The mastoid air cells and the middle ear regions are clear. The  sinuses are clear.     CONCLUSION: 1.  Fairly stable right paraclinoid meningioma with involvement of the right middle cranial fossa, the right cavernous sinuses and slight compression upon the right side of the optic chiasm as compared to previous study from 05/23/2017. 2.  This meningioma continues to encase the right supraclinoid internal carotid artery as well as the proximal right middle cerebral artery but flow voids are patent. 3.  No new mass lesion, hemorrhage or focal area of acute ischemia. 4.  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 5.  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 There continues to be abnormal bone marrow signal seen within the C2 vertebral body and there is a possible chronic fracture through the degenerative cyst of the C2 vertebral body which appears fairly similar to the appearance on the previous MRI from 05/23/2017. The fracture fragments do appear fairly well aligned. Recommend though a CT of the cervical spine for further evaluation. NOTE: ABNORMAL REPORT THE DICTATION ABOVE DESCRIBES AN ABNORMALITY FOR WHICH FOLLOW-UP IS NEEDED..    I, Damaris Hoang MD personally performed the services described in this documentation, as scribed by Ariel Lowe in my presence, and it is both accurate and complete.    Signed by: Damaris Hoang MD, MPH

## 2021-06-20 NOTE — LETTER
Letter by Conner Cordero MD at      Author: Conner Cordero MD Service: -- Author Type: --    Filed:  Encounter Date: 1/12/2020 Status: Signed         Emili Cervantes [address] [place] [address]             January 12, 2020         Dear Ms. Man,    Below are the results from your recent visit:    Resulted Orders   Urinalysis-UC if Indicated   Result Value Ref Range    Color, UA Yellow Colorless, Yellow, Straw, Light Yellow    Clarity, UA Clear Clear    Glucose, UA Negative Negative    Bilirubin, UA Negative Negative    Ketones, UA Negative Negative    Specific Gravity, UA 1.010 1.001 - 1.030    Blood, UA Trace (!) Negative    pH, UA 6.0 4.5 - 8.0    Protein, UA Negative Negative mg/dL    Urobilinogen, UA <2.0 E.U./dL <2.0 E.U./dL, 2.0 E.U./dL    Nitrite, UA Negative Negative    Leukocytes, UA Trace (!) Negative    Bacteria, UA None Seen None Seen hpf    RBC, UA 0-2 None Seen, 0-2 hpf    WBC, UA 0-5 None Seen, 0-5 hpf    Squam Epithel, UA 0-5 None Seen, 0-5 lpf    Mucus, UA Few (!) None Seen lpf    Narrative    Urine Culture ordered based on [facility] criteria   Basic Metabolic Panel   Result Value Ref Range    Sodium 141 136 - 145 mmol/L    Potassium 4.4 3.5 - 5.0 mmol/L    Chloride 107 98 - 107 mmol/L    CO2 26 22 - 31 mmol/L    Anion Gap, Calculation 8 5 - 18 mmol/L    Glucose 92 70 - 125 mg/dL    Calcium 9.5 8.5 - 10.5 mg/dL    BUN 15 8 - 28 mg/dL    Creatinine 0.80 0.60 - 1.10 mg/dL    GFR MDRD Af Amer >60 >60 mL/min/1.73m2    GFR MDRD Non Af Amer >60 >60 mL/min/1.73m2    Narrative    Fasting Glucose reference range is 70-99 mg/dL per  American Diabetes Association (ADA) guidelines.   Hepatic Profile   Result Value Ref Range    Bilirubin, Total 0.6 0.0 - 1.0 mg/dL    Bilirubin, Direct 0.2 <=0.5 mg/dL    Protein, Total 7.2 6.0 - 8.0 g/dL    Albumin 4.0 3.5 - 5.0 g/dL    Alkaline Phosphatase 77 45 - 120 U/L    AST 20 0 - 40 U/L    ALT 12 0 - 45 U/L   Lipid Cascade   Result Value Ref  Range    Cholesterol 193 <=199 mg/dL    Triglycerides 131 <=149 mg/dL    HDL Cholesterol 45 (L) >=50 mg/dL    LDL Calculated 122 <=129 mg/dL    Patient Fasting > 8hrs? Yes    Thyroid Cascade   Result Value Ref Range    TSH 1.46 0.30 - 5.00 uIU/mL   Erythrocyte Sedimentation Rate   Result Value Ref Range    Sed Rate 11 0 - 20 mm/hr   HM1 (CBC with Diff)   Result Value Ref Range    WBC 3.9 (L) 4.0 - 11.0 thou/uL    RBC 4.01 3.80 - 5.40 mill/uL    Hemoglobin 13.6 12.0 - 16.0 g/dL    Hematocrit 39.4 35.0 - 47.0 %    MCV 98 80 - 100 fL    MCH 34.0 27.0 - 34.0 pg    MCHC 34.6 32.0 - 36.0 g/dL    RDW 12.2 11.0 - 14.5 %    Platelets 142 140 - 440 thou/uL    MPV 7.9 7.0 - 10.0 fL    Neutrophils % 50 50 - 70 %    Lymphocytes % 36 20 - 40 %    Monocytes % 11 (H) 2 - 10 %    Eosinophils % 2 0 - 6 %    Basophils % 0 0 - 2 %    Neutrophils Absolute 2.0 2.0 - 7.7 thou/uL    Lymphocytes Absolute 1.4 0.8 - 4.4 thou/uL    Monocytes Absolute 0.4 0.0 - 0.9 thou/uL    Eosinophils Absolute 0.1 0.0 - 0.4 thou/uL    Basophils Absolute 0.0 0.0 - 0.2 thou/uL   Glycosylated Hemoglobin A1c   Result Value Ref Range    Hemoglobin A1c 5.8 3.5 - 6.0 %   Culture, Urine   Result Value Ref Range    Culture No Growth        I enjoyed seeing you in the office at your appointment.  I am pleased with the results of your lab tests.    All is well.  You are a wonderful person and I consider myself privileged and fortunate to have been your physician all these years.  Wishing you a healthy future.      Please call with questions or contact us using [employer].    Sincerely,        Electronically signed by Conner Cordero MD

## 2021-06-21 NOTE — PROGRESS NOTES
Assessment and Plan:        1. Encounter for Medicare annual wellness exam  Completed    2. Meningioma (right para clinoid)   Stable per recent MRI asymptomatic    3. Sequela of lacunar infarction  Cerebellar.  On aspirin.    4. Vertigo  PRN meclizine    5.  Dysphasia with abnormal esophagram clinical GERD.  She is responding to PPI.  She needs an upper GI endoscopy for.  I reviewed her barium swallow.  She has been contacted by Dr. Milla Lopez  at [facility]  Consider Plavix rather than aspirin.  Patient will address that with.  [facility] physician  The patient's current medical problems were reviewed.    6.  Situational anxiety-Sister Shama younger sister who she helped raise has pancreatic cancer            In addition to the wellness examination additional time was spent addressing the following listed problems.   Above    In doing so, this provider spent greater than 5 min. face-to-face time with the patient and/or his family.  More than half this time was spent in counseling and or coordination of care with other providers or agencies which were consistent with the nature of this patient's problems which are listed and described in the assessment and plan.          Conner Cordero MD  Internal medicine  [facility]  [phone]  [email or web address]      The following health maintenance schedule was reviewed with the patient and provided in printed form in the after visit summary:   Health Maintenance   Topic Date Due     DXA SCAN  11/14/2004     PNEUMOCOCCAL CONJUGATE VACCINE FOR ADULTS (PCV13 OR PREVNAR)  11/14/2004     ZOSTER VACCINES (2 of 3) 12/10/2007     FALL RISK ASSESSMENT  11/14/2019     ADVANCE DIRECTIVES DISCUSSED WITH PATIENT  10/06/2020     TD 18+ HE  12/20/2022     PNEUMOCOCCAL POLYSACCHARIDE VACCINE AGE 65 AND OVER  Completed     INFLUENZA VACCINE RULE BASED  Completed        Subjective:   Chief Complaint: Emili Man is an 78 y.o.  female here for an Annual Wellness visit.   HPI: Here for wellness    Patient has had some GERD  She is on aspirin  She has had some dysphasia  She had a barium swallow-reviewed below  She has been contacted by [facility] regarding endoscopy.  Patient is on chronic aspirin therapy to prevent lacunar infarctions which have been seen on her MRI    Meningioma stable clinically stable by MRI    Sister Shama-recent diagnosis pancreatic cancer with metastasis to liver    Review of Systems:      Appetite is good  Bowels are regular  Patient does have some right inguinal pain  She had an ultrasound which showed some benign       please see above.  The rest of the review of systems are negative for all systems.    Patient Care Team:  Conner Cordero MD as PCP - General (Internal Medicine)     Patient Active Problem List   Diagnosis     Meningioma (right para clinoid)      Past Medical History:   Diagnosis Date     High cholesterol      Meningioma (H)      Osteoarthritis      Positive anti-CCP test      Vertigo       Past Surgical History:   Procedure Laterality Date     CATARACT EXTRACTION, BILATERAL Bilateral 2016     DILATION AND CURETTAGE OF UTERUS  6/3/03     hemrroidectomy       HERNIA REPAIR       HERNIA REPAIR  6/25/99 & 8/28/07     TUBAL LIGATION  8/31/76      Family History   Problem Relation Age of Onset     Diabetes Mother      Diabetes Father      Prostate cancer Father      No Medical Problems Sister      Heart disease Brother      No Medical Problems Daughter      No Medical Problems Son      No Medical Problems Maternal Aunt      No Medical Problems Maternal Uncle      No Medical Problems Paternal Aunt      No Medical Problems Paternal Uncle      No Medical Problems Maternal Grandmother      No Medical Problems Maternal Grandfather      No Medical Problems Paternal Grandmother      No Medical Problems Paternal Grandfather       Social History     Socioeconomic History     Marital status: Single      "Spouse name: Not on file     Number of children: Not on file     Years of education: Not on file     Highest education level: Not on file   Social Needs     Financial resource strain: Not on file     Food insecurity - worry: Not on file     Food insecurity - inability: Not on file     Transportation needs - medical: Not on file     Transportation needs - non-medical: Not on file   Occupational History     Not on file   Tobacco Use     Smoking status: Never Smoker     Smokeless tobacco: Never Used   Substance and Sexual Activity     Alcohol use: Yes     Comment: socially     Drug use: No     Sexual activity: Not on file   Other Topics Concern     Not on file   Social History Narrative    Retired [employer] 1995    Active woman    Son-Noah Keane-1962    Grandson-Sid    Granddaughter-Samantha      Current Outpatient Medications   Medication Sig Dispense Refill     aspirin (ASPIR-LOW) 81 MG EC tablet Take 1 tablet (81 mg total) by mouth daily. 150 tablet 3     cholecalciferol, vitamin D3, (VITAMIN D3) 1,000 unit capsule Take 1,000 Units by mouth daily.       omeprazole (PRILOSEC) 40 MG capsule TK 1 C PO QD B EDER  5     pravastatin (PRAVACHOL) 20 MG tablet Take 1 tablet (20 mg total) by mouth daily. 90 tablet 3     temazepam (RESTORIL) 15 mg capsule TAKE 1 CAPSULE(15 MG) BY MOUTH AT BEDTIME AS NEEDED FOR SLEEP 30 capsule 0     No current facility-administered medications for this visit.       Objective:   Vital Signs:   Visit Vitals  /80 (Patient Site: Left Arm, Patient Position: Sitting, Cuff Size: Adult Regular)   Pulse 64   Resp 16   Ht 5' 1.5\" (1.562 m)   Wt 140 lb 0.6 oz (63.5 kg)   LMP 11/30/1991 (Approximate)   SpO2 97%   Breastfeeding? No   BMI 26.03 kg/m         VisionScreening:  No exam data present     PHYSICAL EXAM  Skin: Normal. No rash or lesion  Lymph Nodes: None palpable-including neck, axilla, inguinal, epitrochlear.  Head:  Normocephalic.    Eyes: Midline.  Equal size., full ROM.  " External exams normal.  No icterus  Ears:  Normal pinnae, canals, and TM's.    Nose:  Patent, without deformity.    Throat:  Moist mucous membranes without lesions, erythema, or exudate.    Neck: No palpable masses, lymphadenopathy or tenderness.No thyromegaly or goiter.  No thyroid nodule.  Carotid Arteries:  No Bruit.  Carotid upstroke normal  Chest Wall: No deformity or pain elicited on compression.  Respiratory:  Normal respiratory effort.  Lungs are clear with good breath sounds.  No dullness.  No wheezing.  Heart: Regular rhythm.  Normal sounding S1, S2 without S3, S4, murmurs, rubs, or gallops.    Abdomen:  The abdomen was flat, soft and nontender without guarding rebound or masses.  There are normal bowel sounds.  There is no hepatosplenomegaly.  There is no palpable enlargement of the aorta.  Inguinal area-no femoral hernia.  She is tender in the femoral artery area.  The artery itself is normal she has good distal pulses.  Provocative move do not elicit a hernia  Extremities:  Full ROM without limitation, deformity or edema.    4+ pulses  Neurologic-intact- No focal deficit.  Speech clear.  Coordination normal.  Strength symmetric  Orthopedic-no arthropathy.          Pelvic/breast exam per GYN Dr. Rose            Care everywhere reviewed                IMPRESSION:   CONCLUSION:  1.  Fairly stable right paraclinoid meningioma with involvement of the right middle cranial fossa, the right cavernous sinuses and slight compression upon the right side of the optic chiasm as compared to previous study from 05/23/2017.   2.  This meningioma continues to encase the right supraclinoid internal carotid artery as well as the proximal right middle cerebral artery but flow voids are patent.  3.  No new mass lesion, hemorrhage or focal area of acute ischemia.  4.  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  5.  Patient has had a known degenerative cyst involving the C2  vertebral body and evaluated on an MRI of the cervical spine from 05/29/2015. There continues to be abnormal bone marrow signal seen within the C2 vertebral body and there is a possible   chronic fracture through the degenerative cyst of the C2 vertebral body which appears fairly similar to the appearance on the previous MRI from 05/23/2017. The fracture fragments do appear fairly well aligned. Recommend though a CT of the cervical spine   for further evaluation.     NOTE: ABNORMAL REPORT         Emili Man was treated with Cyberknife stereotactic radiosurgery for a meningioma located in right prerclinoid region 2.5x1.8x2.2 cm adjacent to chiasm, right optic nerve, encasing regional arteries and invasive locally precluding surgical intervention.       SITE TREATED: right para clinoid region    TOTAL DOSE: 2500    NUMBER OF FRACTION: 5    DATES COMPLETED: 7/8/2015    Emili tolerated the treatment without unexpected sides effects.   No visual sx. Had left cataract removed with trifocal replacement. Had the other eye done and is very happy with result. No visual changes.  Scan 1/12/2017 shows stable to slightly smaller. Vessels patent.      Date: 10/22/2018 Department: [facility] Imaging Released By: Joanne Langenbrunner Authorizing: Saima Liu PA-C   Study Result     XR ESOPHAGRAM  10/22/2018 9:13 AM     INDICATION: Dysphagia, unspecified, difficulties with solids (bread) getting stuck     TECHNIQUE: Routine.  COMPARISON: None.     FINDINGS:  FLUOROSCOPIC TIME: 2.8 minutes  NUMBER OF IMAGES: 13     ESOPHAGUS: There is a moderate size sliding esophageal hiatal hernia. No definite reflux in the recumbent position. Very mild smooth narrowing of the distal esophagus at the EG junction most suggestive of mild chronic reflux esophagitis. This likely   explains the patient's symptoms. Esophagus otherwise normal.     IMPRESSION:   CONCLUSION:  1.  Moderate size sliding esophageal hiatal  hernia.     2.  Mild smooth narrowing of the distal esophagus at the EG junction likely from mild chronic reflux disease.     3.  Upper endoscopy may be helpful to further evaluate the distal esophagus given the patient's symptoms.         Assessment Results 11/14/2018   Activities of Daily Living No help needed   Instrumental Activities of Daily Living No help needed   Get Up and Go Score Less than 12 seconds   Mini Cog Total Score 4   Some recent data might be hidden     A Mini-Cog score of 0-2 suggests the possibility of dementia, score of 3-5 suggests no dementia    Identified Health Risks:     The patient was counseled and encouraged to consider modifying their diet and eating habits. She was provided with information on recommended healthy diet options.  Information regarding advance directives (living [place]), including where she can download the appropriate form, was provided to the patient via the AVS.        Satisfactory

## 2021-06-23 NOTE — TELEPHONE ENCOUNTER
Referral Request  Type of referral: Esophageal motility study  Who s requesting: Patient  Why the request: hiatal hernia  Have you been seen for this request: Yes  Does patient have a preference on a group/provider? [facility]  Okay to leave a detailed message?  Yes    Patient was going to [facility] for this but was referred to their [facility] for a Esophageal motility study, patient does not want to go to [facility] and is requesting to have this done at [facility].

## 2021-06-23 NOTE — TELEPHONE ENCOUNTER
Please check and see this has been done. Sorry sent to me directly on Friday when I was not in the office  Larissa Lewis CSS

## 2021-06-23 NOTE — TELEPHONE ENCOUNTER
Patient wants a referral to [facility] clinic at [facility]. She would like to transfer care to them and get a second opinion on what is going on with her hernia and to see if this test is even necessary. Order placed for co-sign.  Larissa Lewis CSS

## 2021-06-24 NOTE — PROGRESS NOTES
OFFICE VISIT NOTE  Emili Man   78 y.o. female            Assessment/Plan for  Emili Man is a 78 y.o. female.  No Patient Care Coordination Note on file.       1. Esophageal dysfunction  With transit defect noted mid esophagus on esophageal function testing 2/2019  Has follow-up with GI clinic  Smaller portion size, smaller volume liquids, liquid, eat slower.    2. Chronic gastritis without bleeding, unspecified gastritis type  On Nexium OTC daily times 1 month    3. Hyperlipidemia, unspecified hyperlipidemia type  On Rx    4. Grief  Sister's death-discussion  Patient does have chronic anxiety issues.  Not interested in medication  5.  Ovarian cyst-reassured follow-up ultrasound one year          Plan:  Nexium daily  Follow-up GI  Dietary instructions  Clinic visit 3 months    There are no Patient Instructions on file for this visit.    Diagnoses and all orders for this visit:    Esophageal dysfunction    Chronic gastritis without bleeding, unspecified gastritis type    Hyperlipidemia, unspecified hyperlipidemia type    Grief    Cyst of ovary, unspecified laterality        Medications after visit  Current Outpatient Medications   Medication Sig Dispense Refill     esomeprazole (NEXIUM) 20 MG capsule Take 20 mg by mouth daily before breakfast.       pravastatin (PRAVACHOL) 20 MG tablet Take 1 tablet (20 mg total) by mouth daily. 90 tablet 3     temazepam (RESTORIL) 15 mg capsule TAKE 1 CAPSULE(15 MG) BY MOUTH AT BEDTIME AS NEEDED FOR SLEEP 30 capsule 0     No current facility-administered medications for this visit.                 This provider spent greater than 5 min. face-to-face time with the patient and/or his family.  More than half this time was spent in counseling and or coordination of care which was consistent with the nature of this patient's problems which are listed and described in the assessment and plan.      Conner Cordero MD  Internal medicine  [facility]  [facility]  [phone]  [email or web address]    Much or all of the text in this note was generated through the use of Dragon Dictate voice-to-text software. Errors in spelling or words which seem out of context are unintentional.   Sound alike errors, in particular, may have escaped editing.                 Subjective:   Chief Complaint:  Hernia (Trouble swallowing. Frustrated with normal reports); Test Results (Concerned about cysts/ told at last visit to follow up in 3 months per MD); Dizziness (Another episode); and Follow-up (Routine 3m visit)    The patient was seen for endoscopy and esophageal function studies recently.  Some gastritis noted.  Positive hiatal hernia.  She had abnormal bolus transit of a moderate nature    She is taking a PPI OTC Prilosec which is been ineffective.  She does not have melena no hematochezia    She is grieving.  She also seen her younger sister by 5 years recently  from pancreatic cancer at age 74.        Review of Systems:     Extensive 10-point review of systems was performed. Please see the HPI for problem specific pertinent review of systems.     Patient does note good appetite  No melena no hematochezia    Otherwise, the following systems are noncontributory including constitutional, eyes, ears, nose and throat, cardiovascular, respiratory, gastrointestinal, genitourinary, musculoskeletal,neurological, skin and/or breast, endocrine, hematologic/lymph, allergic/immunologic and psychiatric.              Medications:  Current Outpatient Medications on File Prior to Visit   Medication Sig     esomeprazole (NEXIUM) 20 MG capsule Take 20 mg by mouth daily before breakfast.     pravastatin (PRAVACHOL) 20 MG tablet Take 1 tablet (20 mg total) by mouth daily.     temazepam (RESTORIL) 15 mg capsule TAKE 1 CAPSULE(15 MG) BY MOUTH AT BEDTIME AS NEEDED FOR SLEEP     [DISCONTINUED] aspirin (ASPIR-LOW) 81 MG EC tablet Take 1 tablet (81 mg total) by mouth daily.     [DISCONTINUED]  "cholecalciferol, vitamin D3, (VITAMIN D3) 1,000 unit capsule Take 1,000 Units by mouth daily.     [DISCONTINUED] omeprazole (PRILOSEC) 40 MG capsule TK 1 C PO QD B EEDR     No current facility-administered medications on file prior to visit.             Allergies:No Known Allergies    PSFHx: Tobacco Status:  She  reports that  has never smoked. she has never used smokeless tobacco.   Alcohol Status:    Social History     Substance and Sexual Activity   Alcohol Use Yes    Comment: socially       reports that  has never smoked. she has never used smokeless tobacco. She reports that she drinks alcohol. She reports that she does not use drugs.    Objective:    /62 (Patient Site: Left Arm, Patient Position: Sitting, Cuff Size: Adult Regular)   Pulse 71   Ht 5' 1.5\" (1.562 m)   Wt 141 lb 1.9 oz (64 kg)   LMP 11/30/1991 (Approximate)   SpO2 98%   Breastfeeding? No   BMI 26.23 kg/m    Weight:   Wt Readings from Last 3 Encounters:   03/14/19 141 lb 1.9 oz (64 kg)   11/14/18 140 lb 0.6 oz (63.5 kg)   07/26/18 142 lb (64.4 kg)     BP Readings from Last 10 Encounters:   03/14/19 116/62   11/14/18 134/80   07/26/18 132/80   07/21/18 145/78   07/10/18 141/85   07/03/17 134/73   06/05/17 146/80   05/23/17 136/74   01/16/17 119/73   11/30/16 122/82         General-appears well, no acute distress.  Appears well  Good color  No rash  Lungs clear  No murmur  Benign abdomen  No edema      Review of clinical lab tests  Lab Results   Component Value Date    WBC 3.4 (L) 11/14/2018    HGB 13.7 11/14/2018    HCT 41.3 11/14/2018     11/14/2018    CHOL 196 11/14/2018    TRIG 93 11/14/2018    HDL 51 11/14/2018    ALT 18 11/14/2018    AST 27 11/14/2018     11/14/2018    K 4.6 11/14/2018     11/14/2018    CREATININE 0.79 11/14/2018    BUN 18 11/14/2018    CO2 29 11/14/2018    TSH 1.37 11/14/2018    INR 0.94 05/23/2017       Glucose   Date/Time Value Ref Range Status   11/14/2018 11:21 AM 97 70 - 125 mg/dL Final "   07/21/2018 04:53  70 - 125 mg/dL Final   05/23/2017 04:53  (H) 70 - 125 mg/dL Final   11/30/2016 12:15 PM 93 70 - 125 mg/dL Final     No results found for this or any previous visit (from the past 24 hour(s)).    RADIOLOGY: No results found.    Review of recent consultation-reviewed upper GI endoscopy    Reviewed esophageal study       Thee Lua MD - 02/14/2019 1:00 PM CST  Esophageal function testing analyzed and reviewed. Full report to be scanned in.    Esophageal function testing reveals a normal lower esophageal sphincter, with normal pressures and relaxation.  The resting LES pressure was 10 mmHg (normal 10-45 mmHg), with a residual pressure of 7 mmHg (normal <8.0 mmHg). Hiatal hernia noted.  Normal median IRP (12 mmHg, normal <20 mmHg), with a Type III EGJ phenotype on [employer] classification.  Normal esophageal body manometry, with normal peristalsis and amplitudes of contractions.  The distal esophageal amplitude, WILLIAM, was 63 mmHg (normal  mmHg).  Impedance testing reveals moderate bolus transit defect to liquid boluses.  Normal resting pressures of the upper esophageal sphincter.    Imp:  1. Normal LES; hiatal hernia noted.  2. Normal esophageal motility  3. Moderate bolus transit defect  4. Normal UES  No achalasia/diffuse esophageal spasm.    Rec:  - RTC with PMD, GI clinic,  as scheduled.    Thee Lua MD 2/27/2019, 4:18 PM              Reviewed ultrasound with patient.  Ovarian cyst benign

## 2021-07-03 NOTE — ADDENDUM NOTE
Addendum Note by Damaris Hoang MD at 7/9/2019  3:15 PM     Author: Damaris Hoang MD Service: -- Author Type: Physician    Filed: 7/9/2019  6:46 PM Encounter Date: 7/9/2019 Status: Signed    : Damaris Hoang MD (Physician)    Addended by: DAMARIS HOANG on: 7/9/2019 06:46 PM        Modules accepted: Level of Service

## 2021-07-22 ENCOUNTER — OFFICE VISIT (OUTPATIENT)
Dept: INTERNAL MEDICINE | Facility: CLINIC | Age: 81
End: 2021-07-22
Payer: MEDICARE

## 2021-07-22 ENCOUNTER — TELEPHONE (OUTPATIENT)
Dept: INTERNAL MEDICINE | Facility: CLINIC | Age: 81
End: 2021-07-22

## 2021-07-22 VITALS
OXYGEN SATURATION: 98 % | SYSTOLIC BLOOD PRESSURE: 130 MMHG | HEIGHT: 63 IN | DIASTOLIC BLOOD PRESSURE: 70 MMHG | HEART RATE: 66 BPM | WEIGHT: 141.2 LBS | BODY MASS INDEX: 25.02 KG/M2

## 2021-07-22 DIAGNOSIS — Z12.31 ENCOUNTER FOR SCREENING MAMMOGRAM FOR BREAST CANCER: ICD-10-CM

## 2021-07-22 DIAGNOSIS — N83.209 CYST OF OVARY, UNSPECIFIED LATERALITY: ICD-10-CM

## 2021-07-22 DIAGNOSIS — R73.03 PREDIABETES: ICD-10-CM

## 2021-07-22 DIAGNOSIS — Z00.00 HEALTH MAINTENANCE EXAMINATION: ICD-10-CM

## 2021-07-22 DIAGNOSIS — Z00.00 ENCOUNTER FOR ANNUAL WELLNESS EXAM IN MEDICARE PATIENT: ICD-10-CM

## 2021-07-22 DIAGNOSIS — K44.9 HIATAL HERNIA: ICD-10-CM

## 2021-07-22 DIAGNOSIS — K22.4 ESOPHAGEAL DYSFUNCTION: ICD-10-CM

## 2021-07-22 DIAGNOSIS — E78.5 HYPERLIPIDEMIA, UNSPECIFIED HYPERLIPIDEMIA TYPE: Primary | ICD-10-CM

## 2021-07-22 DIAGNOSIS — F51.01 PRIMARY INSOMNIA: ICD-10-CM

## 2021-07-22 DIAGNOSIS — Z79.899 CHRONIC PRESCRIPTION BENZODIAZEPINE USE: ICD-10-CM

## 2021-07-22 DIAGNOSIS — D32.9 MENINGIOMA (H): ICD-10-CM

## 2021-07-22 DIAGNOSIS — M81.0 AGE-RELATED OSTEOPOROSIS WITHOUT CURRENT PATHOLOGICAL FRACTURE: ICD-10-CM

## 2021-07-22 PROBLEM — K25.9 CAMERON ULCER: Status: ACTIVE | Noted: 2019-03-22

## 2021-07-22 LAB
ALBUMIN SERPL-MCNC: 3.8 G/DL (ref 3.5–5)
ALP SERPL-CCNC: 78 U/L (ref 45–120)
ALT SERPL W P-5'-P-CCNC: 11 U/L (ref 0–45)
ANION GAP SERPL CALCULATED.3IONS-SCNC: 9 MMOL/L (ref 5–18)
AST SERPL W P-5'-P-CCNC: 20 U/L (ref 0–40)
BILIRUB SERPL-MCNC: 0.6 MG/DL (ref 0–1)
BUN SERPL-MCNC: 21 MG/DL (ref 8–28)
CALCIUM SERPL-MCNC: 9 MG/DL (ref 8.5–10.5)
CHLORIDE BLD-SCNC: 106 MMOL/L (ref 98–107)
CHOLEST SERPL-MCNC: 159 MG/DL
CO2 SERPL-SCNC: 26 MMOL/L (ref 22–31)
CREAT SERPL-MCNC: 0.76 MG/DL (ref 0.6–1.1)
ERYTHROCYTE [DISTWIDTH] IN BLOOD BY AUTOMATED COUNT: 13.5 % (ref 10–15)
FASTING STATUS PATIENT QL REPORTED: YES
GFR SERPL CREATININE-BSD FRML MDRD: 74 ML/MIN/1.73M2
GLUCOSE BLD-MCNC: 105 MG/DL (ref 70–125)
HBA1C MFR BLD: 5.8 % (ref 0–5.6)
HCT VFR BLD AUTO: 38.3 % (ref 35–47)
HDLC SERPL-MCNC: 34 MG/DL
HGB BLD-MCNC: 12.3 G/DL (ref 11.7–15.7)
LDLC SERPL CALC-MCNC: 101 MG/DL
MCH RBC QN AUTO: 31.9 PG (ref 26.5–33)
MCHC RBC AUTO-ENTMCNC: 32.1 G/DL (ref 31.5–36.5)
MCV RBC AUTO: 99 FL (ref 78–100)
PLATELET # BLD AUTO: 102 10E3/UL (ref 150–450)
POTASSIUM BLD-SCNC: 4.3 MMOL/L (ref 3.5–5)
PROT SERPL-MCNC: 6.5 G/DL (ref 6–8)
RBC # BLD AUTO: 3.86 10E6/UL (ref 3.8–5.2)
SODIUM SERPL-SCNC: 141 MMOL/L (ref 136–145)
TRIGL SERPL-MCNC: 119 MG/DL
TSH SERPL DL<=0.005 MIU/L-ACNC: 1.54 UIU/ML (ref 0.3–5)
WBC # BLD AUTO: 3.3 10E3/UL (ref 4–11)

## 2021-07-22 PROCEDURE — G0009 ADMIN PNEUMOCOCCAL VACCINE: HCPCS | Performed by: INTERNAL MEDICINE

## 2021-07-22 PROCEDURE — 36415 COLL VENOUS BLD VENIPUNCTURE: CPT | Performed by: INTERNAL MEDICINE

## 2021-07-22 PROCEDURE — 80053 COMPREHEN METABOLIC PANEL: CPT | Performed by: INTERNAL MEDICINE

## 2021-07-22 PROCEDURE — 85027 COMPLETE CBC AUTOMATED: CPT | Performed by: INTERNAL MEDICINE

## 2021-07-22 PROCEDURE — 90670 PCV13 VACCINE IM: CPT | Performed by: INTERNAL MEDICINE

## 2021-07-22 PROCEDURE — 84443 ASSAY THYROID STIM HORMONE: CPT | Performed by: INTERNAL MEDICINE

## 2021-07-22 PROCEDURE — 83036 HEMOGLOBIN GLYCOSYLATED A1C: CPT | Performed by: INTERNAL MEDICINE

## 2021-07-22 PROCEDURE — G0439 PPPS, SUBSEQ VISIT: HCPCS | Performed by: INTERNAL MEDICINE

## 2021-07-22 PROCEDURE — 80061 LIPID PANEL: CPT | Performed by: INTERNAL MEDICINE

## 2021-07-22 RX ORDER — MIRTAZAPINE 15 MG/1
15 TABLET, FILM COATED ORAL AT BEDTIME
Qty: 34 TABLET | Refills: 1 | Status: SHIPPED | OUTPATIENT
Start: 2021-07-22 | End: 2021-07-23

## 2021-07-22 ASSESSMENT — MIFFLIN-ST. JEOR: SCORE: 1070.64

## 2021-07-22 ASSESSMENT — ACTIVITIES OF DAILY LIVING (ADL): CURRENT_FUNCTION: NO ASSISTANCE NEEDED

## 2021-07-22 NOTE — TELEPHONE ENCOUNTER
Reason for Call:  Medication or medication refill:      Do you use a [facility]?  Name of the pharmacy and phone number for the current request:  [facility] [address] [place] [address] in [facility]    Name of the medication requested:   mirtazapine (REMERON) 15 MG tablet 34 tablet 1 7/22/2021  --   Sig - Route: Take 1 tablet (15 mg) by mouth At Bedtime - Oral         Other request: med was sent to mail order and it is the wrong pharm.    Can we leave a detailed message on this number? YES    Phone number patient can be reached at: Home number on file [phone] (home)    Best Time: any    Call taken on 7/22/2021 at 2:36 PM by Pam J. Behr

## 2021-07-22 NOTE — LETTER
July 23, 2021      Emili Lucio  [address] [place] [address]        Dear ,    We are writing to inform you of your test results.    The electrolytes, kidney tests are all normal .   Liver tests are normal   Thyroid test is normal   The Aic test is a diabetic test . It is the average blood sugar over three months .  Your test is in the high normal or prediabetic range.  I do not think it is of any concern and we will continue to monitor it.  The blood counts are essentially normal in that do not have anemia.  The white count is slightly low and can be monitored.  The cholesterol shows a low HDL good cholesterol but bad cholesterol or LDL is also low.  You should continue your pravastatin.  Overall excellent test results.  Resulted Orders   Hemoglobin A1c   Result Value Ref Range    Hemoglobin A1C 5.8 (H) 0.0 - 5.6 %      Comment:      Normal <5.7%   Prediabetes 5.7-6.4%    Diabetes 6.5% or higher     Note: Adopted from ADA consensus guidelines.   TSH   Result Value Ref Range    TSH 1.54 0.30 - 5.00 uIU/mL   CBC with platelets   Result Value Ref Range    WBC Count 3.3 (L) 4.0 - 11.0 10e3/uL    RBC Count 3.86 3.80 - 5.20 10e6/uL    Hemoglobin 12.3 11.7 - 15.7 g/dL    Hematocrit 38.3 35.0 - 47.0 %    MCV 99 78 - 100 fL    MCH 31.9 26.5 - 33.0 pg    MCHC 32.1 31.5 - 36.5 g/dL    RDW 13.5 10.0 - 15.0 %    Platelet Count 102 (L) 150 - 450 10e3/uL   Comprehensive metabolic panel   Result Value Ref Range    Sodium 141 136 - 145 mmol/L    Potassium 4.3 3.5 - 5.0 mmol/L    Chloride 106 98 - 107 mmol/L    Carbon Dioxide (CO2) 26 22 - 31 mmol/L    Anion Gap 9 5 - 18 mmol/L    Urea Nitrogen 21 8 - 28 mg/dL    Creatinine 0.76 0.60 - 1.10 mg/dL    Calcium 9.0 8.5 - 10.5 mg/dL    Glucose 105 70 - 125 mg/dL    Alkaline Phosphatase 78 45 - 120 U/L    AST 20 0 - 40 U/L    ALT 11 0 - 45 U/L    Protein Total 6.5 6.0 - 8.0 g/dL    Albumin 3.8 3.5 - 5.0 g/dL    Bilirubin Total 0.6 0.0 - 1.0 mg/dL    GFR Estimate 74 >60  mL/min/1.73m2      Comment:      As of July 11, 2021, eGFR is calculated by the CKD-EPI creatinine equation, without race adjustment. eGFR can be influenced by muscle mass, exercise, and diet. The reported eGFR is an estimation only and is only applicable if the renal function is stable.   Lipid panel reflex to direct LDL Fasting   Result Value Ref Range    Cholesterol 159 <=199 mg/dL    Triglycerides 119 <=149 mg/dL    Direct Measure HDL 34 (L) >=50 mg/dL      Comment:      HDL Cholesterol Reference Range:     0-2 years:   No reference ranges established for patients under 2 years old  at [facility] for lipid analytes.    2-8 years:  Greater than 45 mg/dL     18 years and older:   Female: Greater than or equal to 50 mg/dL   Male:   Greater than or equal to 40 mg/dL    LDL Cholesterol Calculated 101 <=129 mg/dL    Patient Fasting > 8hrs? Yes        If you have any questions or concerns, please call the clinic at the number listed above.       Sincerely,      Leslie Desir MD

## 2021-07-22 NOTE — PROGRESS NOTES
SUBJECTIVE:   Emili Man is a 80 year old female who presents for Preventive Visit.      Patient has been advised of split billing requirements and indicates understanding: Yes   Are you in the first 12 months of your Medicare coverage?  No    HPI         Do you feel safe in your environment? Yes  Trial of remeron ,    Have you ever done Advance Care Planning? (For example, a Health Directive, POLST, or a discussion with a medical provider or your loved ones about your wishes): Yes, advance care planning is on file.  She feels comfortable with watchful waiting at this time. Will repeat ultrasound in one year to ensure continued stability. 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 Independent in ADLS and IADLS   Colon 2008   Fall risk  Fallen 2 or more times in the past year?: No  Any fall with injury in the past year?: No    Cognitive Screening   1) Repeat 3 items (Leader, Season, Table)    2) Clock draw: NORMAL  3) 3 item recall: Recalls 3 objects  Results: 3 items recalled: COGNITIVE IMPAIRMENT LESS LIKELY    Mini-CogTM Copyright S Estelita. Licensed by the author for use in [facility]; reprinted with permission (honorio@.[email or web address]). All rights reserved.          Reviewed and updated as needed this visit by clinical staff   Allergies  Meds              Reviewed and updated as needed this visit by Provider                Social History     Tobacco Use     Smoking status: Never Smoker     Smokeless tobacco: Never Used   Substance Use Topics     Alcohol use: Yes     Comment: Alcoholic Drinks/day: socially         Alcohol Use 7/22/2021   Prescreen: >3 drinks/day or >7 drinks/week? No               Current providers  "sharing in care for this patient include:   Patient Care Team:  Leslie Desir MD as PCP - General (Internal Medicine)  Leslie Desir MD as Assigned PCP    The following health maintenance items are reviewed in Epic and correct as of today:  Health Maintenance Due   Topic Date Due     DEXA  Never done     ANNUAL REVIEW OF HM ORDERS  Never done     ZOSTER IMMUNIZATION (2 of 3) 12/10/2007     MEDICARE ANNUAL WELLNESS VISIT  01/10/2021       Pneumonia Vaccine:Adults age 65+ who received Pneumovax (PPSV23) at 65 years or older: Should be given PCV13 > 1 year after their most recent PPSV23      Pertinent mammograms are reviewed under the imaging tab.    Review of Systems      OBJECTIVE:   There were no vitals taken for this visit. Estimated body mass index is 25.85 kg/m  as calculated from the following:    Height as of 1/10/20: 1.562 m (5' 1.5\").    Weight as of 1/10/20: 63.1 kg (139 lb 0.6 oz).  Physical Exam  GENERAL: healthy, alert and no distress  EYES: Eyes grossly normal to inspection, PERRL and conjunctivae and sclerae normal  HENT: ear canals and TM's normal, nose and mouth without ulcers or lesions  NECK: no adenopathy, no asymmetry, masses, or scars and thyroid normal to palpation  RESP: lungs clear to auscultation - no rales, rhonchi or wheezes  BREAST: normal without masses, tenderness or nipple discharge and no palpable axillary masses or adenopathy  CV: regular rate and rhythm, normal S1 S2, no S3 or S4, no murmur, click or rub, no peripheral edema and peripheral pulses strong  ABDOMEN: soft, nontender, no hepatosplenomegaly, no masses and bowel sounds normal  MS: no gross musculoskeletal defects noted, no edema  SKIN: no suspicious lesions or rashes  NEURO: Normal strength and tone, mentation intact and speech normal  PSYCH: mentation appears normal, affect normal/bright        ASSESSMENT / PLAN:   1. Hyperlipidemia, unspecified hyperlipidemia type  She does have a low HDL.  And is tolerating " pravastatin.  I do recommend her continuing on  - Lipid panel reflex to direct LDL Fasting; Future  - Comprehensive metabolic panel; Future  - CBC with platelets; Future  - TSH; Future  - TSH  - CBC with platelets  - Comprehensive metabolic panel  - Lipid panel reflex to direct LDL Fasting    2. Meningioma (right para clinoid)   She is getting annual MRIs.  This is has not been growing and is being watched    3. Health maintenance examination  mammo annual last 2019 I did tell her mammograms are optional but recommended as she is in good health  dexa not for ten years needs to be done bone density was ordered  Immunization flu complete , pertussis complete , needs  penumococcal.  She was given her pneumococcal booster and Shingrix she will get an outside pharmacy    Colon aged out she does have a remote history of colonoscopy around 2008.      4. Hiatal hernia    5. Esophageal dysfunction  She has had multiple endoscopies done.  She says her swallowing is fine    6. Cyst of ovary, unspecified laterality  She has a benign ovarian cyst that has been followed and she gets annual ultrasounds.    7. Encounter for annual wellness exam in Medicare patient  Overall a delightful lady living independently good family support.  Independent in her ADLs and IADLs with no big concerns.  8. Age-related osteoporosis without current pathological fracture    - DEXA HIP/PELVIS/SPINE - Future; Future    9. Chronic prescription benzodiazepine use  She was given temazepam by her prior physician.  She uses it intermittently and finds it helpful.  She has not tried trazodone or Remeron.  She is willing to try something else    10. Primary insomnia  She says that she never had a sleeping problem but ever since she took care of her elderly father passed away she has not been able to sleep well at night.  She is not anxious or clinically depressed.  It is just that he will still wake up sometimes at night that she has become a light sleeper.   "Temazepam works for her but she does have some daytime fogginess in the morning trial of Remeron.  If it does not work I do not think there is any harm in giving her long-term temazepam use.  Addictive risk in her particular case is very low.  She has signed medication contract with us in the event we need to start the temazepam again.  She will let me know by phone call if her Remeron is not effective    11. Encounter for screening mammogram for breast cancer    - *MA Screening Digital Bilateral; Future    12. Prediabetes    - Hemoglobin A1c; Future  - Hemoglobin A1c    Patient has been advised of split billing requirements and indicates understanding: Yes  COUNSELING:  Reviewed preventive health counseling, as reflected in patient instructions    Estimated body mass index is 25.85 kg/m  as calculated from the following:    Height as of 1/10/20: 1.562 m (5' 1.5\").    Weight as of 1/10/20: 63.1 kg (139 lb 0.6 oz).        She reports that she has never smoked. She has never used smokeless tobacco.      Appropriate preventive services were discussed with this patient, including applicable screening as appropriate for cardiovascular disease, diabetes, osteopenia/osteoporosis, and glaucoma.  As appropriate for age/gender, discussed screening for colorectal cancer, prostate cancer, breast cancer, and cervical cancer. Checklist reviewing preventive services available has been given to the patient.    Reviewed patients plan of care and provided an AVS. The Basic Care Plan (routine screening as documented in Health Maintenance) for Emili meets the Care Plan requirement. This Care Plan has been established and reviewed with the Patient.    Counseling Resources:  ATP IV Guidelines  Pooled Cohorts Equation Calculator  Breast Cancer Risk Calculator  Breast Cancer: Medication to Reduce Risk  FRAX Risk Assessment  ICSI Preventive Guidelines  Dietary Guidelines for Americans, 2010  USDA's MyPlate  ASA Prophylaxis  Lung CA " Screening    Leslie Desir MD  [facility]    Identified Health Risks:

## 2021-07-22 NOTE — LETTER
[facility]  07/22/21  Patient: Emili Man  YOB: 1940  Medical Record Number: [id number]                                                                                  Non-Opioid Controlled Substance Agreement    This is an agreement between you and your provider regarding safe and appropriate use of controlled substances prescribed by your care team. Controlled substances are?medicines that can cause physical and mental dependence (abuse).     There are strict laws about having and using these medicines. We here at [facility] are  committed to working with you in your efforts to get better. To support you in this work, we'll help you schedule regular office appointments for medicine refills. If we must cancel or change your appointment for any reason, we'll make sure you have enough medicine to last until your next appointment.     As a Provider, I will:     Listen carefully to your concerns while treating you with respect.     Recommend a treatment plan that I believe is in your best interest and may involve therapies other than medicine.      Talk with you often about the possible benefits and the risk of harm of any medicine that we prescribe for you.    Assess the safety of this medicine and check how well it works.      Provide a plan on how to taper (discontinue or go off) using this medicine if the decision is made to stop its use.      ::  As a Patient, I understand controlled substances:       Are prescribed by my care provider to help me function or work and manage my condition(s).?    Are strong medicines and can cause serious side effects.       Need to be taken exactly as prescribed.?Combining controlled substances with certain medicines or chemicals (such as illegal drugs, alcohol, sedatives, sleeping pills, and benzodiazepines) can be dangerous or even fatal.? If I stop taking my medicines suddenly, I may have severe withdrawal symptoms.     The risks,  benefits, and side effects of these medicine(s) were explained to me. I agree that:    1. I will take part in other treatments as advised by my care team. This may be psychiatry or counseling, physical therapy, behavioral therapy, group treatment or a referral to specialist.    2. I will keep all my appointments and understand this is part of the monitoring of controlled substances.?My care team may require an office visit for EVERY controlled substance refill. If I miss appointments or don t follow instructions, my care team may stop my medicine    3. I will take my medicines as prescribed. I will not change the dose or schedule unless my care team tells me to. There will be no refills if I run out early.      4. I may be asked to come to the clinic and complete a urine drug test or complete a pill count. If I don t give a urine sample or participate in a pill count, the care team may stop my medicine.    5. I will only receive controlled substance prescriptions from this clinic. If I am treated by another provider, I will tell them that I am taking controlled substances and that I have a treatment agreement with this provider. I will inform my [facility] care team within one business day if I am given a prescription for any controlled substance by another healthcare provider. My [facility] care team can contact other providers and pharmacists about my use of any medicines.    6. It is up to me to make sure that I don't run out of my medicines on weekends or holidays.?If my care team is willing to refill my prescription without a visit, I must request refills only during office hours. Refills may take up to 3 business days to process. I will use one pharmacy to fill all my controlled substance prescriptions. I will notify the clinic about any changes to my insurance or medicine availability.    7. I am responsible for my prescriptions. If the medicine/prescription is lost, stolen or destroyed, it will  not be replaced.?I also agree not to share controlled substance medicines with anyone.     8. I am aware I should not use any illegal or recreational drugs. I agree not to drink alcohol unless my care team says I can.     9. If I enroll in the [place] Medical Cannabis program, I will tell my care team before my next refill.    10. I will tell my care team right away if I become pregnant, have a new medical problem treated outside of my regular clinic, or have a change in my medicines.     11. I understand that this medicine can affect my thinking, judgment and reaction time.? Alcohol and drugs affect the brain and body, which can affect the safety of my driving. Being under the influence of alcohol or drugs can affect my decision-making, behaviors, personal safety and the safety of others. Driving while impaired (DWI) can occur if a person is driving, operating or in physical control of a car, motorcycle, boat, snowmobile, ATV, motorbike, off-road vehicle or any other motor vehicle ([place] Statute 169A.20). I understand the risk if I choose to drive or operate any vehicle or machinery.    I understand that if I do not follow any of the conditions above, my prescriptions or treatment may be stopped or changed.   I agree that my provider, clinic care team and pharmacy may work with any city, state or federal law enforcement agency that investigates the misuse, sale or other diversion of my controlled medicine. I will allow my provider to discuss my care with, or share a copy of, this agreement with any other treating provider, pharmacy or emergency room where I receive care.     I have read this agreement and have asked questions about anything I did not understand.    ________________________________________________________  Patient Signature - Emili Man     ___________________                   Date     ________________________________________________________  Provider Signature - Leslie Desir MD        ___________________                   Date     ________________________________________________________  Witness Signature (required if provider not present while patient signing)          ___________________                   Date

## 2021-07-23 RX ORDER — MIRTAZAPINE 15 MG/1
15 TABLET, FILM COATED ORAL AT BEDTIME
Qty: 34 TABLET | Refills: 1 | Status: SHIPPED | OUTPATIENT
Start: 2021-07-23 | End: 2021-08-20

## 2021-07-26 ENCOUNTER — TELEPHONE (OUTPATIENT)
Dept: INTERNAL MEDICINE | Facility: CLINIC | Age: 81
End: 2021-07-26

## 2021-08-03 ENCOUNTER — ANCILLARY PROCEDURE (OUTPATIENT)
Dept: BONE DENSITY | Facility: CLINIC | Age: 81
End: 2021-08-03
Attending: INTERNAL MEDICINE
Payer: MEDICARE

## 2021-08-03 DIAGNOSIS — M81.0 AGE-RELATED OSTEOPOROSIS WITHOUT CURRENT PATHOLOGICAL FRACTURE: ICD-10-CM

## 2021-08-03 PROCEDURE — 77080 DXA BONE DENSITY AXIAL: CPT | Performed by: INTERNAL MEDICINE

## 2021-08-04 ENCOUNTER — TELEPHONE (OUTPATIENT)
Dept: INTERNAL MEDICINE | Facility: CLINIC | Age: 81
End: 2021-08-04

## 2021-08-04 NOTE — TELEPHONE ENCOUNTER
Reason for Call:  Other call back    Detailed comments: Pt calling stating she is concerned regarding prediabetic DX - wondering about seeing a nutritionist about diet    Please advise    Phone Number Patient can be reached at: Cell number on file:    No relevant phone numbers on file.       Best Time: anytime    Can we leave a detailed message on this number? YES    Call taken on 8/4/2021 at 10:04 AM by Peyton Lozano

## 2021-08-05 ENCOUNTER — HOSPITAL ENCOUNTER (OUTPATIENT)
Dept: MRI IMAGING | Facility: HOSPITAL | Age: 81
Discharge: HOME | End: 2021-08-05
Attending: RADIOLOGY | Admitting: RADIOLOGY
Payer: MEDICARE

## 2021-08-05 DIAGNOSIS — D32.9 MENINGIOMA (H): ICD-10-CM

## 2021-08-05 PROCEDURE — 255N000002 HC RX 255 OP 636: Performed by: RADIOLOGY

## 2021-08-05 PROCEDURE — 70553 MRI BRAIN STEM W/O & W/DYE: CPT

## 2021-08-05 PROCEDURE — A9585 GADOBUTROL INJECTION: HCPCS | Performed by: RADIOLOGY

## 2021-08-05 RX ORDER — GADOBUTROL 604.72 MG/ML
7 INJECTION INTRAVENOUS ONCE
Status: COMPLETED | OUTPATIENT
Start: 2021-08-05 | End: 2021-08-05

## 2021-08-05 RX ADMIN — GADOBUTROL 7 ML: 604.72 INJECTION INTRAVENOUS at 10:56

## 2021-08-20 ENCOUNTER — OFFICE VISIT (OUTPATIENT)
Dept: RADIATION ONCOLOGY | Facility: HOSPITAL | Age: 81
End: 2021-08-20
Attending: RADIOLOGY
Payer: MEDICARE

## 2021-08-20 VITALS
HEART RATE: 70 BPM | DIASTOLIC BLOOD PRESSURE: 78 MMHG | RESPIRATION RATE: 16 BRPM | SYSTOLIC BLOOD PRESSURE: 136 MMHG | BODY MASS INDEX: 25.07 KG/M2 | WEIGHT: 140.4 LBS | OXYGEN SATURATION: 99 %

## 2021-08-20 DIAGNOSIS — D32.9 MENINGIOMA (H): Primary | ICD-10-CM

## 2021-08-20 PROCEDURE — 99215 OFFICE O/P EST HI 40 MIN: CPT | Performed by: RADIOLOGY

## 2021-08-20 PROCEDURE — G0463 HOSPITAL OUTPT CLINIC VISIT: HCPCS

## 2021-08-20 NOTE — PROGRESS NOTES
[facility] Radiation Oncology Follow Up Note    Patient: Emili Man  MRN: [id number]  Date of Service: 08/20/2021    Assessment:       ICD-10-CM    1. Meningioma (H)  D32.9         Impression/Plan:   80 year old female S/P CK radiation to right paraclinoid region, 2500 cGy/5 fractions completed on 7/8/15.      1. MRI Head w/wo contrast 8/5/2021, tumor is stable with patent vessels. No evidence of new or recurrent disease. Imaging results discussed with patient.    2. Follow up in 2 years for repeat MRI head w/wo contrast and routine office visit. Sooner if any concerns.     3. COntinue annual appointment with ophthalmologist.     Subjective:     HPI:  Emili Man is a 80 year old female was treated with Cyberknife stereotactic radiosurgery for a meningioma located in right prerclinoid region 2.5x1.8x2.2 cm adjacent to chiasm, right optic nerve, encasing regional arteries and invasive locally precluding surgical intervention.       SITE TREATED: right para clinoid region    TOTAL DOSE: 2500    NUMBER OF FRACTION: 5    DATES COMPLETED: 7/8/2015    Emili tolerated the treatment without unexpected sides effects.   No visual sx. Had left cataract removed with trifocal replacement. Had the other eye done and is very happy with result. No visual changes.  Scan 1/12/2017 shows stable to slightly smaller. Vessels patent.      The patient presents today for routine office visit. She denies any symptoms. Has been quite active lately without any difficulties.     Past Medical History:   Diagnosis Date     High cholesterol      Meningioma (H) 05/2015    cyberknife irradiation     Osteoarthritis      Positive anti-CCP test      Vertigo      Past Surgical History:   Procedure Laterality Date     CATARACT EXTRACTION, BILATERAL Bilateral 2016     DILATION AND CURETTAGE  6/3/03     HERNIA REPAIR       HERNIA REPAIR  6/25/99 & 8/28/07     OTHER SURGICAL HISTORY      hemrroidectomy     TUBAL LIGATION  8/31/76     Current  Outpatient Medications   Medication     pravastatin (PRAVACHOL) 20 MG tablet     temazepam (RESTORIL) 15 mg capsule     No current facility-administered medications for this visit.     Patient has no known allergies.  Social History     Socioeconomic History     Marital status: Single     Spouse name: Not on file     Number of children: Not on file     Years of education: Not on file     Highest education level: Not on file   Occupational History     Not on file   Tobacco Use     Smoking status: Never Smoker     Smokeless tobacco: Never Used   Substance and Sexual Activity     Alcohol use: Yes     Comment: Alcoholic Drinks/day: socially     Drug use: No     Sexual activity: Not on file   Other Topics Concern     Not on file   Social History Narrative    Retired [employer] 1995Active Harlan [id number]-[id number]-Samantha    completely independent ,  never  , single parent , son 60 .used to be a runner , looked after her father , does free weights . 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     Social Determinants of Health     Financial Resource Strain:      Difficulty of Paying Living Expenses:    Food Insecurity:      Worried About Running Out of Food in the Last Year:      Ran Out of Food in the Last Year:    Transportation Needs:      Lack of Transportation (Medical):      Lack of Transportation (Non-Medical):    Physical Activity:      Days of Exercise per Week:      Minutes of Exercise per Session:    Stress:      Feeling of Stress :    Social Connections:      Frequency of Communication with Friends and Family:      Frequency of Social Gatherings with Friends and Family:      Attends [employer] Services:      Active Member of Clubs or Organizations:      Attends Club or Organization Meetings:      Marital Status:    Intimate Partner Violence:      Fear of Current or Ex-Partner:      Emotionally Abused:      Physically Abused:      Sexually Abused:        ROS:   Reviewed with Emili Man today.      General  Constitutional  Constitutional (WDL): All constitutional elements are within defined limits  EENT  Eye Disorders  Eye Disorder (WDL): All eye disorder elements are within defined limits  Ear Disorders  Ear Disorder (WDL): All ear disorder elements are within defined limits  Respiratory    Respiratory  Respiratory (WDL): All respiratory elements are within defined limits  Cardiovascular  Cardiovascular  Cardiovascular (WDL): All cardiovascular elements are within defined limits  Gastrointestinal  Gastrointestinal  Gastrointestinal (WDL): All gastrointestinal elements are within defined limits  Musculoskeletal  Musculoskeletal and Connective Tissue Disorders  Musculoskeletal & Connective (WDL): Exceptions to WDL  Arthralgia: Mild pain (rachael hands)  Integumentary              Integumentary  Integumentary (WDL): All integumentary elements are within defined limits  Neurological  Neurosensory  Neurosensory (WDL): All neurosensory elements are within defined limits  Genitourinary/Reproductive  Genitourinary  Genitourinary (WDL): All genitourinary elements are within defined limits  Lymphatic   Lymph System Disorders  Lymph (WDL): All lymph elements are within defined limits  Patient Coping  Patient Coping: Accepting  Pain   Pain Score: No Pain (0)                                                                 Accompanied by  Accompanied By: self only    Objective:        Exam:    Vitals:    08/20/21 1000 08/20/21 1007   BP:  136/78   Pulse:  70   Resp:  16   SpO2:  99%   Weight:  63.7 kg (140 lb 6.4 oz)   PainSc: No Pain (0)      GENERAL: No acute distress. Cooperative in conversation. Mask on.   RESP: Normal respiratory effort.   NEURO: Non focal. Alert and oriented x3.   PSYCH: Within normal limits. No depression or anxiety.  SKIN: Warm dry intact.         Recent Labs: No results found for this or any previous visit (from the past 168 hour(s)).    Imaging: Imaging  results 30 days: MR Brain w/o & w Contrast    Result Date: 8/5/2021  EXAM: MR BRAIN W/O and W CONTRAST LOCATION: [facility] DATE/TIME: 8/5/2021 10:23 AM INDICATION: Meningioma. COMPARISON: MRI brain 8/4/2020 and 7/2/2018. CONTRAST: 7mL Gadavist TECHNIQUE: Routine multiplanar multisequence head MRI without and with intravenous contrast. FINDINGS: INTRACRANIAL CONTENTS: No areas of abnormally restricted diffusion to suggest acute or subacute infarct. No areas of abnormal parenchymal susceptibility. Again seen is an extra-axial dural based mass centered along the right clinoid process and extending  along the ventral medial right middle cranial fossa and posterior floor of the right anterior cranial fossa. Although nonspecific appearance is compatible with a meningioma measuring roughly 2.5 x 2.4 x 2.3 cm in size (CC, TRV, AP). The lesion measures 2.5 x 2.3 x 2.4 cm in size on the 8/4/2020 exam and 2.4 x 2.5 x 2.4 cm on the 7/2/2018 exam. Differences in size are favored to reflect differences in slice selection/technique. The lesion is again seen to encase the right carotid terminus and proximal M1 segment and fill the right lateral aspect of the sella. The lesion is again seen to abut the posterior right optic nerve and chiasm which are slightly displaced to the left. Although there is local brain parenchymal abutment, no associated vasogenic edema is seen. Ventricles, sulci, and cisterns are unremarkable in size for age. Small nonenhancing focus of FLAIR hyperintensity right frontal white matter compatible with a tiny focus of gliosis and/or chronic myelin loss. Cerebellar tonsils are normally positioned. Visualized upper cervical spinal cord and corpus callosum are unremarkable. Major skull base vascular flow voids are preserved. OSSEOUS STRUCTURES/SOFT TISSUES: Visualized marrow space is unremarkable. ORBITS: Prior cataract surgeries. SINUSES/MASTOIDS: No paranasal sinus mucosal disease. No  middle ear or mastoid effusion.     IMPRESSION: 1.  Stable appearance of presumed right paraclinoid meningioma compared to 7/2/2018. 2.  No finding for acute infarct, hemorrhage, or other mass.     DEXA HIP/PELVIS/SPINE - Future    Result Date: 8/4/2021  8/3/2021 RE: Emili Man YOB: 1940 Dear Leslie Desir, Patient Profile: 80 year old female, postmenopausal, is here for the first bone density test at this site. History of fractures - None. Family history of osteoporosis - None.  Family history of hip fracture: None. Smoking history - No. Osteoporosis treatment past -  No. Osteoporosis treatment current - No.  Chronic medical problems - None. High risk medications -  None. Assessment: 1. The spine bone density is assessed using L1-L3 with T-score of 1.5.. 2. Femoral bone densities show left femoral neck T- score of -1.6, and right femoral neck T-score of -1.7.. 3.  Bone density was also checked in the wrist as an alternate site since the spine measurement was unreliable due to multilevel degenerative change.  In the left 33% radius, T score is -2.2. 4.  She does not have a previous scan available for comparison. 5.  The trabecular bone score suggests moderate micro architecture. 80 year old female with LOW BONE DENSITY (OSTEOPENIA) and MODERATE predicted hip fracture risk with a TBS adjusted 10-year major osteoporotic fracture risk of 9.6% and hip fracture risk of 2.7%. Recommendations: Ensure adequate calcium, vitamin D intake, and regular weightbearing exercise with a recheck in 2 years.. Bone densitometry was performed on your patient using our [employer] densitometer. The results are summarized and a copy of the actual scans are included for your review. In conformity with the International Society of Clinical Densitometry's most recent position statement for DXA interpretation (2015), the diagnosis will be made on the lowest measured T-score of the lumbar spine, femoral neck, total  proximal femur or 33% radius. Note the change in terminology for diagnostic classification from OSTEOPENIA to LOW BONE MASS. All trending for sequential exams will be done using multiple vertebrae or the total proximal femur. Fracture risk is based on the WHO Fracture Risk Assessment Tool (FRAX). If additional information is needed or if you would like to discuss the results, please do not hesitate to call me. Thank you for referring this patient to [facility]. We are happy to be of service in support of you and your practice. If you have any questions or suggestions to improve our service, please call me at [phone]. Sincerely, Kale Rivers M.D. C.C.TALAT. Osteoporosis [facility], [facility]        45 minutes spent on the date of the encounter doing chart review, review of test results, interpretation of tests, patient visit, documentation, personal review of serial MRI       I, Damaris Hoang MD personally performed the services described in this documentation, as scribed by Ariel Lowe in my presence, and it is both accurate and complete.    Signed by: Damaris Hoang MD, MPH

## 2021-08-20 NOTE — LETTER
8/20/2021         RE: Emili Man  [address] [place] [address]        Dear Colleague,    Thank you for referring your patient, Emili Man, to the [facility] RADIATION [facility]COL[facility] [place]. Please see a copy of my visit note below.    [facility] Radiation Oncology Follow Up Note    Patient: Emili Man  MRN: [id number]  Date of Service: 08/20/2021    Assessment:       ICD-10-CM    1. Meningioma (H)  D32.9         Impression/Plan:   80 year old female S/P CK radiation to right paraclinoid region, 2500 cGy/5 fractions completed on 7/8/15.      1. MRI Head w/wo contrast 8/5/2021, tumor is stable with patent vessels. No evidence of new or recurrent disease. Imaging results discussed with patient.    2. Follow up in 2 years for repeat MRI head w/wo contrast and routine office visit. Sooner if any concerns.     3. COntinue annual appointment with ophthalmologist.     Subjective:     HPI:  Emili Man is a 80 year old female was treated with Cyberknife stereotactic radiosurgery for a meningioma located in right prerclinoid region 2.5x1.8x2.2 cm adjacent to chiasm, right optic nerve, encasing regional arteries and invasive locally precluding surgical intervention.       SITE TREATED: right para clinoid region    TOTAL DOSE: 2500    NUMBER OF FRACTION: 5    DATES COMPLETED: 7/8/2015    Emili tolerated the treatment without unexpected sides effects.   No visual sx. Had left cataract removed with trifocal replacement. Had the other eye done and is very happy with result. No visual changes.  Scan 1/12/2017 shows stable to slightly smaller. Vessels patent.      The patient presents today for routine office visit. She denies any symptoms. Has been quite active lately without any difficulties.     Past Medical History:   Diagnosis Date     High cholesterol      Meningioma (H) 05/2015    cyberknife irradiation     Osteoarthritis      Positive anti-CCP test      Vertigo      Past Surgical History:    Procedure Laterality Date     CATARACT EXTRACTION, BILATERAL Bilateral 2016     DILATION AND CURETTAGE  6/3/03     HERNIA REPAIR       HERNIA REPAIR  6/25/99 & 8/28/07     OTHER SURGICAL HISTORY      hemrroidectomy     TUBAL LIGATION  8/31/76     Current Outpatient Medications   Medication     pravastatin (PRAVACHOL) 20 MG tablet     temazepam (RESTORIL) 15 mg capsule     No current facility-administered medications for this visit.     Patient has no known allergies.  Social History     Socioeconomic History     Marital status: Single     Spouse name: Not on file     Number of children: Not on file     Years of education: Not on file     Highest education level: Not on file   Occupational History     Not on file   Tobacco Use     Smoking status: Never Smoker     Smokeless tobacco: Never Used   Substance and Sexual Activity     Alcohol use: Yes     Comment: Alcoholic Drinks/day: socially     Drug use: No     Sexual activity: Not on file   Other Topics Concern     Not on file   Social History Narrative    Retired [employer] 1995Active womanOdellNoah KeaneHoxoamz-[id number]-CelwcbHdjgmucscnofs-Samantha    completely independent ,  never  , single parent , son 60 .used to be a runner , looked after her father , does free weights . 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     Social Determinants of Health     Financial Resource Strain:      Difficulty of Paying Living Expenses:    Food Insecurity:      Worried About Running Out of Food in the Last Year:      Ran Out of Food in the Last Year:    Transportation Needs:      Lack of Transportation (Medical):      Lack of Transportation (Non-Medical):    Physical Activity:      Days of Exercise per Week:      Minutes of Exercise per Session:    Stress:      Feeling of Stress :    Social Connections:      Frequency of Communication with Friends and Family:      Frequency of Social Gatherings with Friends and Family:      Attends Spiritism Services:       Active Member of Clubs or Organizations:      Attends Club or Organization Meetings:      Marital Status:    Intimate Partner Violence:      Fear of Current or Ex-Partner:      Emotionally Abused:      Physically Abused:      Sexually Abused:        ROS:  Reviewed with Emili perez.      General  Constitutional  Constitutional (WDL): All constitutional elements are within defined limits  EENT  Eye Disorders  Eye Disorder (WDL): All eye disorder elements are within defined limits  Ear Disorders  Ear Disorder (WDL): All ear disorder elements are within defined limits  Respiratory    Respiratory  Respiratory (WDL): All respiratory elements are within defined limits  Cardiovascular  Cardiovascular  Cardiovascular (WDL): All cardiovascular elements are within defined limits  Gastrointestinal  Gastrointestinal  Gastrointestinal (WDL): All gastrointestinal elements are within defined limits  Musculoskeletal  Musculoskeletal and Connective Tissue Disorders  Musculoskeletal & Connective (WDL): Exceptions to WDL  Arthralgia: Mild pain (rachael hands)  Integumentary              Integumentary  Integumentary (WDL): All integumentary elements are within defined limits  Neurological  Neurosensory  Neurosensory (WDL): All neurosensory elements are within defined limits  Genitourinary/Reproductive  Genitourinary  Genitourinary (WDL): All genitourinary elements are within defined limits  Lymphatic   Lymph System Disorders  Lymph (WDL): All lymph elements are within defined limits  Patient Coping  Patient Coping: Accepting  Pain   Pain Score: No Pain (0)                                                                 Accompanied by  Accompanied By: self only    Objective:        Exam:    Vitals:    08/20/21 1000 08/20/21 1007   BP:  136/78   Pulse:  70   Resp:  16   SpO2:  99%   Weight:  63.7 kg (140 lb 6.4 oz)   PainSc: No Pain (0)      GENERAL: No acute distress. Cooperative in conversation. Mask on.   RESP: Normal  respiratory effort.   NEURO: Non focal. Alert and oriented x3.   PSYCH: Within normal limits. No depression or anxiety.  SKIN: Warm dry intact.         Recent Labs: No results found for this or any previous visit (from the past 168 hour(s)).    Imaging: Imaging results 30 days: MR Brain w/o & w Contrast    Result Date: 8/5/2021  EXAM: MR BRAIN W/O and W CONTRAST LOCATION: [facility] DATE/TIME: 8/5/2021 10:23 AM INDICATION: Meningioma. COMPARISON: MRI brain 8/4/2020 and 7/2/2018. CONTRAST: 7mL Gadavist TECHNIQUE: Routine multiplanar multisequence head MRI without and with intravenous contrast. FINDINGS: INTRACRANIAL CONTENTS: No areas of abnormally restricted diffusion to suggest acute or subacute infarct. No areas of abnormal parenchymal susceptibility. Again seen is an extra-axial dural based mass centered along the right clinoid process and extending  along the ventral medial right middle cranial fossa and posterior floor of the right anterior cranial fossa. Although nonspecific appearance is compatible with a meningioma measuring roughly 2.5 x 2.4 x 2.3 cm in size (CC, TRV, AP). The lesion measures 2.5 x 2.3 x 2.4 cm in size on the 8/4/2020 exam and 2.4 x 2.5 x 2.4 cm on the 7/2/2018 exam. Differences in size are favored to reflect differences in slice selection/technique. The lesion is again seen to encase the right carotid terminus and proximal M1 segment and fill the right lateral aspect of the sella. The lesion is again seen to abut the posterior right optic nerve and chiasm which are slightly displaced to the left. Although there is local brain parenchymal abutment, no associated vasogenic edema is seen. Ventricles, sulci, and cisterns are unremarkable in size for age. Small nonenhancing focus of FLAIR hyperintensity right frontal white matter compatible with a tiny focus of gliosis and/or chronic myelin loss. Cerebellar tonsils are normally positioned. Visualized upper cervical  spinal cord and corpus callosum are unremarkable. Major skull base vascular flow voids are preserved. OSSEOUS STRUCTURES/SOFT TISSUES: Visualized marrow space is unremarkable. ORBITS: Prior cataract surgeries. SINUSES/MASTOIDS: No paranasal sinus mucosal disease. No middle ear or mastoid effusion.     IMPRESSION: 1.  Stable appearance of presumed right paraclinoid meningioma compared to 7/2/2018. 2.  No finding for acute infarct, hemorrhage, or other mass.     DEXA HIP/PELVIS/SPINE - Future    Result Date: 8/4/2021  8/3/2021 RE: Emili Man YOB: 1940 Dear Leslie Desir, Patient Profile: 80 year old female, postmenopausal, is here for the first bone density test at this site. History of fractures - None. Family history of osteoporosis - None.  Family history of hip fracture: None. Smoking history - No. Osteoporosis treatment past -  No. Osteoporosis treatment current - No.  Chronic medical problems - None. High risk medications -  None. Assessment: 1. The spine bone density is assessed using L1-L3 with T-score of 1.5.. 2. Femoral bone densities show left femoral neck T- score of -1.6, and right femoral neck T-score of -1.7.. 3.  Bone density was also checked in the wrist as an alternate site since the spine measurement was unreliable due to multilevel degenerative change.  In the left 33% radius, T score is -2.2. 4.  She does not have a previous scan available for comparison. 5.  The trabecular bone score suggests moderate micro architecture. 80 year old female with LOW BONE DENSITY (OSTEOPENIA) and MODERATE predicted hip fracture risk with a TBS adjusted 10-year major osteoporotic fracture risk of 9.6% and hip fracture risk of 2.7%. Recommendations: Ensure adequate calcium, vitamin D intake, and regular weightbearing exercise with a recheck in 2 years.. Bone densitometry was performed on your patient using our [employer] Mobility - Onvelop densitometer. The results are summarized and a copy of the actual scans  are included for your review. In conformity with the International Society of Clinical Densitometry's most recent position statement for DXA interpretation (2015), the diagnosis will be made on the lowest measured T-score of the lumbar spine, femoral neck, total proximal femur or 33% radius. Note the change in terminology for diagnostic classification from OSTEOPENIA to LOW BONE MASS. All trending for sequential exams will be done using multiple vertebrae or the total proximal femur. Fracture risk is based on the WHO Fracture Risk Assessment Tool (FRAX). If additional information is needed or if you would like to discuss the results, please do not hesitate to call me. Thank you for referring this patient to [facility]. We are happy to be of service in support of you and your practice. If you have any questions or suggestions to improve our service, please call me at [phone]. Sincerely, Kale Rivers M.D. CAllieCSKY. Osteoporosis Services, [facility]        45 minutes spent on the date of the encounter doing chart review, review of test results, interpretation of tests, patient visit, documentation, personal review of serial MRI       I, Damaris Hoang MD personally performed the services described in this documentation, as scribed by Ariel Lowe in my presence, and it is both accurate and complete.    Signed by: Damaris Hoang MD, MPH      Pt ambulatory to radiation clinic for follow up. MRI done 8/5/21. Further recommendations and orders per provider.      Again, thank you for allowing me to participate in the care of your patient.        Sincerely,        Damaris Hoang MD

## 2021-08-20 NOTE — PROGRESS NOTES
Pt ambulatory to radiation clinic for follow up. MRI done 8/5/21. Further recommendations and orders per provider.

## 2021-12-16 DIAGNOSIS — G47.00 INSOMNIA, UNSPECIFIED TYPE: ICD-10-CM

## 2021-12-19 NOTE — TELEPHONE ENCOUNTER
Routing refill request to provider for review/approval because:  Controlled substance request    Last Written Prescription Date:  11/24/20  Last Fill Quantity: 90,  # refills: 3   Last office visit provider:  7/22/21     Requested Prescriptions   Pending Prescriptions Disp Refills     temazepam (RESTORIL) 15 MG capsule [Pharmacy Med Name: TEMAZEPAM 15MG CAPSULES] 90 capsule      Sig: TAKE 1 CAPSULE(15 MG) BY MOUTH AT BEDTIME AS NEEDED FOR SLEEP       There is no refill protocol information for this order          Reggie Way RN 12/19/21 9:56 AM

## 2021-12-20 RX ORDER — TEMAZEPAM 15 MG/1
CAPSULE ORAL
Qty: 90 CAPSULE | Refills: 0 | Status: SHIPPED | OUTPATIENT
Start: 2021-12-20 | End: 2022-03-29

## 2021-12-20 NOTE — TELEPHONE ENCOUNTER
Medication: temazepam  Last Date Filled 11/24/21   pulled: PROVIDER TO PULL FROM Epic.     Only PCP Prescribing? PROVIDER TO PULL  FROM Epic.  Taken as prescribed from physician notes? YES    CSA in last year: NO  Random Utox in last year: NO  (if any of the above answer NO - schedule with PCP)     Opioids + benzodiazepines? NO  (if the above answer YES - schedule with PCP every 6 months)     Is patient on the Executive Review Team? no      All responses suggest: Refilling prescription

## 2022-01-25 DIAGNOSIS — E78.5 HYPERLIPIDEMIA: ICD-10-CM

## 2022-01-26 RX ORDER — PRAVASTATIN SODIUM 20 MG
TABLET ORAL
Qty: 90 TABLET | Refills: 1 | Status: SHIPPED | OUTPATIENT
Start: 2022-01-26 | End: 2022-07-23

## 2022-01-26 NOTE — TELEPHONE ENCOUNTER
"Last Written Prescription Date:  1/28/21  Last Fill Quantity: 90,  # refills: 3   Last office visit provider:  7/22/21    Requested Prescriptions   Pending Prescriptions Disp Refills     pravastatin (PRAVACHOL) 20 MG tablet [Pharmacy Med Name: PRAVASTATIN 20MG TABLETS] 90 tablet 3     Sig: TAKE 1 TABLET(20 MG) BY MOUTH DAILY       Statins Protocol Passed - 1/25/2022  4:00 AM        Passed - LDL on file in past 12 months     Recent Labs   Lab Test 07/22/21  1021                Passed - No abnormal creatine kinase in past 12 months     No lab results found.             Passed - Recent (12 mo) or future (30 days) visit within the authorizing provider's specialty     Patient has had an office visit with the authorizing provider or a provider within the authorizing providers department within the previous 12 mos or has a future within next 30 days. See \"Patient Info\" tab in inbasket, or \"Choose Columns\" in Meds & Orders section of the refill encounter.              Passed - Medication is active on med list        Passed - Patient is age 18 or older        Passed - No active pregnancy on record        Passed - No positive pregnancy test in past 12 months             Malaika Cabrera RN 01/26/22 2:30 PM  "

## 2022-03-29 ENCOUNTER — LAB (OUTPATIENT)
Dept: LAB | Facility: CLINIC | Age: 82
End: 2022-03-29
Payer: MEDICARE

## 2022-03-29 ENCOUNTER — OFFICE VISIT (OUTPATIENT)
Dept: INTERNAL MEDICINE | Facility: CLINIC | Age: 82
End: 2022-03-29
Payer: MEDICARE

## 2022-03-29 VITALS
TEMPERATURE: 97.9 F | SYSTOLIC BLOOD PRESSURE: 135 MMHG | DIASTOLIC BLOOD PRESSURE: 67 MMHG | HEIGHT: 62 IN | BODY MASS INDEX: 26.22 KG/M2 | RESPIRATION RATE: 14 BRPM | HEART RATE: 59 BPM | OXYGEN SATURATION: 96 % | WEIGHT: 142.5 LBS

## 2022-03-29 DIAGNOSIS — Z01.818 PREOP EXAM FOR INTERNAL MEDICINE: Primary | ICD-10-CM

## 2022-03-29 DIAGNOSIS — G47.00 INSOMNIA, UNSPECIFIED TYPE: ICD-10-CM

## 2022-03-29 DIAGNOSIS — Z20.822 ENCOUNTER FOR LABORATORY TESTING FOR COVID-19 VIRUS: ICD-10-CM

## 2022-03-29 DIAGNOSIS — N83.299 COMPLEX OVARIAN CYST: ICD-10-CM

## 2022-03-29 LAB
ABO/RH(D): NORMAL
ANION GAP SERPL CALCULATED.3IONS-SCNC: 7 MMOL/L (ref 5–18)
ANTIBODY SCREEN: NEGATIVE
BUN SERPL-MCNC: 20 MG/DL (ref 8–28)
CALCIUM SERPL-MCNC: 9.3 MG/DL (ref 8.5–10.5)
CHLORIDE BLD-SCNC: 108 MMOL/L (ref 98–107)
CO2 SERPL-SCNC: 27 MMOL/L (ref 22–31)
CREAT SERPL-MCNC: 1.15 MG/DL (ref 0.6–1.1)
ERYTHROCYTE [DISTWIDTH] IN BLOOD BY AUTOMATED COUNT: 13.6 % (ref 10–15)
GFR SERPL CREATININE-BSD FRML MDRD: 47 ML/MIN/1.73M2
GLUCOSE BLD-MCNC: 103 MG/DL (ref 70–125)
HCT VFR BLD AUTO: 39.3 % (ref 35–47)
HGB BLD-MCNC: 12.6 G/DL (ref 11.7–15.7)
MCH RBC QN AUTO: 32 PG (ref 26.5–33)
MCHC RBC AUTO-ENTMCNC: 32.1 G/DL (ref 31.5–36.5)
MCV RBC AUTO: 100 FL (ref 78–100)
PLATELET # BLD AUTO: 106 10E3/UL (ref 150–450)
POTASSIUM BLD-SCNC: 4.6 MMOL/L (ref 3.5–5)
RBC # BLD AUTO: 3.94 10E6/UL (ref 3.8–5.2)
SODIUM SERPL-SCNC: 142 MMOL/L (ref 136–145)
SPECIMEN EXPIRATION DATE: NORMAL
WBC # BLD AUTO: 5 10E3/UL (ref 4–11)

## 2022-03-29 PROCEDURE — 99214 OFFICE O/P EST MOD 30 MIN: CPT | Performed by: INTERNAL MEDICINE

## 2022-03-29 PROCEDURE — 93005 ELECTROCARDIOGRAM TRACING: CPT | Performed by: INTERNAL MEDICINE

## 2022-03-29 PROCEDURE — 86850 RBC ANTIBODY SCREEN: CPT | Performed by: INTERNAL MEDICINE

## 2022-03-29 PROCEDURE — 80048 BASIC METABOLIC PNL TOTAL CA: CPT | Performed by: INTERNAL MEDICINE

## 2022-03-29 PROCEDURE — 85027 COMPLETE CBC AUTOMATED: CPT | Performed by: INTERNAL MEDICINE

## 2022-03-29 PROCEDURE — U0005 INFEC AGEN DETEC AMPLI PROBE: HCPCS

## 2022-03-29 PROCEDURE — 86901 BLOOD TYPING SEROLOGIC RH(D): CPT | Performed by: INTERNAL MEDICINE

## 2022-03-29 PROCEDURE — 86900 BLOOD TYPING SEROLOGIC ABO: CPT | Performed by: INTERNAL MEDICINE

## 2022-03-29 PROCEDURE — U0003 INFECTIOUS AGENT DETECTION BY NUCLEIC ACID (DNA OR RNA); SEVERE ACUTE RESPIRATORY SYNDROME CORONAVIRUS 2 (SARS-COV-2) (CORONAVIRUS DISEASE [COVID-19]), AMPLIFIED PROBE TECHNIQUE, MAKING USE OF HIGH THROUGHPUT TECHNOLOGIES AS DESCRIBED BY CMS-2020-01-R: HCPCS

## 2022-03-29 PROCEDURE — 36415 COLL VENOUS BLD VENIPUNCTURE: CPT | Performed by: INTERNAL MEDICINE

## 2022-03-29 RX ORDER — TEMAZEPAM 15 MG/1
CAPSULE ORAL
Qty: 90 CAPSULE | Refills: 3 | Status: SHIPPED | OUTPATIENT
Start: 2022-03-29 | End: 2022-10-20

## 2022-03-29 ASSESSMENT — PAIN SCALES - GENERAL: PAINLEVEL: NO PAIN (0)

## 2022-03-29 NOTE — PROGRESS NOTES
[facility]  [address] [place] [address]  Phone: [phone]  Fax: [phone]  Primary Provider: Jose Mei  Pre-op Performing Provider: JOSE MEI      PREOPERATIVE EVALUATION:  Today's date: 3/29/2022    Emili Man is a 81 year old female who presents for a preoperative evaluation.  Lives alone , son will help ,     Surgical Information:  Surgery/Procedure: hysterectomy  Surgery Location: [facility]  Surgeon: Dr. Stone  Surgery Date: 4/1/22  Time of Surgery: TBD  Where patient plans to recover: At home with family  Fax number for surgical facility:     Type of Anesthesia Anticipated: TBD    Assessment & Plan     The proposed surgical procedure is considered LOW risk.    1. Insomnia, unspecified type  She has struggled with insomnia temazepam is anything that works still causes some daytime sleepiness but without it she cannot sleep so I have told her it safe to use anxious about long-term side effects.  - temazepam (RESTORIL) 15 MG capsule; TAKE 1 CAPSULE(15 MG) BY MOUTH AT BEDTIME AS NEEDED FOR SLEEP  Dispense: 90 capsule; Refill: 3    2. Preop exam for internal medicine  Medically cleared for surgery.  Explained risks  - EKG 12-lead, tracing only  - CBC with platelets; Future  - Basic metabolic panel  (Ca, Cl, CO2, Creat, Gluc, K, Na, BUN); Future  - ABO/Rh type and screen; Future  - CBC with platelets  - Basic metabolic panel  (Ca, Cl, CO2, Creat, Gluc, K, Na, BUN)  - ABO/Rh type and screen    3. Complex ovarian cyst      Mild thrombocytopenia noted.  This is chronic but new in the calendar year.  It has not progressed there is no contraindication to surgery.  There may be mild increase in blood bleeding risk type and screening was done for in case she needs blood.  She is not on aspirin or ibuprofen.  This will be followed as an outpatient.  But also new in the last.  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    RECOMMENDATION:  APPROVAL GIVEN to proceed with proposed procedure, without further diagnostic evaluation.    Review of external notes as documented above                   Subjective     HPI related to upcoming procedure: Complex ovarian cyst that is increased in size after his annual surveillance.  She has no other symptoms    Preop Questions 3/29/2022   1. Have you ever had a heart attack or stroke? No   2. Have you ever had surgery on your heart or blood vessels, such as a stent placement, a coronary artery bypass, or surgery on an artery in your head, neck, heart, or legs? No   3. Do you have chest pain with activity? No   4. Do you have a history of  heart failure? No   5. Do you currently have a cold, bronchitis or symptoms of other infection? No   6. Do you have a cough, shortness of breath, or wheezing? No   7. Do you or anyone in your family have previous history of blood clots? No   8. Do you or does anyone in your family have a serious bleeding problem such as prolonged bleeding following surgeries or cuts? No   9. Have you ever had problems with anemia or been told to take iron pills? No   10. Have you had any abnormal blood loss such as black, tarry or bloody stools, or abnormal vaginal bleeding? No   11. Have you ever had a blood transfusion? No   12. Are you willing to have a blood transfusion if it is medically needed before, during, or after your surgery? NO -    13. Have you or any of your relatives ever had problems with anesthesia? No   14. Do you have sleep apnea, excessive snoring or daytime drowsiness? No   15. Do you have any artifical heart valves or other implanted medical devices like a pacemaker, defibrillator, or  continuous glucose monitor? No   16. Do you have artificial joints? No   17. Are you allergic to latex? No       Health Care Directive:  Patient does not have a Health Care Directive or Living Will:     Preoperative Review of :        Patient Active Problem List   Diagnosis     Meningioma (right para clinoid)      Esophageal dysfunction     Hyperlipidemia, unspecified hyperlipidemia type     Cyst of ovary, unspecified laterality     Hiatal hernia     Health maintenance examination     Drusen (degenerative) of retina     Insomnia, unspecified     Open angle with borderline findings, low risk     Viktor ulcer     Current Outpatient Medications   Medication     pravastatin (PRAVACHOL) 20 MG tablet     temazepam (RESTORIL) 15 MG capsule     No current facility-administered medications for this visit.         Review of Systems  Constitutional, neuro, ENT, endocrine, pulmonary, cardiac, gastrointestinal, genitourinary, musculoskeletal, integument and psychiatric systems are negative, except as otherwise noted.    Patient Active Problem List    Diagnosis Date Noted     Health maintenance examination 11/24/2020     Priority: Medium     mammo annual last 2019   dexa not for ten years needs to be done   Immunization flu complete , pertussis complete , needs ? penumococcal   booster and shingrix   Colon aged out          Hiatal hernia 03/22/2019     Priority: Medium     Added automatically from request for surgery [id number]         Viktor ulcer 03/22/2019     Priority: Medium     Formatting of this note might be different from the original.  Added automatically from request for surgery [id number]       Esophageal dysfunction 03/14/2019     Priority: Medium     Hyperlipidemia, unspecified hyperlipidemia type 03/14/2019     Priority: Medium     Cyst of ovary, unspecified laterality 03/14/2019     Priority: Medium     Meningioma (right para clinoid)  05/29/2015     Priority: Medium     Insomnia, unspecified 11/20/2009     Priority:  "Medium     Drusen (degenerative) of retina 02/17/2006     Priority: Medium     Open angle with borderline findings, low risk 02/17/2006     Priority: Medium      Past Medical History:   Diagnosis Date     High cholesterol      Meningioma (H) 05/2015    cyberknife irradiation     Osteoarthritis      Positive anti-CCP test      Vertigo      Past Surgical History:   Procedure Laterality Date     CATARACT EXTRACTION, BILATERAL Bilateral 2016     DILATION AND CURETTAGE  6/3/03     HERNIA REPAIR       HERNIA REPAIR  6/25/99 & 8/28/07     OTHER SURGICAL HISTORY      hemrroidectomy     TUBAL LIGATION  8/31/76     Current Outpatient Medications   Medication Sig Dispense Refill     pravastatin (PRAVACHOL) 20 MG tablet TAKE 1 TABLET(20 MG) BY MOUTH DAILY 90 tablet 1     temazepam (RESTORIL) 15 MG capsule TAKE 1 CAPSULE(15 MG) BY MOUTH AT BEDTIME AS NEEDED FOR SLEEP 90 capsule 0       No Known Allergies     Social History     Tobacco Use     Smoking status: Never Smoker     Smokeless tobacco: Never Used   Substance Use Topics     Alcohol use: Yes     Comment: Alcoholic Drinks/day: socially       History   Drug Use No         Objective     /67 (BP Location: Left arm, Patient Position: Sitting, Cuff Size: Adult Regular)   Pulse 59   Temp 97.9  F (36.6  C) (Tympanic)   Resp 14   Ht 1.575 m (5' 2\")   Wt 64.6 kg (142 lb 8 oz)   SpO2 96%   BMI 26.06 kg/m      Physical Exam    GENERAL APPEARANCE: healthy, alert and no distress     EYES: EOMI, PERRL     HENT: ear canals and TM's normal and nose and mouth without ulcers or lesions     NECK: no adenopathy, no asymmetry, masses, or scars and thyroid normal to palpation     RESP: lungs clear to auscultation - no rales, rhonchi or wheezes     CV: regular rates and rhythm, normal S1 S2, no S3 or S4 and no murmur, click or rub     ABDOMEN:  soft, nontender, no HSM or masses and bowel sounds normal     MS: extremities normal- no gross deformities noted, no evidence of inflammation " in joints, FROM in all extremities.     SKIN: no suspicious lesions or rashes     NEURO: Normal strength and tone, sensory exam grossly normal, mentation intact and speech normal     PSYCH: mentation appears normal. and affect normal/bright     LYMPHATICS: No cervical adenopathy    Recent Labs   Lab Test 07/22/21  1021 08/04/20  1338   HGB 12.3  --    *  --      --    POTASSIUM 4.3  --    CR 0.76 0.9   A1C 5.8*  --         Diagnostics:  No results found for this or any previous visit (from the past 24 hour(s)).   EKG: appears normal, NSR, normal axis, normal intervals, no acute ST/T changes c/w ischemia, no LVH by voltage criteria, unchanged from previous tracings    Revised Cardiac Risk Index (RCRI):  The patient has the following serious cardiovascular risks for perioperative complications:   - No serious cardiac risks = 0 points     RCRI Interpretation: 1 point: Class II (low risk - 0.9% complication rate)           Signed Electronically by: Leslie Desir MD  Copy of this evaluation report is provided to requesting physician.

## 2022-03-29 NOTE — LETTER
April 1, 2022      Emili Lucio  [address] [place] [address]        Dear ,    We are writing to inform you of your test results.      Resulted Orders   CBC with platelets   Result Value Ref Range    WBC Count 5.0 4.0 - 11.0 10e3/uL    RBC Count 3.94 3.80 - 5.20 10e6/uL    Hemoglobin 12.6 11.7 - 15.7 g/dL    Hematocrit 39.3 35.0 - 47.0 %     78 - 100 fL    MCH 32.0 26.5 - 33.0 pg    MCHC 32.1 31.5 - 36.5 g/dL    RDW 13.6 10.0 - 15.0 %    Platelet Count 106 (L) 150 - 450 10e3/uL   Basic metabolic panel  (Ca, Cl, CO2, Creat, Gluc, K, Na, BUN)   Result Value Ref Range    Sodium 142 136 - 145 mmol/L    Potassium 4.6 3.5 - 5.0 mmol/L    Chloride 108 (H) 98 - 107 mmol/L    Carbon Dioxide (CO2) 27 22 - 31 mmol/L    Anion Gap 7 5 - 18 mmol/L    Urea Nitrogen 20 8 - 28 mg/dL    Creatinine 1.15 (H) 0.60 - 1.10 mg/dL    Calcium 9.3 8.5 - 10.5 mg/dL    Glucose 103 70 - 125 mg/dL    GFR Estimate 47 (L) >60 mL/min/1.73m2      Comment:      Effective December 21, 2021 eGFRcr in adults is calculated using the 2021 CKD-EPI creatinine equation which includes age and gender (Arline mott al., NEJM, DOI: 10.1056/YBKBpf3420328)   Adult Type and Screen   Result Value Ref Range    ABO/RH(D) O POS     Antibody Screen Negative Negative    SPECIMEN EXPIRATION DATE [id number]      The tests are essentially stable.  The platelet count is slightly low it was low the last time we checked it.  Platelet count below 50,000 can cause bleeding problems.  There is no contraindication to the surgery with this level of platelet count.  Most causes of low platelets are autoimmune and very stable and no treatment is necessary.  We can watch this and monitor it.  I recommend returning to the clinic for a repeat check in 6 months.  Your kidney function test is also slightly elevated this is also within normal spectrum and fluctuates and should be monitored.   If you have any questions or concerns, please call the clinic at the number  listed above.       Sincerely,      Leslie Desir MD

## 2022-03-30 LAB — SARS-COV-2 RNA RESP QL NAA+PROBE: NEGATIVE

## 2022-05-02 ENCOUNTER — TRANSFERRED RECORDS (OUTPATIENT)
Dept: HEALTH INFORMATION MANAGEMENT | Facility: CLINIC | Age: 82
End: 2022-05-02
Payer: MEDICARE

## 2022-05-19 ENCOUNTER — TRANSFERRED RECORDS (OUTPATIENT)
Dept: HEALTH INFORMATION MANAGEMENT | Facility: CLINIC | Age: 82
End: 2022-05-19
Payer: MEDICARE

## 2022-07-22 DIAGNOSIS — E78.5 HYPERLIPIDEMIA: ICD-10-CM

## 2022-07-23 RX ORDER — PRAVASTATIN SODIUM 20 MG
TABLET ORAL
Qty: 90 TABLET | Refills: 1 | Status: SHIPPED | OUTPATIENT
Start: 2022-07-23 | End: 2023-02-10

## 2022-07-23 NOTE — TELEPHONE ENCOUNTER
"Due for ldl    Last Written Prescription Date:  1/26/22  Last Fill Quantity: 90,  # refills: 1   Last office visit provider:  3/29/22     Requested Prescriptions   Pending Prescriptions Disp Refills     pravastatin (PRAVACHOL) 20 MG tablet [Pharmacy Med Name: PRAVASTATIN 20MG TABLETS] 90 tablet 1     Sig: TAKE 1 TABLET(20 MG) BY MOUTH DAILY       Statins Protocol Passed - 7/22/2022  4:01 AM        Passed - LDL on file in past 12 months     Recent Labs   Lab Test 07/22/21  1021                Passed - No abnormal creatine kinase in past 12 months     No lab results found.             Passed - Recent (12 mo) or future (30 days) visit within the authorizing provider's specialty     Patient has had an office visit with the authorizing provider or a provider within the authorizing providers department within the previous 12 mos or has a future within next 30 days. See \"Patient Info\" tab in inbasket, or \"Choose Columns\" in Meds & Orders section of the refill encounter.              Passed - Medication is active on med list        Passed - Patient is age 18 or older        Passed - No active pregnancy on record        Passed - No positive pregnancy test in past 12 months             Natalia Ramos, RN 07/23/22 10:28 AM  "

## 2022-08-10 LAB
ATRIAL RATE - MUSE: 66 BPM
DIASTOLIC BLOOD PRESSURE - MUSE: NORMAL MMHG
INTERPRETATION ECG - MUSE: NORMAL
P AXIS - MUSE: 5 DEGREES
PR INTERVAL - MUSE: 160 MS
QRS DURATION - MUSE: 86 MS
QT - MUSE: 406 MS
QTC - MUSE: 425 MS
R AXIS - MUSE: 23 DEGREES
SYSTOLIC BLOOD PRESSURE - MUSE: NORMAL MMHG
T AXIS - MUSE: -6 DEGREES
VENTRICULAR RATE- MUSE: 66 BPM

## 2022-09-20 ENCOUNTER — TELEPHONE (OUTPATIENT)
Dept: INTERNAL MEDICINE | Facility: CLINIC | Age: 82
End: 2022-09-20

## 2022-09-20 NOTE — TELEPHONE ENCOUNTER
Reason for call:  Other   Patient called regarding (reason for call): appointment  Additional comments: PT needs a preop. No openings at [facility] with anyone or in the surrounding clinics. Preop: DOS: 10/05/2022: Hiatal Hernia, Dr. Garcia [facility]. PT also needs a covid test, pt was told to schedule both the preop and covid test 3-5 days beforehand. Pt would prefer to see Leslie Desir MD. Pt also states since she gets a ride to her appts, around 2 PM would be best. Please contact pt.     Phone number to reach patient:  Home number on file [phone] (home)    Best Time:  Mornings are best, 8-10    Can we leave a detailed message on this number?  YES    Travel screening: Not Applicable

## 2022-09-28 ENCOUNTER — OFFICE VISIT (OUTPATIENT)
Dept: INTERNAL MEDICINE | Facility: CLINIC | Age: 82
End: 2022-09-28
Payer: MEDICARE

## 2022-09-28 VITALS
SYSTOLIC BLOOD PRESSURE: 136 MMHG | HEIGHT: 62 IN | WEIGHT: 135.8 LBS | OXYGEN SATURATION: 98 % | HEART RATE: 72 BPM | BODY MASS INDEX: 24.99 KG/M2 | DIASTOLIC BLOOD PRESSURE: 70 MMHG

## 2022-09-28 DIAGNOSIS — D69.6 THROMBOCYTOPENIA (H): ICD-10-CM

## 2022-09-28 DIAGNOSIS — K44.9 HIATAL HERNIA: ICD-10-CM

## 2022-09-28 DIAGNOSIS — Z01.818 PRE-OPERATIVE GENERAL PHYSICAL EXAMINATION: Primary | ICD-10-CM

## 2022-09-28 LAB
ANION GAP SERPL CALCULATED.3IONS-SCNC: 12 MMOL/L (ref 7–15)
BUN SERPL-MCNC: 17.3 MG/DL (ref 8–23)
CALCIUM SERPL-MCNC: 9.1 MG/DL (ref 8.8–10.2)
CHLORIDE SERPL-SCNC: 105 MMOL/L (ref 98–107)
CREAT SERPL-MCNC: 0.87 MG/DL (ref 0.51–0.95)
DEPRECATED HCO3 PLAS-SCNC: 24 MMOL/L (ref 22–29)
ERYTHROCYTE [DISTWIDTH] IN BLOOD BY AUTOMATED COUNT: 13.7 % (ref 10–15)
GFR SERPL CREATININE-BSD FRML MDRD: 66 ML/MIN/1.73M2
GLUCOSE SERPL-MCNC: 103 MG/DL (ref 70–99)
HCT VFR BLD AUTO: 40.8 % (ref 35–47)
HGB BLD-MCNC: 13.3 G/DL (ref 11.7–15.7)
MCH RBC QN AUTO: 31.9 PG (ref 26.5–33)
MCHC RBC AUTO-ENTMCNC: 32.6 G/DL (ref 31.5–36.5)
MCV RBC AUTO: 98 FL (ref 78–100)
PLATELET # BLD AUTO: 90 10E3/UL (ref 150–450)
POTASSIUM SERPL-SCNC: 4.4 MMOL/L (ref 3.4–5.3)
RBC # BLD AUTO: 4.17 10E6/UL (ref 3.8–5.2)
SODIUM SERPL-SCNC: 141 MMOL/L (ref 136–145)
WBC # BLD AUTO: 3.8 10E3/UL (ref 4–11)

## 2022-09-28 PROCEDURE — 99214 OFFICE O/P EST MOD 30 MIN: CPT | Mod: 25 | Performed by: INTERNAL MEDICINE

## 2022-09-28 PROCEDURE — 90662 IIV NO PRSV INCREASED AG IM: CPT | Performed by: INTERNAL MEDICINE

## 2022-09-28 PROCEDURE — 80048 BASIC METABOLIC PNL TOTAL CA: CPT | Performed by: INTERNAL MEDICINE

## 2022-09-28 PROCEDURE — G0008 ADMIN INFLUENZA VIRUS VAC: HCPCS | Performed by: INTERNAL MEDICINE

## 2022-09-28 PROCEDURE — 91312 COVID-19,PF,PFIZER BOOSTER BIVALENT: CPT | Performed by: INTERNAL MEDICINE

## 2022-09-28 PROCEDURE — 36415 COLL VENOUS BLD VENIPUNCTURE: CPT | Performed by: INTERNAL MEDICINE

## 2022-09-28 PROCEDURE — 85027 COMPLETE CBC AUTOMATED: CPT | Performed by: INTERNAL MEDICINE

## 2022-09-28 PROCEDURE — 0124A COVID-19,PF,PFIZER BOOSTER BIVALENT: CPT | Performed by: INTERNAL MEDICINE

## 2022-09-28 NOTE — PROGRESS NOTES
[facility]  [address] [place] [address]  Phone: [phone]  Fax: [phone]  Primary Provider: Jose Mei  Pre-op Performing Provider: JOSE MEI      PREOPERATIVE EVALUATION:  Today's date: 9/28/2022    Emili Man is a 81 year old female who presents for a preoperative evaluation.    Surgical Information:   Surgery/Procedure: Laparoscopic paraesophageal hernia repair with Toupet fundoplication and possible BioA hiatus reinforcement.    Surgery Location: [place]   Surgeon: Dr Garcia  Surgery Date: 10/5/22  Time of Surgery: 2 pm  Where patient plans to recover: At home with family  Fax number for surgical facility: [phone]    Type of Anesthesia Anticipated: General    Assessment & Plan     The proposed surgical procedure is considered INTERMEDIATE risk.    Pre-operative general physical examination    - CBC with platelets  - Basic metabolic panel  (Ca, Cl, CO2, Creat, Gluc, K, Na, BUN)  - CBC with platelets  - Basic metabolic panel  (Ca, Cl, CO2, Creat, Gluc, K, Na, BUN)    Thrombocytopenia (H)  She has fluctuating platelet levels over the last 10 years.  She reports that 10 years ago with her prior PCP she also ran in the high 90s and 100 low 100s.  For the last few years her platelet count to be normal except the last year they have have dropped her preop 1 is 90,000.  She just had a hysterectomy with a platelet count of 100,000.  With no increased bleeding risk.  She is not on an aspirin or ibuprofen.  I do believe that the surgery is safe without the need for platelet transfusion with a platelet count above 50,000.  However given her fluctuating platelet levels and then with the additional fluctuation in her white count.  We will recommend a hematology referral for further evaluation.  She most likely has ITP but bone marrow disorder needs to be looked at    Hiatal hernia  Hiatal hernia is large but she has noticed no reflux  symptoms.  She has intermittent dysphagia.               She knows to hold her medications on the morning of surgery    RECOMMENDATION:  APPROVAL GIVEN to proceed with proposed procedure, without further diagnostic evaluation.                      Subjective     HPI related to upcoming procedure: dysphagia due to large hiatal herni       Health Care Directive:  Patient does not have a Health Care Directive or Living Will:     Preoperative Review of :        Current Outpatient Medications   Medication     pravastatin (PRAVACHOL) 20 MG tablet     temazepam (RESTORIL) 15 MG capsule     No current facility-administered medications for this visit.         Review of Systems  Constitutional, neuro, ENT, endocrine, pulmonary, cardiac, gastrointestinal, genitourinary, musculoskeletal, integument and psychiatric systems are negative, except as otherwise noted.    Patient Active Problem List    Diagnosis Date Noted     Health maintenance examination 11/24/2020     Priority: Medium     mammo annual last 2019   dexa not for ten years needs to be done   Immunization flu complete , pertussis complete , needs ? penumococcal   booster and shingrix   Colon aged out          Hiatal hernia 03/22/2019     Priority: Medium     Added automatically from request for surgery [id number]         Viktor ulcer 03/22/2019     Priority: Medium     Formatting of this note might be different from the original.  Added automatically from request for surgery [id number]       Esophageal dysfunction 03/14/2019     Priority: Medium     Hyperlipidemia, unspecified hyperlipidemia type 03/14/2019     Priority: Medium     Cyst of ovary, unspecified laterality 03/14/2019     Priority: Medium     Meningioma (right para clinoid)  05/29/2015     Priority: Medium     Insomnia, unspecified 11/20/2009     Priority: Medium     Drusen (degenerative) of retina 02/17/2006     Priority: Medium     Open angle with borderline findings, low risk 02/17/2006     Priority: Medium       Past Medical History:   Diagnosis Date     High cholesterol      Meningioma (H) 05/2015    cyberknife irradiation     Osteoarthritis      Positive anti-CCP test      Vertigo      Past Surgical History:   Procedure Laterality Date     CATARACT EXTRACTION, BILATERAL Bilateral 2016     DILATION AND CURETTAGE  6/3/03     HERNIA REPAIR       HERNIA REPAIR  6/25/99 & 8/28/07     OTHER SURGICAL HISTORY      hemrroidectomy     TUBAL LIGATION  8/31/76     Current Outpatient Medications   Medication Sig Dispense Refill     pravastatin (PRAVACHOL) 20 MG tablet TAKE 1 TABLET(20 MG) BY MOUTH DAILY 90 tablet 1     temazepam (RESTORIL) 15 MG capsule TAKE 1 CAPSULE(15 MG) BY MOUTH AT BEDTIME AS NEEDED FOR SLEEP 90 capsule 3       Allergies   Allergen Reactions     Oxycodone Other (See Comments)     Made her feel out of it        Social History     Tobacco Use     Smoking status: Never Smoker     Smokeless tobacco: Never Used   Substance Use Topics     Alcohol use: Yes     Comment: Alcoholic Drinks/day: socially       History   Drug Use No         Objective     There were no vitals taken for this visit.    Physical Exam    GENERAL APPEARANCE: healthy, alert and no distress     EYES: EOMI, PERRL     HENT: ear canals and TM's normal and nose and mouth without ulcers or lesions     NECK: no adenopathy, no asymmetry, masses, or scars and thyroid normal to palpation     RESP: lungs clear to auscultation - no rales, rhonchi or wheezes     CV: regular rates and rhythm, normal S1 S2, no S3 or S4 and no murmur, click or rub     ABDOMEN:  soft, nontender, no HSM or masses and bowel sounds normal     MS: extremities normal- no gross deformities noted, no evidence of inflammation in joints, FROM in all extremities.     SKIN: no suspicious lesions or rashes     NEURO: Normal strength and tone, sensory exam grossly normal, mentation intact and speech normal     PSYCH: mentation appears normal. and affect normal/bright     LYMPHATICS: No  cervical adenopathy    Recent Labs   Lab Test 03/29/22  1303 07/22/21  1021   HGB 12.6 12.3   * 102*    141   POTASSIUM 4.6 4.3   CR 1.15* 0.76   A1C  --  5.8*        Diagnostics:     She just had an EKG done 6 for her preop 3 months ago.    Revised Cardiac Risk Index (RCRI):  The patient has the following serious cardiovascular risks for perioperative complications:   - No serious cardiac risks = 0 points     RCRI Interpretation: 1 point: Class II (low risk - 0.9% complication rate)           Signed Electronically by: Leslie Desir MD  Copy of this evaluation report is provided to requesting physician.

## 2022-09-30 ENCOUNTER — TELEPHONE (OUTPATIENT)
Dept: INTERNAL MEDICINE | Facility: CLINIC | Age: 82
End: 2022-09-30

## 2022-09-30 DIAGNOSIS — D69.3 IDIOPATHIC THROMBOCYTOPENIC PURPURA (H): Primary | ICD-10-CM

## 2022-09-30 PROBLEM — R13.10 DYSPHAGIA: Status: ACTIVE | Noted: 2022-09-30

## 2022-09-30 PROBLEM — D69.6 THROMBOCYTOPENIA (H): Status: ACTIVE | Noted: 2022-09-30

## 2022-09-30 NOTE — TELEPHONE ENCOUNTER
Called patient about her low platelet count.  Platelet count is still above cutoff of 50.  Where spontaneous or surgical bleeding risk is low.  She tells me that she has had fluctuating platelet counts for the last 10 years.  Most likely the diagnosis is ITP.  However they have been normal for a few years as well.  White count similarly also fluctuates 2.  Recommend hematology referral.

## 2022-10-20 DIAGNOSIS — G47.00 INSOMNIA, UNSPECIFIED TYPE: ICD-10-CM

## 2022-10-20 RX ORDER — TEMAZEPAM 15 MG/1
CAPSULE ORAL
Qty: 90 CAPSULE | Refills: 3 | Status: SHIPPED | OUTPATIENT
Start: 2022-10-20 | End: 2023-05-03

## 2022-10-25 ENCOUNTER — TELEPHONE (OUTPATIENT)
Dept: INTERNAL MEDICINE | Facility: CLINIC | Age: 82
End: 2022-10-25

## 2022-10-25 NOTE — TELEPHONE ENCOUNTER
RECORDS STATUS - ALL OTHER DIAGNOSIS      RECORDS RECEIVED FROM: [facility]   DATE RECEIVED: 10/25/22   NOTES STATUS DETAILS   OFFICE NOTE from referring provider Epic 09/28/22: Dr. Leslie Desir   MEDICATION LIST [facility]    CLINICAL TRIAL TREATMENTS TO DATE     LABS     PATHOLOGY REPORTS     ANYTHING RELATED TO DIAGNOSIS Epic Most recent 09/28/22

## 2022-10-26 NOTE — TELEPHONE ENCOUNTER
Central Prior Authorization Team   Phone: [phone]    PA Initiation    Medication: Temazepam 15MG Capsules  Insurance Company: [employer] - Phone [phone] Fax [phone]  [id number] [address]  Filling Pharmacy Phone: [phone]  Filling Pharmacy Fax:    Start Date: 10/26/2022

## 2022-10-27 NOTE — TELEPHONE ENCOUNTER
Prior Authorization Approval    Authorization Effective Date: 9/26/2022  Authorization Expiration Date: 10/26/2023  Medication: Temazepam 15MG Capsules  Approved Dose/Quantity:    Reference #:     Insurance Company: [employer] - Phone [phone] Fax [phone]  Expected CoPay:       CoPay Card Available:      Foundation Assistance Needed:    Which Pharmacy is filling the prescription (Not needed for infusion/clinic administered): [facility] #[id number] [address]  Pharmacy Notified: Yes  Patient Notified: Yes

## 2022-11-02 NOTE — PROGRESS NOTES
Hematology/Medical Oncology Consultation Note      November 14, 2022    Referring provider:  Leslie Desir MD    ADDENDUM (11/21/2022):  Called patient regarding negative abdominal ultrasound and blood tests.  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  There is nothing to suggest a myelodysplastic syndrome and I would not pursue a bone marrow biopsy in this setting.    Recommend annual CBC differential and platelet count with her primary care provider.  Suggest hematology referral in event of significant change in her platelet and/or white counts.    Cristian Beltrán MD    Reason for visit:  Emili Man is an 82-year old [employer] employee from [place] referred for hematologic evaluation and consultation regarding thrombocytopenia.    Impression:  1.  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  There is nothing to suggest a myelodysplastic syndrome.    Recommendation, plan, instructions:  1.   Check ultrasound of the liver and spleen  2.   ATIYA, SSA, B12, RBC folate, serum iron/TIBC, ferritin  3.   We will call back with test results    Time with patient including review, documentation, history, examination, coordination of care and counseling was 40 minutes.    History of present illness:  Review of her blood test dating back to 2018 reveals a slowly progressive thrombocytopenia with normal platelet counts in November, 2018 and January 2020 with a 7/22/2021 platelet count of 102,000, 3/29/2022 platelet count of 106,000, and 9/28/2022 platelet count of 90,000.  Hemoglobins have been normal.  She has also been noted to have mild or borderline leukopenia.    However, review of [facility] records from 12/5/2005 discloses a WBC=3,600, Hgb=14.4, MCV=93 and platelet count=88,000 with similar findings from  11/21/2006 and 12/7/2006.    She denies history of unusual bleeding or bruising, mucositis, unexplained fever chills or sweats.  Denies dry eyes or dry mouth.  She does not smoke and uses alcohol rarely.  She denies any history of liver disease, jaundice or hepatitis.    She is s/p 10/5/2022 laparoscopic paraesophageal hernia repair with Toupet fundoplication and BioA hiatus reinforcement by Dr. Jovan Garcia.    Past medical history:  Remarkable for hiatal hernia with Viktor's ulcer, esophageal dysfunction, hyperlipidemia, ovarian cyst, 2015 right intracranial meningioma, s/p radiotherapy, osteoarthritis, hemorrhoidectomy, tubal ligation and 4/2022 total hysterectomy for benign serous cystadenofibroma.    Family history:  I have reviewed this patient's family history and updated it with pertinent information if needed.  Family History   Problem Relation Age of Onset     Diabetes Mother      Diabetes Father      Prostate Cancer Father      No Known Problems Sister      Heart Disease Brother      No Known Problems Daughter      No Known Problems Son      No Known Problems Maternal Aunt      No Known Problems Maternal Uncle      No Known Problems Paternal Aunt      No Known Problems Paternal Uncle      No Known Problems Maternal Grandmother      No Known Problems Maternal Grandfather      No Known Problems Paternal Grandmother      No Known Problems Paternal Grandfather        Medications:  Current Outpatient Medications   Medication     pravastatin (PRAVACHOL) 20 MG tablet     temazepam (RESTORIL) 15 MG capsule     No current facility-administered medications for this visit.       Allergies:  Allergies   Allergen Reactions     Oxycodone Other (See Comments)     Made her feel out of it       Review of systems:  Except as noted in the note above, the patient denies headaches, diplopia, hearing loss or dizziness; dyspnea, cough, hemoptysis, pleurisy; chest pain/pressure, palpitations, lightheadedness; anorexia,  "nausea, vomiting, abdominal pain, diarrhea, constipation, melena or rectal bleeding; dysuria, frequency,  blood in the urine; vaginal bleeding or discharge; fever, chills, sweats, hot flashes; tingling, numbness, loss of balance; insomnia, depression, anxiety.      Physical examination:  /76   Pulse 83   Resp 16   Ht 1.575 m (5' 2\")   Wt 59 kg (130 lb)   SpO2 95%   BMI 23.78 kg/m      The patient is alert and oriented. Throat and oral mucosa are normal-appearing. Thyroid gland is not enlarged. Adenopathy is absent in the neck, axilla, groin and supraclavicular fossa; Lungs are clear to auscultation and percussion without rales, wheezes or rubs; heart rhythm is regular, heart sounds are without murmurs or gallops; the abdomen is soft and flat without hepatosplenomegaly, masses, ascites or tenderness.; extremities and skin reveal no unusual skin lesions, joint swelling or edema;      Fernando Beltrán MD            "

## 2022-11-14 ENCOUNTER — LAB (OUTPATIENT)
Dept: INFUSION THERAPY | Facility: CLINIC | Age: 82
End: 2022-11-14
Attending: INTERNAL MEDICINE
Payer: MEDICARE

## 2022-11-14 ENCOUNTER — PRE VISIT (OUTPATIENT)
Dept: ONCOLOGY | Facility: CLINIC | Age: 82
End: 2022-11-14

## 2022-11-14 ENCOUNTER — ONCOLOGY VISIT (OUTPATIENT)
Dept: ONCOLOGY | Facility: CLINIC | Age: 82
End: 2022-11-14
Attending: INTERNAL MEDICINE
Payer: MEDICARE

## 2022-11-14 VITALS
HEIGHT: 62 IN | OXYGEN SATURATION: 95 % | BODY MASS INDEX: 23.92 KG/M2 | HEART RATE: 83 BPM | DIASTOLIC BLOOD PRESSURE: 76 MMHG | RESPIRATION RATE: 16 BRPM | SYSTOLIC BLOOD PRESSURE: 124 MMHG | WEIGHT: 130 LBS

## 2022-11-14 DIAGNOSIS — D72.819 LEUKOPENIA, UNSPECIFIED TYPE: Primary | ICD-10-CM

## 2022-11-14 DIAGNOSIS — R63.8 OTHER SYMPTOMS AND SIGNS CONCERNING FOOD AND FLUID INTAKE: ICD-10-CM

## 2022-11-14 DIAGNOSIS — D69.3 IDIOPATHIC THROMBOCYTOPENIC PURPURA (H): ICD-10-CM

## 2022-11-14 LAB
HCT VFR BLD AUTO: 37.6 % (ref 35–47)
IRON BINDING CAPACITY (ROCHE): 344 UG/DL (ref 240–430)
IRON SATN MFR SERPL: 26 % (ref 15–46)
IRON SERPL-MCNC: 88 UG/DL (ref 37–145)

## 2022-11-14 PROCEDURE — 82607 VITAMIN B-12: CPT | Performed by: INTERNAL MEDICINE

## 2022-11-14 PROCEDURE — 83550 IRON BINDING TEST: CPT | Performed by: INTERNAL MEDICINE

## 2022-11-14 PROCEDURE — 99203 OFFICE O/P NEW LOW 30 MIN: CPT | Performed by: INTERNAL MEDICINE

## 2022-11-14 PROCEDURE — 86235 NUCLEAR ANTIGEN ANTIBODY: CPT | Performed by: INTERNAL MEDICINE

## 2022-11-14 PROCEDURE — 82747 ASSAY OF FOLIC ACID RBC: CPT | Performed by: INTERNAL MEDICINE

## 2022-11-14 PROCEDURE — 36415 COLL VENOUS BLD VENIPUNCTURE: CPT | Performed by: INTERNAL MEDICINE

## 2022-11-14 PROCEDURE — 82728 ASSAY OF FERRITIN: CPT | Performed by: INTERNAL MEDICINE

## 2022-11-14 PROCEDURE — 85014 HEMATOCRIT: CPT | Performed by: INTERNAL MEDICINE

## 2022-11-14 PROCEDURE — 86038 ANTINUCLEAR ANTIBODIES: CPT | Performed by: INTERNAL MEDICINE

## 2022-11-14 PROCEDURE — G0463 HOSPITAL OUTPT CLINIC VISIT: HCPCS

## 2022-11-14 ASSESSMENT — PAIN SCALES - GENERAL: PAINLEVEL: NO PAIN (0)

## 2022-11-14 NOTE — PROGRESS NOTES
"Oncology Rooming Note    November 14, 2022 2:28 PM   Emili Man is a 82 year old female who presents for:    Chief Complaint   Patient presents with     Hematology     New consult thrombocytopenia      Initial Vitals: /76   Pulse 83   Resp 16   Ht 1.575 m (5' 2\")   Wt 59 kg (130 lb)   SpO2 95%   BMI 23.78 kg/m   Estimated body mass index is 23.78 kg/m  as calculated from the following:    Height as of this encounter: 1.575 m (5' 2\").    Weight as of this encounter: 59 kg (130 lb). Body surface area is 1.61 meters squared.  No Pain (0) Comment: Data Unavailable   No LMP recorded. Patient is postmenopausal.  Allergies reviewed: Yes  Medications reviewed: Yes    Medications: Medication refills not needed today.  Pharmacy [facility] into [facility]: [id number] [address]    Clinical concerns:  New consult   Evangelina Webb            " PROVIDER:[TOKEN:[[id number]],FOLLOWUP:[1-3 Days]]

## 2022-11-14 NOTE — PATIENT INSTRUCTIONS
Blood tests today  Ultrasound of abdomen to check your liver and spleen  Dr. Beltrán will call you with test results.

## 2022-11-14 NOTE — LETTER
11/14/2022         RE: Emili Man  [address] [place] [address]        Dear Colleague,    Thank you for referring your patient, Emili Man, to the [facility]. Please see a copy of my visit note below.    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    Impression:  1.  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  There is nothing to suggest a myelodysplastic syndrome.    Recommendation, plan, instructions:  1.   Check ultrasound of the liver and spleen  2.   ATIYA, SSA, B12, RBC folate, serum iron/TIBC, ferritin  3.   We will call back with test results    Time with patient including review, documentation, history, examination, coordination of care and counseling was 40 minutes.    History of present illness:  Review of her blood test dating back to 2018 reveals a slowly progressive thrombocytopenia with normal platelet counts in November, 2018 and January 2020 with a 7/22/2021 platelet count of 102,000, 3/29/2022 platelet count of 106,000, and 9/28/2022 platelet count of 90,000.  Hemoglobins have been normal.  She has also been noted to have mild or borderline leukopenia.    However, review of [facility] records from 12/5/2005 discloses a WBC=3,600, Hgb=14.4, MCV=93 and platelet count=88,000 with similar findings from 11/21/2006 and 12/7/2006.    She denies history of unusual bleeding or bruising, mucositis, unexplained fever chills or sweats.  Denies dry eyes or dry mouth.  She does not smoke and uses alcohol rarely.  She denies any history of liver disease, jaundice or hepatitis.    She is s/p 10/5/2022 laparoscopic paraesophageal hernia  repair with Toupet fundoplication and BioA hiatus reinforcement by Dr. Jovan Garcia.    Past medical history:  Remarkable for hiatal hernia with Viktor's ulcer, esophageal dysfunction, hyperlipidemia, ovarian cyst, 2015 right intracranial meningioma, s/p radiotherapy, osteoarthritis, hemorrhoidectomy, tubal ligation and 4/2022 total hysterectomy for benign serous cystadenofibroma.    Family history:  I have reviewed this patient's family history and updated it with pertinent information if needed.  Family History   Problem Relation Age of Onset     Diabetes Mother      Diabetes Father      Prostate Cancer Father      No Known Problems Sister      Heart Disease Brother      No Known Problems Daughter      No Known Problems Son      No Known Problems Maternal Aunt      No Known Problems Maternal Uncle      No Known Problems Paternal Aunt      No Known Problems Paternal Uncle      No Known Problems Maternal Grandmother      No Known Problems Maternal Grandfather      No Known Problems Paternal Grandmother      No Known Problems Paternal Grandfather        Medications:  Current Outpatient Medications   Medication     pravastatin (PRAVACHOL) 20 MG tablet     temazepam (RESTORIL) 15 MG capsule     No current facility-administered medications for this visit.       Allergies:  Allergies   Allergen Reactions     Oxycodone Other (See Comments)     Made her feel out of it       Review of systems:  Except as noted in the note above, the patient denies headaches, diplopia, hearing loss or dizziness; dyspnea, cough, hemoptysis, pleurisy; chest pain/pressure, palpitations, lightheadedness; anorexia, nausea, vomiting, abdominal pain, diarrhea, constipation, melena or rectal bleeding; dysuria, frequency,  blood in the urine; vaginal bleeding or discharge; fever, chills, sweats, hot flashes; tingling, numbness, loss of balance; insomnia, depression, anxiety.      Physical examination:  /76   Pulse 83   Resp 16   Ht  "1.575 m (5' 2\")   Wt 59 kg (130 lb)   SpO2 95%   BMI 23.78 kg/m      The patient is alert and oriented. Throat and oral mucosa are normal-appearing. Thyroid gland is not enlarged. Adenopathy is absent in the neck, axilla, groin and supraclavicular fossa; Lungs are clear to auscultation and percussion without rales, wheezes or rubs; heart rhythm is regular, heart sounds are without murmurs or gallops; the abdomen is soft and flat without hepatosplenomegaly, masses, ascites or tenderness.; extremities and skin reveal no unusual skin lesions, joint swelling or edema;      Fernando Beltrán MD              Oncology Rooming Note    November 14, 2022 2:28 PM   Emili Man is a 82 year old female who presents for:    Chief Complaint   Patient presents with     Hematology     New consult thrombocytopenia      Initial Vitals: /76   Pulse 83   Resp 16   Ht 1.575 m (5' 2\")   Wt 59 kg (130 lb)   SpO2 95%   BMI 23.78 kg/m   Estimated body mass index is 23.78 kg/m  as calculated from the following:    Height as of this encounter: 1.575 m (5' 2\").    Weight as of this encounter: 59 kg (130 lb). Body surface area is 1.61 meters squared.  No Pain (0) Comment: Data Unavailable   No LMP recorded. Patient is postmenopausal.  Allergies reviewed: Yes  Medications reviewed: Yes    Medications: Medication refills not needed today.  Pharmacy name entered into [facility]: [facility] [address] [place] [address]    Clinical concerns:  New consult   Evangelina Webb                Again, thank you for allowing me to participate in the care of your patient.        Sincerely,        Fernando Beltrán MD    "

## 2022-11-15 ENCOUNTER — PATIENT OUTREACH (OUTPATIENT)
Dept: ONCOLOGY | Facility: CLINIC | Age: 82
End: 2022-11-15

## 2022-11-15 LAB
FERRITIN SERPL-MCNC: 88 NG/ML (ref 11–328)
VIT B12 SERPL-MCNC: 646 PG/ML (ref 232–1245)

## 2022-11-15 NOTE — PROGRESS NOTES
[facility]: Cancer Care Short Note                                    Discussion with Patient:                                                      Patient called and left message stating she was seen in Cancer Care on the 14th and just reading after visit summary now.  She said she has some questions on labs and imaging ordered, specifically Vit B12 ordered. She said this is not mentioned, nor did she get any. She asked for call back to [phone].    Assessment:                                                      Wished patient a Happy Birthday yesterday.    Intervention/Education provided during outreach:                                                       Called patient.  She was wondering what Vit B12 is listed on her after visit summary from yesterday. Told her this is a lab Dr. Beltrán is checking.  She thought it was a oral medication she needed to take.  Told her he wanted to check her Vit B12 level, along with some other labs that are still pending.  Told her it can take a few days to get these results and Dr. Beltrán will call her when everything is back. Patient verbalized understanding and expressed appreciation for call back.    Follow up call in 1-2 weeks from Dr. Beltrán.    Signature:  Nilsa Rapp RN

## 2022-11-16 LAB
ANA PAT SER IF-IMP: ABNORMAL
ANA SER QL IF: POSITIVE
ANA TITR SER IF: ABNORMAL {TITER}
ENA SS-A AB SER IA-ACNC: 0.5 U/ML
ENA SS-A AB SER IA-ACNC: NEGATIVE
FOLATE RBC-MCNC: 609 NG/ML

## 2022-11-21 ENCOUNTER — HOSPITAL ENCOUNTER (OUTPATIENT)
Dept: ULTRASOUND IMAGING | Facility: HOSPITAL | Age: 82
Discharge: HOME | End: 2022-11-21
Attending: INTERNAL MEDICINE | Admitting: INTERNAL MEDICINE
Payer: MEDICARE

## 2022-11-21 DIAGNOSIS — D69.3 IDIOPATHIC THROMBOCYTOPENIC PURPURA (H): ICD-10-CM

## 2022-11-21 DIAGNOSIS — D72.819 LEUKOPENIA, UNSPECIFIED TYPE: ICD-10-CM

## 2022-11-21 PROCEDURE — 76700 US EXAM ABDOM COMPLETE: CPT

## 2023-04-28 DIAGNOSIS — D32.9 MENINGIOMA (H): Primary | ICD-10-CM

## 2023-05-03 ENCOUNTER — TELEPHONE (OUTPATIENT)
Dept: NURSING | Facility: CLINIC | Age: 83
End: 2023-05-03
Payer: MEDICARE

## 2023-05-03 DIAGNOSIS — G47.00 INSOMNIA, UNSPECIFIED TYPE: ICD-10-CM

## 2023-05-03 RX ORDER — TEMAZEPAM 15 MG/1
CAPSULE ORAL
Qty: 90 CAPSULE | Refills: 3 | Status: SHIPPED | OUTPATIENT
Start: 2023-05-03 | End: 2023-12-04

## 2023-06-28 ENCOUNTER — HOSPITAL ENCOUNTER (OUTPATIENT)
Dept: MRI IMAGING | Facility: HOSPITAL | Age: 83
Discharge: HOME | End: 2023-06-28
Attending: RADIOLOGY | Admitting: RADIOLOGY
Payer: MEDICARE

## 2023-06-28 DIAGNOSIS — D32.9 MENINGIOMA (H): ICD-10-CM

## 2023-06-28 PROCEDURE — G1010 CDSM STANSON: HCPCS

## 2023-06-28 PROCEDURE — 70553 MRI BRAIN STEM W/O & W/DYE: CPT | Mod: MG

## 2023-06-28 PROCEDURE — A9585 GADOBUTROL INJECTION: HCPCS | Mod: JZ | Performed by: RADIOLOGY

## 2023-06-28 PROCEDURE — 255N000002 HC RX 255 OP 636: Mod: JZ | Performed by: RADIOLOGY

## 2023-06-28 RX ORDER — GADOBUTROL 604.72 MG/ML
6 INJECTION INTRAVENOUS ONCE
Status: COMPLETED | OUTPATIENT
Start: 2023-06-28 | End: 2023-06-28

## 2023-06-28 RX ADMIN — GADOBUTROL 6 ML: 604.72 INJECTION INTRAVENOUS at 15:39

## 2023-07-17 NOTE — PROGRESS NOTES
[facility] Radiation Oncology Follow Up     Patient: Emili Man  MRN: [id number]  Date of Service: 07/18/2023       DISEASE TREATED:  right para clinoid meningioma       TYPE OF RADIATION THERAPY ADMINISTERED:  SFT 2500cGy/5 fractions      INTERVAL SINCE COMPLETION OF RADIATION THERAPY: 7/2015 so 8 years since completion       SUBJECTIVE:  Ms. Man is a 82 year old female who was treated with Cyberknife stereotactic radiosurgery for a meningioma located in right prerclinoid region 2.5x1.8x2.2 cm adjacent to chiasm, right optic nerve, encasing regional arteries and invasive locally precluding surgical intervention.      Since I last saw her she had surgery for her hiatal hernia and is doing ok but having trouble with gas. She noted some dizziness recently but in setting of starting some sleep medications.      Medications were reviewed and are up to date on EPIC.    The following portions of the patient's history were reviewed and updated as appropriate: allergies, current medications, past family history, past medical history, past social history, past surgical history and problem list.    Review of Systems:      General  Constitutional  Constitutional (WDL): Exceptions to WDL  Fatigue: Fatigue relieved by rest  EENT  Eye Disorders  Eye Disorder (WDL): All eye disorder elements are within defined limits  Ear Disorders  Ear Disorder (WDL): Exceptions to WDL  Tinnitus: Mild symptoms OR intervention not indicated (whooshing sound, comes and goes)  Respiratory  Respiratory  Respiratory (WDL): All respiratory elements are within defined limits  Cardiovascular  Cardiovascular  Cardiovascular (WDL): All cardiovascular elements are within defined limits  Gastrointestinal  Gastrointestinal  Gastrointestinal (WDL): All gastrointestinal elements are within defined limits  Musculoskeletal  Musculoskeletal and Connective Tissue Disorders  Musculoskeletal & Connective (WDL): All musculoskeletal & connective elements  are within defined limits  Integumentary  Integumentary  Integumentary (WDL): All integumentary elements are within defined limits  Neurological  Neurosensory  Neurosensory (WDL): Exceptions to WDL  Dizziness: Mild unsteadiness or sensation of movement  Genitourinary/Reproductive  Genitourinary  Genitourinary (WDL): All genitourinary elements are within defined limits  Lymphatic  Lymph System Disorders  Lymph (WDL): All lymph elements are within defined limits  Pain  Pain Score: No Pain (0)  AUA Assessment                                                              Accompanied by  Accompanied By: self only    Objective:          PHYSICAL EXAMINATION:    BP (!) 148/79 (BP Location: Left arm, Patient Position: Sitting)   Pulse 67   Temp 97.9  F (36.6  C)   Resp 16   Wt 62.6 kg (138 lb)   SpO2 97%   BMI 25.24 kg/m      Gen: Alert, in NAD  Eyes: PER sclera anicteric  HENT: NC/AT  Neck: Supple  Pulm: No increased work of breathing, speaks in complete sentences  CV: Well-perfused, no cyanosis   Musculoskeletal: No MSK defects noted.  Skin: Normal color and turgor  Neurologic: Nonfocal  Psychiatric: Appropriate mood and affect     Imaging: Imaging results 6 weeks:MRI Brain w & w/o contrast    Result Date: 6/29/2023  EXAM: MR BRAIN W/O and W CONTRAST LOCATION: [facility] DATE: 6/28/2023 INDICATION: Paraclinoid meningioma s p XRT 2015. COMPARISON: Brain MRI 8/5/2021, 8/4/2022 reviewed. CONTRAST: 6 mL Gadavist TECHNIQUE: Routine multiplanar multisequence head MRI without and with intravenous contrast. FINDINGS: INTRACRANIAL CONTENTS: There is no evidence for acute or subacute infarction. Nothing for restricted diffusion abnormality is evident intracranially. Redemonstration of extra-axial homogeneous enhancing dural based mass at the right anterior paraclinoid level without evidence for significant internal hemorrhage or mineralization. This mass is most compatible with meningioma as before and  measures approximately 22 mm SI x 23 mm ML x 23 mm AP, effectively unchanged from prior evaluations, with the 1-2 mm differences in measurements due to slice selection/technique. This mass again has been shown to encase but not occlude or significantly narrow the right cavernous and supraclinoid ICA and proximal right M1 series 1001 images 113-118 and series 4 image 111. There is some mass effect as before upon the adjacent parenchyma of the inferior right frontal lobe and hypothalamic level and mesial right temporal lobe without significant edema. No other differences. No acute or chronic hemorrhage. No extra-axial  blood products or fluid collections are noted. Corpus callosum is normal. A couple foci of nonspecific T2/FLAIR hyperintensity within the white matter, including deep white matter right frontal lobe, are stable and remain of doubtful clinical significance and are within the range of expected for a patient of this age. 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 Benign perivascular spaces versus chronic lacunar-type infarctions bilateral subinsular and basal ganglia level. Signal intensity of the brain parenchyma is otherwise normal. Moderate generalized cerebral atrophy. No hydrocephalus. Stable mild deformity of the inferior aspect of the frontal horn of the right lateral ventricle as before due to the mass. Normal position of the cerebellar tonsils. No additional pathologic enhancement. Dural venous sinus, left Meckel's cave and cavernous sinus enhancement otherwise satisfactory. SELLA: No abnormality accounting for technique. OSSEOUS STRUCTURES/SOFT TISSUES: Normal marrow signal. The major intracranial vascular flow voids are maintained. ORBITS: Prior bilateral cataract surgery. Visualized portions of the orbits are otherwise unremarkable. No pathologic orbital enhancement. SINUSES/MASTOIDS: Mucosal thickening primarily involving the ethmoid air cells. Scattered fluid/membrane  thickening in the mastoid air cells bilaterally.     IMPRESSION: 1.  Redemonstration of stable extra-axial homogeneous enhancing dural based right paraclinoid mass most compatible with stable meningioma, unchanged dating back to 8/5/2021 and 8/4/2022. Stable degree of localized mass effect without adjacent edema. Stable encasement without narrowing or occlusion of the right cavernous and supraclinoid ICA/right M1 segment. No evidence for internal degeneration/hemorrhage or significant mineralization of this paraclinoid mass lesion. 2.  No other significant changes from prior study. Stable mild deformation due to mass effect of the frontal horn of the right lateral ventricle with no midline shift. 3.  Stable mild chronic ischemic change deep white matter of both cerebral hemispheres including deep white matter right frontal lobe, bilateral subinsular/basal ganglia level, with stable chronic infarction left cerebellar hemisphere. No recent acute or  subacute ischemic changes are noted. 4.  Additional details and full description are given above.       Impression   (D32.9) Meningioma (right para clinoid)   (primary encounter diagnosis)          Assessment & Plan:   83 yo with stable right para clinoid meningioma over 8 years.    No intracranial reason for intermittent dizziness. No other associated neurologic symptoms. Recommend noting timing of symptoms with sleep medication and talking with primary care.     MRI in two years unless symptoms dictate otherwise.           Total time of this visit 40 min  including time spent face-to-face with patient and or via video/audio, and also in preparing for today's visit for MDM and documentation. Medical decision-making included consideration and possible diagnoses, management options, complex record review, review of diagnostic tests and information, consideration and discussion of significant complications based on comorbidities, discussion with providers involved in the  care of the patient.      Damaris Hoang MD  Department of Radiation Oncology   [facility]  Tel: [phone]  Page: [phone]    [facility]  [address] [place] [address]     [address]   [place] [address]    CC:  Patient Care Team:  Leslie Desir MD as PCP - General (Internal Medicine)  Leslie Desir MD as Assigned PCP  Lia Ortega MD as MD (Hematology)  Fernando Beltrán MD as MD (Hematology)

## 2023-07-18 ENCOUNTER — OFFICE VISIT (OUTPATIENT)
Dept: RADIATION ONCOLOGY | Facility: HOSPITAL | Age: 83
End: 2023-07-18
Attending: RADIOLOGY
Payer: MEDICARE

## 2023-07-18 VITALS
DIASTOLIC BLOOD PRESSURE: 79 MMHG | WEIGHT: 138 LBS | TEMPERATURE: 97.9 F | OXYGEN SATURATION: 97 % | BODY MASS INDEX: 25.24 KG/M2 | SYSTOLIC BLOOD PRESSURE: 148 MMHG | HEART RATE: 67 BPM | RESPIRATION RATE: 16 BRPM

## 2023-07-18 DIAGNOSIS — D32.9 MENINGIOMA (H): Primary | ICD-10-CM

## 2023-07-18 PROCEDURE — 99215 OFFICE O/P EST HI 40 MIN: CPT | Performed by: RADIOLOGY

## 2023-07-18 PROCEDURE — G0463 HOSPITAL OUTPT CLINIC VISIT: HCPCS | Performed by: RADIOLOGY

## 2023-07-18 ASSESSMENT — PAIN SCALES - GENERAL: PAINLEVEL: NO PAIN (0)

## 2023-07-18 NOTE — LETTER
7/18/2023         RE: Emili Man  [address] [place] [address]        Dear Colleague,    Thank you for referring your patient, Emili Man, to the [facility] RADIATION ONCOLOGY [place]. Please see a copy of my visit note below.    [facility] Radiation Oncology Follow Up     Patient: Emili Man  MRN: [id number]  Date of Service: 07/18/2023       DISEASE TREATED:  right para clinoid meningioma       TYPE OF RADIATION THERAPY ADMINISTERED:  SFT 2500cGy/5 fractions      INTERVAL SINCE COMPLETION OF RADIATION THERAPY: 7/2015 so 8 years since completion       SUBJECTIVE:  Ms. Man is a 82 year old female who was treated with Cyberknife stereotactic radiosurgery for a meningioma located in right prerclinoid region 2.5x1.8x2.2 cm adjacent to chiasm, right optic nerve, encasing regional arteries and invasive locally precluding surgical intervention.      Since I last saw her she had surgery for her hiatal hernia and is doing ok but having trouble with gas. She noted some dizziness recently but in setting of starting some sleep medications.      Medications were reviewed and are up to date on EPIC.    The following portions of the patient's history were reviewed and updated as appropriate: allergies, current medications, past family history, past medical history, past social history, past surgical history and problem list.    Review of Systems:      General  Constitutional  Constitutional (WDL): Exceptions to WDL  Fatigue: Fatigue relieved by rest  EENT  Eye Disorders  Eye Disorder (WDL): All eye disorder elements are within defined limits  Ear Disorders  Ear Disorder (WDL): Exceptions to WDL  Tinnitus: Mild symptoms OR intervention not indicated (whooshing sound, comes and goes)  Respiratory  Respiratory  Respiratory (WDL): All respiratory elements are within defined limits  Cardiovascular  Cardiovascular  Cardiovascular (WDL): All cardiovascular elements are within defined  limits  Gastrointestinal  Gastrointestinal  Gastrointestinal (WDL): All gastrointestinal elements are within defined limits  Musculoskeletal  Musculoskeletal and Connective Tissue Disorders  Musculoskeletal & Connective (WDL): All musculoskeletal & connective elements are within defined limits  Integumentary  Integumentary  Integumentary (WDL): All integumentary elements are within defined limits  Neurological  Neurosensory  Neurosensory (WDL): Exceptions to WDL  Dizziness: Mild unsteadiness or sensation of movement  Genitourinary/Reproductive  Genitourinary  Genitourinary (WDL): All genitourinary elements are within defined limits  Lymphatic  Lymph System Disorders  Lymph (WDL): All lymph elements are within defined limits  Pain  Pain Score: No Pain (0)  AUA Assessment                                                              Accompanied by  Accompanied By: self only    Objective:          PHYSICAL EXAMINATION:    BP (!) 148/79 (BP Location: Left arm, Patient Position: Sitting)   Pulse 67   Temp 97.9  F (36.6  C)   Resp 16   Wt 62.6 kg (138 lb)   SpO2 97%   BMI 25.24 kg/m      Gen: Alert, in NAD  Eyes: PER sclera anicteric  HENT: NC/AT  Neck: Supple  Pulm: No increased work of breathing, speaks in complete sentences  CV: Well-perfused, no cyanosis   Musculoskeletal: No MSK defects noted.  Skin: Normal color and turgor  Neurologic: Nonfocal  Psychiatric: Appropriate mood and affect     Imaging: Imaging results 6 weeks:MRI Brain w & w/o contrast    Result Date: 6/29/2023  EXAM: MR BRAIN W/O and W CONTRAST LOCATION: [facility] DATE: 6/28/2023 INDICATION: Paraclinoid meningioma s p XRT 2015. COMPARISON: Brain MRI 8/5/2021, 8/4/2022 reviewed. CONTRAST: 6 mL Gadavist TECHNIQUE: Routine multiplanar multisequence head MRI without and with intravenous contrast. FINDINGS: INTRACRANIAL CONTENTS: There is no evidence for acute or subacute infarction. Nothing for restricted diffusion  abnormality is evident intracranially. Redemonstration of extra-axial homogeneous enhancing dural based mass at the right anterior paraclinoid level without evidence for significant internal hemorrhage or mineralization. This mass is most compatible with meningioma as before and measures approximately 22 mm SI x 23 mm ML x 23 mm AP, effectively unchanged from prior evaluations, with the 1-2 mm differences in measurements due to slice selection/technique. This mass again has been shown to encase but not occlude or significantly narrow the right cavernous and supraclinoid ICA and proximal right M1 series 1001 images 113-118 and series 4 image 111. There is some mass effect as before upon the adjacent parenchyma of the inferior right frontal lobe and hypothalamic level and mesial right temporal lobe without significant edema. No other differences. No acute or chronic hemorrhage. No extra-axial  blood products or fluid collections are noted. Corpus callosum is normal. A couple foci of nonspecific T2/FLAIR hyperintensity within the white matter, including deep white matter right frontal lobe, are stable and remain of doubtful clinical significance and are within the range of expected for a patient of this age. 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 Benign perivascular spaces versus chronic lacunar-type infarctions bilateral subinsular and basal ganglia level. Signal intensity of the brain parenchyma is otherwise normal. Moderate generalized cerebral atrophy. No hydrocephalus. Stable mild deformity of the inferior aspect of the frontal horn of the right lateral ventricle as before due to the mass. Normal position of the cerebellar tonsils. No additional pathologic enhancement. Dural venous sinus, left Meckel's cave and cavernous sinus enhancement otherwise satisfactory. SELLA: No abnormality accounting for technique. OSSEOUS STRUCTURES/SOFT TISSUES: Normal marrow signal. The major intracranial  vascular flow voids are maintained. ORBITS: Prior bilateral cataract surgery. Visualized portions of the orbits are otherwise unremarkable. No pathologic orbital enhancement. SINUSES/MASTOIDS: Mucosal thickening primarily involving the ethmoid air cells. Scattered fluid/membrane thickening in the mastoid air cells bilaterally.     IMPRESSION: 1.  Redemonstration of stable extra-axial homogeneous enhancing dural based right paraclinoid mass most compatible with stable meningioma, unchanged dating back to 8/5/2021 and 8/4/2022. Stable degree of localized mass effect without adjacent edema. Stable encasement without narrowing or occlusion of the right cavernous and supraclinoid ICA/right M1 segment. No evidence for internal degeneration/hemorrhage or significant mineralization of this paraclinoid mass lesion. 2.  No other significant changes from prior study. Stable mild deformation due to mass effect of the frontal horn of the right lateral ventricle with no midline shift. 3.  Stable mild chronic ischemic change deep white matter of both cerebral hemispheres including deep white matter right frontal lobe, bilateral subinsular/basal ganglia level, with stable chronic infarction left cerebellar hemisphere. No recent acute or  subacute ischemic changes are noted. 4.  Additional details and full description are given above.       Impression   (D32.9) Meningioma (right para clinoid)   (primary encounter diagnosis)          Assessment & Plan:   81 yo with stable right para clinoid meningioma over 8 years.    No intracranial reason for intermittent dizziness. No other associated neurologic symptoms. Recommend noting timing of symptoms with sleep medication and talking with primary care.     MRI in two years unless symptoms dictate otherwise.           Total time of this visit 40 min  including time spent face-to-face with patient and or via video/audio, and also in preparing for today's visit for MDM and documentation.  "Medical decision-making included consideration and possible diagnoses, management options, complex record review, review of diagnostic tests and information, consideration and discussion of significant complications based on comorbidities, discussion with providers involved in the care of the patient.      Damaris Hoang MD  Department of Radiation Oncology   [facility]  Tel: [phone]  Page: [phone]    [facility]  [address] [place] [address]     [facility]   [address] [facility] [address]  [place] [address]    CC:  Patient Care Team:  Leslie Desir MD as PCP - General (Internal Medicine)  Leslie Desir MD as Assigned PCP  Lia Ortega MD as MD (Hematology)  Fernando Beltrán MD as MD (Hematology)    Oncology Rooming Note    July 18, 2023 2:08 PM   Emili Man is a 82 year old female who presents for:    Chief Complaint   Patient presents with     Oncology Clinic Visit     Follow up     Initial Vitals: BP (!) 148/79 (BP Location: Left arm, Patient Position: Sitting)   Pulse 67   Temp 97.9  F (36.6  C)   Resp 16   Wt 62.6 kg (138 lb)   SpO2 97%   BMI 25.24 kg/m   Estimated body mass index is 25.24 kg/m  as calculated from the following:    Height as of 11/14/22: 1.575 m (5' 2\").    Weight as of this encounter: 62.6 kg (138 lb). Body surface area is 1.65 meters squared.  No Pain (0) Comment: Data Unavailable   No LMP recorded. Patient is postmenopausal.  Allergies reviewed: Yes  Medications reviewed: Yes    Medications: Medication refills not needed today.  Pharm[facility] into [employer]: [id number] [address] AT [facility]    Clinical concerns: Follow up, MRI done, awaiting results Dr. Hoang was notified.      Chrystal Sanchez RN                Again, thank you for allowing me to participate in the care of your patient.        Sincerely,        Damaris Hoang MD    "

## 2023-07-18 NOTE — PROGRESS NOTES
"Oncology Rooming Note    July 18, 2023 2:08 PM   Emili Man is a 82 year old female who presents for:    Chief Complaint   Patient presents with     Oncology Clinic Visit     Follow up     Initial Vitals: BP (!) 148/79 (BP Location: Left arm, Patient Position: Sitting)   Pulse 67   Temp 97.9  F (36.6  C)   Resp 16   Wt 62.6 kg (138 lb)   SpO2 97%   BMI 25.24 kg/m   Estimated body mass index is 25.24 kg/m  as calculated from the following:    Height as of 11/14/22: 1.575 m (5' 2\").    Weight as of this encounter: 62.6 kg (138 lb). Body surface area is 1.65 meters squared.  No Pain (0) Comment: Data Unavailable   No LMP recorded. Patient is postmenopausal.  Allergies reviewed: Yes  Medications reviewed: Yes    Medications: Medication refills not needed today.  Pharm[facility] into [employer]: [id number] [address]    Clinical concerns: Follow up, MRI done, awaiting results Dr. Hoang was notified.      Chrystal Sanchez RN            "

## 2023-10-11 ENCOUNTER — OFFICE VISIT (OUTPATIENT)
Dept: INTERNAL MEDICINE | Facility: CLINIC | Age: 83
End: 2023-10-11
Payer: MEDICARE

## 2023-10-11 VITALS
BODY MASS INDEX: 25.91 KG/M2 | RESPIRATION RATE: 16 BRPM | TEMPERATURE: 97.8 F | HEART RATE: 63 BPM | SYSTOLIC BLOOD PRESSURE: 132 MMHG | HEIGHT: 62 IN | WEIGHT: 140.8 LBS | OXYGEN SATURATION: 99 % | DIASTOLIC BLOOD PRESSURE: 78 MMHG

## 2023-10-11 DIAGNOSIS — K25.9 CAMERON ULCER, UNSPECIFIED ULCER CHRONICITY: ICD-10-CM

## 2023-10-11 DIAGNOSIS — Z00.00 HEALTH MAINTENANCE EXAMINATION: ICD-10-CM

## 2023-10-11 DIAGNOSIS — D69.3 IDIOPATHIC THROMBOCYTOPENIC PURPURA (H): ICD-10-CM

## 2023-10-11 DIAGNOSIS — K44.9 HIATAL HERNIA: ICD-10-CM

## 2023-10-11 DIAGNOSIS — F51.01 PRIMARY INSOMNIA: ICD-10-CM

## 2023-10-11 DIAGNOSIS — H02.409 PTOSIS OF EYELID, UNSPECIFIED LATERALITY: Primary | ICD-10-CM

## 2023-10-11 DIAGNOSIS — E78.5 HYPERLIPIDEMIA, UNSPECIFIED HYPERLIPIDEMIA TYPE: ICD-10-CM

## 2023-10-11 DIAGNOSIS — D32.9 MENINGIOMA (H): ICD-10-CM

## 2023-10-11 LAB
ALBUMIN SERPL BCG-MCNC: 4.4 G/DL (ref 3.5–5.2)
ALP SERPL-CCNC: 83 U/L (ref 35–104)
ALT SERPL W P-5'-P-CCNC: 14 U/L (ref 0–50)
ANION GAP SERPL CALCULATED.3IONS-SCNC: 10 MMOL/L (ref 7–15)
AST SERPL W P-5'-P-CCNC: 27 U/L (ref 0–45)
BILIRUB SERPL-MCNC: 0.4 MG/DL
BUN SERPL-MCNC: 15 MG/DL (ref 8–23)
CALCIUM SERPL-MCNC: 9.2 MG/DL (ref 8.8–10.2)
CHLORIDE SERPL-SCNC: 106 MMOL/L (ref 98–107)
CHOLEST SERPL-MCNC: 156 MG/DL
CREAT SERPL-MCNC: 0.97 MG/DL (ref 0.51–0.95)
DEPRECATED HCO3 PLAS-SCNC: 26 MMOL/L (ref 22–29)
EGFRCR SERPLBLD CKD-EPI 2021: 58 ML/MIN/1.73M2
ERYTHROCYTE [DISTWIDTH] IN BLOOD BY AUTOMATED COUNT: 13.3 % (ref 10–15)
GLUCOSE SERPL-MCNC: 104 MG/DL (ref 70–99)
HBA1C MFR BLD: 5.6 % (ref 0–5.6)
HCT VFR BLD AUTO: 40.6 % (ref 35–47)
HDLC SERPL-MCNC: 37 MG/DL
HGB BLD-MCNC: 13.5 G/DL (ref 11.7–15.7)
LDLC SERPL CALC-MCNC: 93 MG/DL
MCH RBC QN AUTO: 31.9 PG (ref 26.5–33)
MCHC RBC AUTO-ENTMCNC: 33.3 G/DL (ref 31.5–36.5)
MCV RBC AUTO: 96 FL (ref 78–100)
NONHDLC SERPL-MCNC: 119 MG/DL
PLATELET # BLD AUTO: 75 10E3/UL (ref 150–450)
POTASSIUM SERPL-SCNC: 4.4 MMOL/L (ref 3.4–5.3)
PROT SERPL-MCNC: 7.2 G/DL (ref 6.4–8.3)
RBC # BLD AUTO: 4.23 10E6/UL (ref 3.8–5.2)
SODIUM SERPL-SCNC: 142 MMOL/L (ref 135–145)
TRIGL SERPL-MCNC: 130 MG/DL
TSH SERPL DL<=0.005 MIU/L-ACNC: 1.96 UIU/ML (ref 0.3–4.2)
VIT D+METAB SERPL-MCNC: 29 NG/ML (ref 20–50)
WBC # BLD AUTO: 3.8 10E3/UL (ref 4–11)

## 2023-10-11 PROCEDURE — 80061 LIPID PANEL: CPT | Performed by: INTERNAL MEDICINE

## 2023-10-11 PROCEDURE — 99214 OFFICE O/P EST MOD 30 MIN: CPT | Mod: 25 | Performed by: INTERNAL MEDICINE

## 2023-10-11 PROCEDURE — 36415 COLL VENOUS BLD VENIPUNCTURE: CPT | Performed by: INTERNAL MEDICINE

## 2023-10-11 PROCEDURE — 90480 ADMN SARSCOV2 VAC 1/ONLY CMP: CPT | Performed by: INTERNAL MEDICINE

## 2023-10-11 PROCEDURE — G0439 PPPS, SUBSEQ VISIT: HCPCS | Performed by: INTERNAL MEDICINE

## 2023-10-11 PROCEDURE — 91320 SARSCV2 VAC 30MCG TRS-SUC IM: CPT | Performed by: INTERNAL MEDICINE

## 2023-10-11 PROCEDURE — 82306 VITAMIN D 25 HYDROXY: CPT | Mod: GZ | Performed by: INTERNAL MEDICINE

## 2023-10-11 PROCEDURE — 85027 COMPLETE CBC AUTOMATED: CPT | Performed by: INTERNAL MEDICINE

## 2023-10-11 PROCEDURE — 80053 COMPREHEN METABOLIC PANEL: CPT | Performed by: INTERNAL MEDICINE

## 2023-10-11 PROCEDURE — 83036 HEMOGLOBIN GLYCOSYLATED A1C: CPT | Performed by: INTERNAL MEDICINE

## 2023-10-11 PROCEDURE — 84443 ASSAY THYROID STIM HORMONE: CPT | Performed by: INTERNAL MEDICINE

## 2023-10-11 PROCEDURE — G0008 ADMIN INFLUENZA VIRUS VAC: HCPCS | Performed by: INTERNAL MEDICINE

## 2023-10-11 PROCEDURE — 90662 IIV NO PRSV INCREASED AG IM: CPT | Performed by: INTERNAL MEDICINE

## 2023-10-11 RX ORDER — ESZOPICLONE 2 MG/1
2 TABLET, FILM COATED ORAL AT BEDTIME
Qty: 30 TABLET | Refills: 1 | Status: SHIPPED | OUTPATIENT
Start: 2023-10-11 | End: 2024-05-06 | Stop reason: ALTCHOICE

## 2023-10-11 ASSESSMENT — ENCOUNTER SYMPTOMS
HEADACHES: 0
HEMATURIA: 0
DYSURIA: 0
JOINT SWELLING: 0
DIARRHEA: 0
DIZZINESS: 1
WEAKNESS: 0
CONSTIPATION: 0
SORE THROAT: 0
ABDOMINAL PAIN: 0
BREAST MASS: 0
NERVOUS/ANXIOUS: 0
HEMATOCHEZIA: 0
COUGH: 0
FREQUENCY: 0
FEVER: 0
CHILLS: 0
NAUSEA: 0
PALPITATIONS: 0
PARESTHESIAS: 0
MYALGIAS: 0
ARTHRALGIAS: 0
SHORTNESS OF BREATH: 0
HEARTBURN: 0
EYE PAIN: 0

## 2023-10-11 ASSESSMENT — ACTIVITIES OF DAILY LIVING (ADL): CURRENT_FUNCTION: NO ASSISTANCE NEEDED

## 2023-10-11 NOTE — PATIENT INSTRUCTIONS
Nothing by mouth on morning of surgery   Bone density scan is due next year   Please get your second shingles shot it is not too late to get it    RSV can be done at a later date and is optional   Calcium 1000mg a day in suppelments  ( you can use TUMS as a calcium source )   Vitamin d 2000units a day

## 2023-10-11 NOTE — PROGRESS NOTES
[facility]  [address] [place] [address]  Phone: [phone]  Fax: [phone]  Primary Provider: Leslie Mei  Pre-op Performing Provider: LESLIE MEI      PREOPERATIVE EVALUATION:  Today's date: 10/11/2023    Emili is a 82 year old female who presents for a preoperative evaluation.      10/11/2023     2:51 PM   Additional Questions   Roomed by Celestine HAMEED RN       Surgical Information:  Surgery/Procedure: blepharoplasty   Surgery Location: [facility]   Surgeon: Dr [facility]  eye    Surgery Date: October 13th   Time of Surgery:   Where patient plans to recover: At home with family  Fax number for surgical facility: [phone]    Assessment & Plan     The proposed surgical procedure is considered LOW risk.    Ptosis of eyelid, unspecified laterality      Idiopathic thrombocytopenic purpura (H)      Primary insomnia    - eszopiclone (LUNESTA) 2 MG tablet; Take 1 tablet (2 mg) by mouth at bedtime    Health maintenance examination      Hiatal hernia      Meningioma (right para clinoid)       Viktor ulcer, unspecified ulcer chronicity      Hyperlipidemia, unspecified hyperlipidemia type    - Lipid panel reflex to direct LDL Fasting; Future  - Comprehensive metabolic panel; Future  - TSH; Future  - Hemoglobin A1c; Future  - Vitamin D Deficiency; Future  - CBC with platelets; Future  - Lipid panel reflex to direct LDL Fasting  - Comprehensive metabolic panel  - TSH  - Hemoglobin A1c  - Vitamin D Deficiency  - CBC with platelets           - No identified additional risk factors other than previously addressed    Antiplatelet or Anticoagulation Medication Instructions:   - Patient is on no antiplatelet or anticoagulation medications.    Additional Medication Instructions:  Patient is to take all scheduled medications on the day of surgery    RECOMMENDATION:  APPROVAL GIVEN to proceed with proposed procedure, without further diagnostic  evaluation.            Subjective       HPI related to upcoming procedure: bilateral ptosis           10/11/2023    2:19 PM   Preop Questions   1. Have you ever had a heart attack or stroke? No   2. Have you ever had surgery on your heart or blood vessels, such as a stent placement, a coronary artery bypass, or surgery on an artery in your head, neck, heart, or legs? No   3. Do you have chest pain with activity? No   4. Do you have a history of  heart failure? No   5. Do you currently have a cold, bronchitis or symptoms of other infection? No   6. Do you have a cough, shortness of breath, or wheezing? No   7. Do you or anyone in your family have previous history of blood clots? No   8. Do you or does anyone in your family have a serious bleeding problem such as prolonged bleeding following surgeries or cuts? No   9. Have you ever had problems with anemia or been told to take iron pills? No   10. Have you had any abnormal blood loss such as black, tarry or bloody stools, or abnormal vaginal bleeding? No   11. Have you ever had a blood transfusion? No   12. Are you willing to have a blood transfusion if it is medically needed before, during, or after your surgery? Yes   13. Have you or any of your relatives ever had problems with anesthesia? No   14. Do you have sleep apnea, excessive snoring or daytime drowsiness? No   15. Do you have any artifical heart valves or other implanted medical devices like a pacemaker, defibrillator, or continuous glucose monitor? No   16. Do you have artificial joints? No   17. Are you allergic to latex? No     Health Care Directive:  Patient does not have a Health Care Directive or Living Will: Advance Directive received and scanned. Click on Code in the patient header to view.    Preoperative Review of :            Review of Systems  CONSTITUTIONAL: NEGATIVE for fever, chills, change in weight  INTEGUMENTARY/SKIN: NEGATIVE for worrisome rashes, moles or lesions  EYES: NEGATIVE for  vision changes or irritation  ENT/MOUTH: NEGATIVE for ear, mouth and throat problems  RESP: NEGATIVE for significant cough or SOB  CV: NEGATIVE for chest pain, palpitations or peripheral edema  GI: NEGATIVE for nausea, abdominal pain, heartburn, or change in bowel habits  : NEGATIVE for frequency, dysuria, or hematuria  MUSCULOSKELETAL: NEGATIVE for significant arthralgias or myalgia  NEURO: NEGATIVE for weakness, dizziness or paresthesias  ENDOCRINE: NEGATIVE for temperature intolerance, skin/hair changes  HEME: NEGATIVE for bleeding problems  PSYCHIATRIC: NEGATIVE for changes in mood or affect    Patient Active Problem List    Diagnosis Date Noted    Thrombocytopenia (H24) 09/30/2022     Priority: High    Dysphagia 09/30/2022     Priority: Medium    Health maintenance examination 11/24/2020     Priority: Medium     mammo annual last 2019   dexa not for ten years needs to be done   Immunization flu complete , pertussis complete , needs ? penumococcal   booster and shingrix   Colon aged out         Hiatal hernia 03/22/2019     Priority: Medium     Added automatically from request for surgery [id number]        Viktor ulcer 03/22/2019     Priority: Medium     Formatting of this note might be different from the original.  Added automatically from request for surgery [id number]      Esophageal dysfunction 03/14/2019     Priority: Medium    Hyperlipidemia, unspecified hyperlipidemia type 03/14/2019     Priority: Medium    Cyst of ovary, unspecified laterality 03/14/2019     Priority: Medium    Meningioma (right para clinoid)  05/29/2015     Priority: Medium    Insomnia, unspecified 11/20/2009     Priority: Medium    Drusen (degenerative) of retina 02/17/2006     Priority: Medium    Open angle with borderline findings, low risk 02/17/2006     Priority: Medium      Past Medical History:   Diagnosis Date    High cholesterol     Meningioma (H) 05/2015    cyberknife irradiation    Osteoarthritis     Positive anti-CCP test      "Vertigo      Past Surgical History:   Procedure Laterality Date    CATARACT EXTRACTION, BILATERAL Bilateral 2016    DILATION AND CURETTAGE  6/3/03    HERNIA REPAIR      HERNIA REPAIR  6/25/99 & 8/28/07    OTHER SURGICAL HISTORY      hemrroidectomy    TUBAL LIGATION  8/31/76     Current Outpatient Medications   Medication Sig Dispense Refill    pravastatin (PRAVACHOL) 20 MG tablet Take 1 tablet (20 mg) by mouth daily 90 tablet 3    temazepam (RESTORIL) 15 MG capsule TAKE 1 CAPSULE(15 MG) BY MOUTH AT BEDTIME AS NEEDED FOR SLEEP 90 capsule 3       Allergies   Allergen Reactions    Oxycodone Other (See Comments)     Made her feel out of it        Social History     Tobacco Use    Smoking status: Never    Smokeless tobacco: Never   Substance Use Topics    Alcohol use: Yes     Comment: Alcoholic Drinks/day: socially       History   Drug Use No         Objective     /78 (BP Location: Left arm, Cuff Size: Adult Regular)   Pulse 63   Temp 97.8  F (36.6  C) (Tympanic)   Resp 16   Ht 1.575 m (5' 2\")   Wt 63.9 kg (140 lb 12.8 oz)   SpO2 99%   BMI 25.75 kg/m      Physical Exam    GENERAL APPEARANCE: healthy, alert and no distress     EYES: EOMI, PERRL     HENT: ear canals and TM's normal and nose and mouth without ulcers or lesions     NECK: no adenopathy, no asymmetry, masses, or scars and thyroid normal to palpation     RESP: lungs clear to auscultation - no rales, rhonchi or wheezes     CV: regular rates and rhythm, normal S1 S2, no S3 or S4 and no murmur, click or rub     ABDOMEN:  soft, nontender, no HSM or masses and bowel sounds normal     MS: extremities normal- no gross deformities noted, no evidence of inflammation in joints, FROM in all extremities.     SKIN: no suspicious lesions or rashes     NEURO: Normal strength and tone, sensory exam grossly normal, mentation intact and speech normal     PSYCH: mentation appears normal. and affect normal/bright     LYMPHATICS: No cervical adenopathy    Recent Labs " "  Lab Test 09/28/22  1511 03/29/22  1303   HGB 13.3 12.6   PLT 90* 106*    142   POTASSIUM 4.4 4.6   CR 0.87 1.15*        Diagnostics:  Labs pending at this time.  Results will be reviewed when available.   No EKG required for low risk surgery (cataract, skin procedure, breast biopsy, etc).    Revised Cardiac Risk Index (RCRI):  The patient has the following serious cardiovascular risks for perioperative complications:   - No serious cardiac risks = 0 points     RCRI Interpretation: 1 point: Class II (low risk - 0.9% complication rate)         Signed Electronically by: Leslie Desir MD  Copy of this evaluation report is provided to requesting physician.      Answers submitted by the patient for this visit:  Annual Preventive Visit (Submitted on 10/11/2023)  Chief Complaint: Annual Exam:  In general, how would you rate your overall physical health?: good  Frequency of exercise:: 4-5 days/week  Do you usually eat at least 4 servings of fruit and vegetables a day, include whole grains & fiber, and avoid regularly eating high fat or \"junk\" foods? : No  Taking medications regularly:: Yes  Medication side effects:: None  Activities of Daily Living: no assistance needed  Home safety: no safety concerns identified  Hearing Impairment:: no hearing concerns  In the past 6 months, have you been bothered by leaking of urine?: No  In general, how would you rate your overall mental or emotional health?: good  Additional concerns today:: No  Exercise outside of work (Submitted on 10/11/2023)  Chief Complaint: Annual Exam:  Duration of exercise:: 45-60 minutes    "

## 2023-10-11 NOTE — PROGRESS NOTES
"SUBJECTIVE:   Emili is a 82 year old who presents for Preventive Visit.      10/11/2023     2:51 PM   Additional Questions   Roomed by Celestine HAMEED RN       Are you in the first 12 months of your Medicare coverage?  No    Healthy Habits:     In general, how would you rate your overall health?  Good    Frequency of exercise:  4-5 days/week    Duration of exercise:  45-60 minutes    Do you usually eat at least 4 servings of fruit and vegetables a day, include whole grains    & fiber and avoid regularly eating high fat or \"junk\" foods?  No    Taking medications regularly:  Yes    Medication side effects:  None    Ability to successfully perform activities of daily living:  No assistance needed    Home Safety:  No safety concerns identified    Hearing Impairment:  No hearing concerns    In the past 6 months, have you been bothered by leaking of urine?  No    In general, how would you rate your overall mental or emotional health?  Good    Additional concerns today:  No      Today's PHQ-2 Score:       10/11/2023     2:40 PM   PHQ-2 ( 1999 Pf[employer])   Q1: Little interest or pleasure in doing things 0   Q2: Feeling down, depressed or hopeless 0   PHQ-2 Score 0   Q1: Little interest or pleasure in doing things Not at all   Q2: Feeling down, depressed or hopeless Not at all   PHQ-2 Score 0           Have you ever done Advance Care Planning? (For example, a Health Directive, POLST, or a discussion with a medical provider or your loved ones about your wishes): Yes, patient states has an Advance Care Planning document and will bring a copy to the clinic.       Fall risk  Fallen 2 or more times in the past year?: No  Any fall with injury in the past year?: No    Cognitive Screening   1) Repeat 3 items (Leader, Season, Table)    2) Clock draw: NORMAL  3) 3 item recall: Recalls 2 objects   Results:  Normal Clock, recalled 2 words without prompting    Mini-CogTM Copyright S Estelita. Licensed by the author for use in [employer] " Services; reprinted with permission (soob@[facility]). All rights reserved.          Reviewed and updated as needed this visit by clinical staff   Tobacco  Allergies  Meds              Reviewed and updated as needed this visit by Provider                 Social History     Tobacco Use    Smoking status: Never    Smokeless tobacco: Never   Substance Use Topics    Alcohol use: Yes     Comment: Alcoholic Drinks/day: socially             10/11/2023     2:39 PM   Alcohol Use   Prescreen: >3 drinks/day or >7 drinks/week? No     Do you have a current opioid prescription? No  Do you use any other controlled substances or medications that are not prescribed by a provider? None              Current providers sharing in care for this patient include:   Patient Care Team:  Leslie Desir MD as PCP - General (Internal Medicine)  Leslie Desir MD as Assigned PCP  Lia Ortega MD as MD (Hematology)  Fernando Beltrán MD as MD (Hematology)  Damaris Hoang MD as Assigned Cancer Care Provider  Damaris Hoang MD as MD (Radiation Oncology)    The following health maintenance items are reviewed in Epic and correct as of today:  Health Maintenance   Topic Date Due    RSV VACCINE 60+ (1 - 1-dose 60+ series) Never done    MEDICARE ANNUAL WELLNESS VISIT  07/22/2022    ZOSTER IMMUNIZATION (3 of 3) 08/04/2022    DTAP/TDAP/TD IMMUNIZATION (2 - Td or Tdap) 12/20/2022    COVID-19 Vaccine (5 - Pfizer series) 01/28/2023    INFLUENZA VACCINE (1) 09/01/2023    ANNUAL REVIEW OF HM ORDERS  09/28/2023    FALL RISK ASSESSMENT  10/11/2024    ADVANCE CARE PLANNING  07/24/2026    DEXA  08/03/2036    PHQ-2 (once per calendar year)  Completed    Pneumococcal Vaccine: 65+ Years  Completed    IPV IMMUNIZATION  Aged Out    HPV IMMUNIZATION  Aged Out    MENINGITIS IMMUNIZATION  Aged Out             Pertinent mammograms are reviewed under the imaging tab.    Review of Systems   Constitutional:  Negative for chills and fever.   HENT:   "Negative for congestion, ear pain, hearing loss and sore throat.    Eyes:  Negative for pain and visual disturbance.   Respiratory:  Negative for cough and shortness of breath.    Cardiovascular:  Negative for chest pain, palpitations and peripheral edema.   Gastrointestinal:  Negative for abdominal pain, constipation, diarrhea, heartburn, hematochezia and nausea.   Breasts:  Negative for tenderness, breast mass and discharge.   Genitourinary:  Negative for dysuria, frequency, genital sores, hematuria, pelvic pain, urgency, vaginal bleeding and vaginal discharge.   Musculoskeletal:  Negative for arthralgias, joint swelling and myalgias.   Skin:  Negative for rash.   Neurological:  Positive for dizziness. Negative for weakness, headaches and paresthesias.   Psychiatric/Behavioral:  Negative for mood changes. The patient is not nervous/anxious.          OBJECTIVE:   /78 (BP Location: Left arm, Cuff Size: Adult Regular)   Pulse 63   Temp 97.8  F (36.6  C) (Tympanic)   Resp 16   Ht 1.575 m (5' 2\")   Wt 63.9 kg (140 lb 12.8 oz)   SpO2 99%   BMI 25.75 kg/m   Estimated body mass index is 25.75 kg/m  as calculated from the following:    Height as of this encounter: 1.575 m (5' 2\").    Weight as of this encounter: 63.9 kg (140 lb 12.8 oz).  Physical Exam  GENERAL: healthy, alert and no distress  NECK: no adenopathy, no asymmetry, masses, or scars and thyroid normal to palpation  RESP: lungs clear to auscultation - no rales, rhonchi or wheezes  CV: regular rate and rhythm, normal S1 S2, no S3 or S4, no murmur, click or rub, no peripheral edema and peripheral pulses strong  ABDOMEN: soft, nontender, no hepatosplenomegaly, no masses and bowel sounds normal  MS: no gross musculoskeletal defects noted, no edema        ASSESSMENT / PLAN:   (H02.409) Ptosis of eyelid, unspecified laterality  (primary encounter diagnosis)  Comment:   Plan:     (D69.3) Idiopathic thrombocytopenic purpura (H)  Comment:   Plan: labs today " "      (F51.01) Primary insomnia  Comment:   Plan: eszopiclone (LUNESTA) 2 MG tablet        Tried and failed several meds including mirtazapine , caused too much daytime grogginess , teamzzepam not as effective as it used to be . Trila of lunesta     (Z00.00) Health maintenance examination  Comment:   Plan: dexa due next year    fLu and covid due   Other vaccines discussed . She didn't get the second shingles shot and should get that     (K44.9) Hiatal hernia  Comment:   Plan:     (D32.9) Meningioma (right para clinoid)   Comment:   Plan: stable on MRI     (K25.9) Viktor ulcer, unspecified ulcer chronicity  Comment:   Plan:     (E78.5) Hyperlipidemia, unspecified hyperlipidemia type  Comment:   Plan: Lipid panel reflex to direct LDL Fasting,         Comprehensive metabolic panel, TSH, Hemoglobin         A1c, Vitamin D Deficiency, CBC with platelets                  COUNSELING:  Reviewed preventive health counseling, as reflected in patient instructions      BMI:   Estimated body mass index is 25.75 kg/m  as calculated from the following:    Height as of this encounter: 1.575 m (5' 2\").    Weight as of this encounter: 63.9 kg (140 lb 12.8 oz).         She reports that she has never smoked. She has never used smokeless tobacco.      Appropriate preventive services were discussed with this patient, including applicable screening as appropriate for fall prevention, nutrition, physical activity, Tobacco-use cessation, weight loss and cognition.  Checklist reviewing preventive services available has been given to the patient.    Reviewed patients plan of care and provided an AVS. The Basic Care Plan (routine screening as documented in Health Maintenance) for mEili meets the Care Plan requirement. This Care Plan has been established and reviewed with the Patient.          Leslie Desir MD  [facility]    Identified Health Risks:  I have reviewed Opioid Use Disorder and Substance Use " Disorder risk factors and made any needed referrals.

## 2023-10-11 NOTE — LETTER
October 17, 2023      Emili Lucio  [address] [place] [address]        Dear ,    We are writing to inform you of your test results.        Resulted Orders   Lipid panel reflex to direct LDL Fasting   Result Value Ref Range    Cholesterol 156 <200 mg/dL    Triglycerides 130 <150 mg/dL    Direct Measure HDL 37 (L) >=50 mg/dL    LDL Cholesterol Calculated 93 <=100 mg/dL    Non HDL Cholesterol 119 <130 mg/dL    Narrative    Cholesterol  Desirable:  <200 mg/dL    Triglycerides  Normal:  Less than 150 mg/dL  Borderline High:  150-199 mg/dL  High:  200-499 mg/dL  Very High:  Greater than or equal to 500 mg/dL    Direct Measure HDL  Female:  Greater than or equal to 50 mg/dL   Male:  Greater than or equal to 40 mg/dL    LDL Cholesterol  Desirable:  <100mg/dL  Above Desirable:  100-129 mg/dL   Borderline High:  130-159 mg/dL   High:  160-189 mg/dL   Very High:  >= 190 mg/dL    Non HDL Cholesterol  Desirable:  130 mg/dL  Above Desirable:  130-159 mg/dL  Borderline High:  160-189 mg/dL  High:  190-219 mg/dL  Very High:  Greater than or equal to 220 mg/dL   Comprehensive metabolic panel   Result Value Ref Range    Sodium 142 135 - 145 mmol/L      Comment:      Reference intervals for this test were updated on 09/26/2023 to more accurately reflect our healthy population. There may be differences in the flagging of prior results with similar values performed with this method. Interpretation of those prior results can be made in the context of the updated reference intervals.     Potassium 4.4 3.4 - 5.3 mmol/L    Carbon Dioxide (CO2) 26 22 - 29 mmol/L    Anion Gap 10 7 - 15 mmol/L    Urea Nitrogen 15.0 8.0 - 23.0 mg/dL    Creatinine 0.97 (H) 0.51 - 0.95 mg/dL    GFR Estimate 58 (L) >60 mL/min/1.73m2    Calcium 9.2 8.8 - 10.2 mg/dL    Chloride 106 98 - 107 mmol/L    Glucose 104 (H) 70 - 99 mg/dL    Alkaline Phosphatase 83 35 - 104 U/L    AST 27 0 - 45 U/L      Comment:      Reference intervals for this test were  updated on 6/12/2023 to more accurately reflect our healthy population. There may be differences in the flagging of prior results with similar values performed with this method. Interpretation of those prior results can be made in the context of the updated reference intervals.    ALT 14 0 - 50 U/L      Comment:      Reference intervals for this test were updated on 6/12/2023 to more accurately reflect our healthy population. There may be differences in the flagging of prior results with similar values performed with this method. Interpretation of those prior results can be made in the context of the updated reference intervals.      Protein Total 7.2 6.4 - 8.3 g/dL    Albumin 4.4 3.5 - 5.2 g/dL    Bilirubin Total 0.4 <=1.2 mg/dL   TSH   Result Value Ref Range    TSH 1.96 0.30 - 4.20 uIU/mL   Hemoglobin A1c   Result Value Ref Range    Hemoglobin A1C 5.6 0.0 - 5.6 %      Comment:      Normal <5.7%   Prediabetes 5.7-6.4%    Diabetes 6.5% or higher     Note: Adopted from ADA consensus guidelines.   Vitamin D Deficiency   Result Value Ref Range    Vitamin D, Total (25-Hydroxy) 29 20 - 50 ng/mL      Comment:      optimum levels    Narrative    Season, race, dietary intake, and treatment affect the concentration of 25-hydroxy-Vitamin D. Values may decrease during winter months and increase during summer months.    Vitamin D determination is routinely performed by an immunoassay specific for 25 hydroxyvitamin D3.  If an individual is on vitamin D2(ergocalciferol) supplementation, please specify 25 OH vitamin D2 and D3 level determination by LCMSMS test VITD23.     CBC with platelets   Result Value Ref Range    WBC Count 3.8 (L) 4.0 - 11.0 10e3/uL    RBC Count 4.23 3.80 - 5.20 10e6/uL    Hemoglobin 13.5 11.7 - 15.7 g/dL    Hematocrit 40.6 35.0 - 47.0 %    MCV 96 78 - 100 fL    MCH 31.9 26.5 - 33.0 pg    MCHC 33.3 31.5 - 36.5 g/dL    RDW 13.3 10.0 - 15.0 %    Platelet Count 75 (L) 150 - 450 10e3/uL     Your tests are overall  quite good.  Your cholesterol shows a low LDL or bad cholesterol.  Your HDL or good cholesterol is also a little bit low that has always been the case.  Overall the pravastatin is going to help you so you must continue it.  Your thyroid test is normal vitamin D is low normal and your diabetic test is normal.  Your electrolytes and liver tests are normal.  The kidney test is slightly abnormal.  This is quite normal with aging and is nothing to be concerned about.    Component      Latest Ref Rng 7/22/2021  10:21 AM 3/29/2022  1:03 PM 9/28/2022  3:11 PM 10/11/2023  3:42 PM   WBC      4.0 - 11.0 10e3/uL 3.3 (L)  5.0  3.8 (L)  3.8 (L)    RBC Count      3.80 - 5.20 10e6/uL 3.86  3.94  4.17  4.23    Hemoglobin      11.7 - 15.7 g/dL 12.3  12.6  13.3  13.5    Hematocrit      35.0 - 47.0 % 38.3  39.3  40.8  40.6    MCV      78 - 100 fL 99  100  98  96    MCH      26.5 - 33.0 pg 31.9  32.0  31.9  31.9    MCHC      31.5 - 36.5 g/dL 32.1  32.1  32.6  33.3    RDW      10.0 - 15.0 % 13.5  13.6  13.7  13.3    Platelet Count      150 - 450 10e3/uL 102 (L)  106 (L)  90 (L)  75 (L)       Legend:  (L) Low    The blood counts show a persistent low white count and slightly worse low platelet counts.  It is still above 50,000 but I would like you to repeat your platelet count in 6 months instead of waiting a full year to make sure it does not dip down any further.  You can make a lab only appointment in 6 months .If you have any questions or concerns, please call the clinic at the number listed above.       Sincerely,      Leslie Desir MD

## 2023-10-12 ENCOUNTER — TELEPHONE (OUTPATIENT)
Dept: INTERNAL MEDICINE | Facility: CLINIC | Age: 83
End: 2023-10-12
Payer: MEDICARE

## 2023-10-12 NOTE — TELEPHONE ENCOUNTER
Prior Authorization Request    Prior Authorization for the medication temazepam (RESTORIL) 15 MG capsule         Requesting Provider: Leslie Desir   Pharmacy name and location: [facility]        Pt name: Emili Man        Pt : 1940        Pt MRN: [id number]        Last Office Visit: 10/12/2023           Insurance: Payor: MEDICARE / Plan: MEDICARE PT B ONLY / Product Type: Medicare /         Insurance ID Number: [unfilled]        Prior Auth Contact Phone number:     BIN#:   GABRIELLAN#:         CMM KEY: [id number]    Informed patient that prior authorizations can take up to 10 business days  for response NA    OLD PA IS EXPIRING SOON

## 2023-10-16 NOTE — TELEPHONE ENCOUNTER
Central Prior Authorization Team   Phone: [phone]    PA Initiation    Medication: temazepam (RESTORIL) 15 MG capsule   old PA will  soon  Insurance Company: [employer] Non-Specialty PA's - Phone [phone] Fax [phone]  Pharmacy Filling the Rx: [id number] [address] & [facility]  Filling Pharmacy Phone: [phone]  Filling Pharmacy Fax:    Start Date: 10/16/2023

## 2023-10-17 NOTE — TELEPHONE ENCOUNTER
Prior Authorization Approval    Authorization Effective Date: 2023  Authorization Expiration Date: 11/15/2023  Medication: temazepam (RESTORIL) 15 MG capsule   old PA will  soon  Reference #:     Insurance Company: [employer] Non-Specialty PA's - Phone [phone] Fax [phone]  Which Pharmacy is filling the prescription (Not needed for infusion/clinic administered): [id number] [address]  Pharmacy Notified: Yes  Patient Notified: Instructed pharmacy to notify patient when script is ready to /ship.

## 2023-11-21 DIAGNOSIS — G47.00 INSOMNIA, UNSPECIFIED TYPE: ICD-10-CM

## 2023-11-21 RX ORDER — TEMAZEPAM 15 MG/1
CAPSULE ORAL
Qty: 90 CAPSULE | OUTPATIENT
Start: 2023-11-21

## 2023-12-01 DIAGNOSIS — G47.00 INSOMNIA, UNSPECIFIED TYPE: ICD-10-CM

## 2023-12-04 RX ORDER — TEMAZEPAM 15 MG/1
CAPSULE ORAL
Qty: 90 CAPSULE | Refills: 3 | Status: SHIPPED | OUTPATIENT
Start: 2023-12-04 | End: 2024-09-06

## 2023-12-06 DIAGNOSIS — G47.00 INSOMNIA, UNSPECIFIED TYPE: ICD-10-CM

## 2023-12-06 NOTE — TELEPHONE ENCOUNTER
So this has happened in the past , but temapzeam was covered before and pt has been taking it for years  And has helped patient , do we need to do a PA

## 2023-12-08 RX ORDER — TEMAZEPAM 15 MG/1
CAPSULE ORAL
Qty: 90 CAPSULE | Refills: 3 | OUTPATIENT
Start: 2023-12-08

## 2023-12-13 NOTE — TELEPHONE ENCOUNTER
Pt called about Temazepam and [facility] stated a PA  is needed for her to have the cheaper medication. Lorazepam is 50 dollars..

## 2023-12-14 ENCOUNTER — TELEPHONE (OUTPATIENT)
Dept: INTERNAL MEDICINE | Facility: CLINIC | Age: 83
End: 2023-12-14
Payer: MEDICARE

## 2023-12-14 NOTE — TELEPHONE ENCOUNTER
Prior Authorization Retail Medication Request    Medication/Dose: temazepam (RESTORIL) 15 MG capsule  Diagnosis and ICD code (if different than what is on RX):  See Chart  New/renewal/insurance change PA/secondary ins. PA:  Previously Tried and Failed:  See Chart  Rationale:  See Chart    Insurance   Primary: See Chart  Insurance ID:  See Chart    Secondary (if applicable):See Chart  Insurance ID:  See Chart    Pharmacy Information (if different than what is on RX)  Name:     Phone:     Fax:

## 2024-01-29 DIAGNOSIS — E78.5 HYPERLIPIDEMIA: ICD-10-CM

## 2024-01-29 RX ORDER — PRAVASTATIN SODIUM 20 MG
20 TABLET ORAL DAILY
Qty: 90 TABLET | Refills: 1 | Status: SHIPPED | OUTPATIENT
Start: 2024-01-29 | End: 2024-07-29

## 2024-04-03 ENCOUNTER — LAB (OUTPATIENT)
Dept: LAB | Facility: CLINIC | Age: 84
End: 2024-04-03
Payer: MEDICARE

## 2024-04-03 DIAGNOSIS — D69.3 IDIOPATHIC THROMBOCYTOPENIC PURPURA (H): ICD-10-CM

## 2024-04-03 LAB
ERYTHROCYTE [DISTWIDTH] IN BLOOD BY AUTOMATED COUNT: 13.4 % (ref 10–15)
HCT VFR BLD AUTO: 41 % (ref 35–47)
HGB BLD-MCNC: 13.3 G/DL (ref 11.7–15.7)
MCH RBC QN AUTO: 31.3 PG (ref 26.5–33)
MCHC RBC AUTO-ENTMCNC: 32.4 G/DL (ref 31.5–36.5)
MCV RBC AUTO: 97 FL (ref 78–100)
PLATELET # BLD AUTO: 92 10E3/UL (ref 150–450)
RBC # BLD AUTO: 4.25 10E6/UL (ref 3.8–5.2)
WBC # BLD AUTO: 5.3 10E3/UL (ref 4–11)

## 2024-04-03 PROCEDURE — 85027 COMPLETE CBC AUTOMATED: CPT

## 2024-04-03 PROCEDURE — 36415 COLL VENOUS BLD VENIPUNCTURE: CPT

## 2024-05-06 ENCOUNTER — NURSE TRIAGE (OUTPATIENT)
Dept: NURSING | Facility: CLINIC | Age: 84
End: 2024-05-06

## 2024-05-06 ENCOUNTER — OFFICE VISIT (OUTPATIENT)
Dept: FAMILY MEDICINE | Facility: CLINIC | Age: 84
End: 2024-05-06
Payer: MEDICARE

## 2024-05-06 VITALS
DIASTOLIC BLOOD PRESSURE: 81 MMHG | TEMPERATURE: 97.4 F | HEART RATE: 77 BPM | RESPIRATION RATE: 21 BRPM | BODY MASS INDEX: 27 KG/M2 | SYSTOLIC BLOOD PRESSURE: 136 MMHG | OXYGEN SATURATION: 96 % | WEIGHT: 143 LBS | HEIGHT: 61 IN

## 2024-05-06 DIAGNOSIS — Z29.11 NEED FOR VACCINATION AGAINST RESPIRATORY SYNCYTIAL VIRUS: ICD-10-CM

## 2024-05-06 DIAGNOSIS — Z23 NEED FOR TDAP VACCINATION: ICD-10-CM

## 2024-05-06 DIAGNOSIS — D69.3 IDIOPATHIC THROMBOCYTOPENIC PURPURA (H): ICD-10-CM

## 2024-05-06 DIAGNOSIS — D69.6 THROMBOCYTOPENIA (H): ICD-10-CM

## 2024-05-06 DIAGNOSIS — D32.9 MENINGIOMA (H): ICD-10-CM

## 2024-05-06 DIAGNOSIS — F51.01 PRIMARY INSOMNIA: Primary | ICD-10-CM

## 2024-05-06 DIAGNOSIS — H81.10 BENIGN PAROXYSMAL POSITIONAL VERTIGO, UNSPECIFIED LATERALITY: ICD-10-CM

## 2024-05-06 PROBLEM — Z00.00 HEALTH MAINTENANCE EXAMINATION: Status: RESOLVED | Noted: 2020-11-24 | Resolved: 2024-05-06

## 2024-05-06 PROBLEM — R97.1 ELEVATED CANCER ANTIGEN 125 (CA-125): Status: ACTIVE | Noted: 2024-05-06

## 2024-05-06 PROBLEM — R19.00 PELVIC MASS: Status: ACTIVE | Noted: 2024-05-06

## 2024-05-06 LAB
ALBUMIN UR-MCNC: 30 MG/DL
APPEARANCE UR: ABNORMAL
BACTERIA #/AREA URNS HPF: ABNORMAL /HPF
BILIRUB UR QL STRIP: NEGATIVE
COLOR UR AUTO: YELLOW
ERYTHROCYTE [DISTWIDTH] IN BLOOD BY AUTOMATED COUNT: 13.6 % (ref 10–15)
GLUCOSE UR STRIP-MCNC: NEGATIVE MG/DL
HCT VFR BLD AUTO: 41.5 % (ref 35–47)
HGB BLD-MCNC: 13.5 G/DL (ref 11.7–15.7)
HGB UR QL STRIP: ABNORMAL
HYALINE CASTS #/AREA URNS LPF: ABNORMAL /LPF
KETONES UR STRIP-MCNC: NEGATIVE MG/DL
LEUKOCYTE ESTERASE UR QL STRIP: ABNORMAL
MCH RBC QN AUTO: 31.5 PG (ref 26.5–33)
MCHC RBC AUTO-ENTMCNC: 32.5 G/DL (ref 31.5–36.5)
MCV RBC AUTO: 97 FL (ref 78–100)
NITRATE UR QL: NEGATIVE
PH UR STRIP: 6 [PH] (ref 5–8)
PLATELET # BLD AUTO: 75 10E3/UL (ref 150–450)
RBC # BLD AUTO: 4.29 10E6/UL (ref 3.8–5.2)
RBC #/AREA URNS AUTO: ABNORMAL /HPF
SP GR UR STRIP: 1.02 (ref 1–1.03)
SQUAMOUS #/AREA URNS AUTO: ABNORMAL /LPF
TRANS CELLS #/AREA URNS HPF: ABNORMAL /HPF
UROBILINOGEN UR STRIP-ACNC: 0.2 E.U./DL
WBC # BLD AUTO: 5.4 10E3/UL (ref 4–11)
WBC #/AREA URNS AUTO: ABNORMAL /HPF

## 2024-05-06 PROCEDURE — 99214 OFFICE O/P EST MOD 30 MIN: CPT | Performed by: FAMILY MEDICINE

## 2024-05-06 PROCEDURE — 81001 URINALYSIS AUTO W/SCOPE: CPT | Performed by: FAMILY MEDICINE

## 2024-05-06 PROCEDURE — 80053 COMPREHEN METABOLIC PANEL: CPT | Performed by: FAMILY MEDICINE

## 2024-05-06 PROCEDURE — 36415 COLL VENOUS BLD VENIPUNCTURE: CPT | Performed by: FAMILY MEDICINE

## 2024-05-06 PROCEDURE — 85027 COMPLETE CBC AUTOMATED: CPT | Performed by: FAMILY MEDICINE

## 2024-05-06 RX ORDER — RESPIRATORY SYNCYTIAL VIRUS VACCINE 120MCG/0.5
0.5 KIT INTRAMUSCULAR ONCE
Qty: 1 EACH | Refills: 0 | Status: SHIPPED | OUTPATIENT
Start: 2024-05-06 | End: 2024-05-06

## 2024-05-06 RX ORDER — MECLIZINE HYDROCHLORIDE 25 MG/1
25 TABLET ORAL 3 TIMES DAILY PRN
Qty: 30 TABLET | Refills: 1 | Status: SHIPPED | OUTPATIENT
Start: 2024-05-06

## 2024-05-06 ASSESSMENT — PATIENT HEALTH QUESTIONNAIRE - PHQ9
SUM OF ALL RESPONSES TO PHQ QUESTIONS 1-9: 5
10. IF YOU CHECKED OFF ANY PROBLEMS, HOW DIFFICULT HAVE THESE PROBLEMS MADE IT FOR YOU TO DO YOUR WORK, TAKE CARE OF THINGS AT HOME, OR GET ALONG WITH OTHER PEOPLE: VERY DIFFICULT
SUM OF ALL RESPONSES TO PHQ QUESTIONS 1-9: 5

## 2024-05-06 ASSESSMENT — ENCOUNTER SYMPTOMS: NAUSEA: 1

## 2024-05-06 NOTE — PROGRESS NOTES
"  Assessment & Plan     Thrombocytopenia (H24)  Stable- asymptomatic today     Meningioma (H)  Consider mri this spring if ongoing dizziness    Idiopathic thrombocytopenic purpura (H)  Stable labs     Primary insomnia  Chronic long standing- certainly may contribute to her vertigo/dizziness.  At her age would recommend follow-up with sleep clinic to help with bothersome insomnia symptoms  - Adult Sleep Eval & Management  Referral    Benign paroxysmal positional vertigo, unspecified laterality  Previous dx over past 5-10 years with a couple of ED visits.  Seems to improve with meclizine per pt and previous prescriptions.  Reviewed some side effects to watch for as she is getting older.  Checking for UTI, metabolic issues, anemia or low plt to suggest possible bleed contributing?  H/o stable menigioma- consider imaging this year if sx persist   - UA with Microscopic reflex to Culture - Clinic Collect  - Comprehensive metabolic panel (BMP + Alb, Alk Phos, ALT, AST, Total. Bili, TP)  - CBC with platelets  - meclizine (ANTIVERT) 25 MG tablet  Dispense: 30 tablet; Refill: 1  - Comprehensive metabolic panel (BMP + Alb, Alk Phos, ALT, AST, Total. Bili, TP)  - CBC with platelets  - UA Microscopic with Reflex to Culture    Need for Tdap vaccination  At pharmacy  - Tdap, tetanus-diptheria-acell pertussis, (BOOSTRIX) 5-2.5-18.5 LF-MCG/0.5 KEE injection  Dispense: 0.5 mL; Refill: 0    Need for vaccination against respiratory syncytial virus  At pharmacy  - respiratory syncytial virus vaccine, bivalent (ABRYSVO) injection  Dispense: 1 each; Refill: 0          BMI  Estimated body mass index is 26.65 kg/m  as calculated from the following:    Height as of this encounter: 1.56 m (5' 1.42\").    Weight as of this encounter: 64.9 kg (143 lb).         Ezequiel Mock is a 83 year old, presenting for the following health issues:  Dizziness and Nausea        5/6/2024    12:27 PM   Additional Questions   Roomed by M "   Accompanied by self     History of Present Illness       Reason for visit:  Dizziness  Symptom onset:  Today  Symptoms include:  Light headache, dizzy  Symptom intensity:  Severe  Symptom progression:  Staying the same  Had these symptoms before:  Yes  Has tried/received treatment for these symptoms:  No  What makes it worse:  Moving around.  What makes it better:  Na    She eats 0-1 servings of fruits and vegetables daily.She consumes 2 sweetened beverage(s) daily.   She is taking medications regularly.     Pt called triage today- reviewed note.  Was out gardening and suddenly became nauseated and dizzy.  Went inside to rest and noted sensation that room was spinning.  Notes she hasn't been drinking enough fluids.      Woke up this morning feeling lightheaded and took a tylenol.  Notes she will get this from time to time.  Was outside bending over to plant and got dizzy.  Notes it feels like vertigo which she has had before.  Has been in ED at [facility] before for this.  Very nauseated but did have surgery for hital hernia in the past and cannot throw up.  Went inside to rest on her couch and noted room spinning.  Did get dry heaving.  Wiped out and feels miserable today.      Notes ongoing insomnia.  Will take her sleeping pill every night and then will wake up at 2am.  Exhausted and notes that she just can't sleep.      Does have allergies acting up- uses allergy nasal spray for this      Denies: headache.  Hearing changes/ringing.  Heart symptoms. Breathing symptoms.  Stomach pain.  Changes in bowel habits.      More frequent urination for months now.     Not having vertigo right now- just lightheaded.      Tends to workout daily.   No heart history     Reviewed chart:   H/o menignioma treated with sterotactic surgery 2015.  Last follow-up 7/2023 with mri 6/2023 showing stable meningioma since 2021.  Recommending MRI in 2 years unless new sx.      Preop 2023- eye lid surgery     Lunesta for insonmnia but pt not  "taking   Temazepam 15mg hs prn  Hyperlipidemia on meds pravachol 20mg daily     Problem list also notes elevated  with pelvic mass- working with [facility]. Did have a complete hysterectomy and it was a cyst and not cancerous.       Cbc 4/2024 reviewed- no anemia  Plt stable 92     2018 ED visit - dx with BPV   2017 ED visit for BPV           Objective    /81 (BP Location: Left arm, Patient Position: Sitting, Cuff Size: Adult Regular)   Pulse 77   Temp 97.4  F (36.3  C) (Temporal)   Resp 21   Ht 1.56 m (5' 1.42\")   Wt 64.9 kg (143 lb)   SpO2 96%   BMI 26.65 kg/m    Body mass index is 26.65 kg/m .  Physical Exam   Complete 10 point ROS completed today as part of the exam and patient denies any symptoms as reviewed in HPI     Wt Readings from Last 3 Encounters:   05/06/24 64.9 kg (143 lb)   10/11/23 63.9 kg (140 lb 12.8 oz)   07/18/23 62.6 kg (138 lb)       No LMP recorded. Patient is postmenopausal.    All normal as below except abnormalities include: grossly normal exam. Pt able to get up on exam table without difficulty.  No nystagums or dizziness noted today with movement and laying down, etc.    General is a  83 year old sitting comfortably in no apparent distress   HEENT:  TM are clear bilaterally.  Eye exams within normal   Neck: Supple without lymphadenopathy or thyromegally  CV: Regular rate and rhythm S1S2 without rubs, murmurs or gallops,   Lungs: Clear to auscultation bilaterally  Abd:  +BS, soft NT/ND,  No masses or organomegally,   Extremities: Warm, No Edema, 2+ Pedal and radial pulses bilaterally  Skin: No lesions or rashes noted  Neuro: Able to ambulate around the exam room with equal movement, strength and normal coordination of the upper and lower extremeties symmetrically    History summarized from1-2:as above  Old Records-1: Outside allergies, meds, problems and immunizations were reconciled as needed from [facility]  Radiology tests reviewed-1: MRI over past several years  Lab " tests reviewed-1: [id number]  Medicine tests reviewed-1: EKG 2022 NSR         Lisa Jean Baptiste MD              Signed Electronically by: Lisa Jean Baptiste MD

## 2024-05-06 NOTE — LETTER
May 13, 2024      Emili Man  [address] [place] [address]        Dear ,    We are writing to inform you of your test results.    Please continue to follow with your PCP    We hope your dizziness is better  Your labs were all healthy and normal     Normal urine  Normal kidney and liver tests  Platelets were relatively stable at 75  No anemia or infections seen    Resulted Orders   UA with Microscopic reflex to Culture - Clinic Collect   Result Value Ref Range    Color Urine Yellow Colorless, Straw, Light Yellow, Yellow    Appearance Urine Slightly Cloudy (A) Clear    Glucose Urine Negative Negative mg/dL    Bilirubin Urine Negative Negative    Ketones Urine Negative Negative mg/dL    Specific Gravity Urine 1.020 1.005 - 1.030    Blood Urine Moderate (A) Negative    pH Urine 6.0 5.0 - 8.0    Protein Albumin Urine 30 (A) Negative mg/dL    Urobilinogen Urine 0.2 0.2, 1.0 E.U./dL    Nitrite Urine Negative Negative    Leukocyte Esterase Urine Small (A) Negative   Comprehensive metabolic panel (BMP + Alb, Alk Phos, ALT, AST, Total. Bili, TP)   Result Value Ref Range    Sodium 141 135 - 145 mmol/L      Comment:      Reference intervals for this test were updated on 09/26/2023 to more accurately reflect our healthy population. There may be differences in the flagging of prior results with similar values performed with this method. Interpretation of those prior results can be made in the context of the updated reference intervals.     Potassium 4.3 3.4 - 5.3 mmol/L    Carbon Dioxide (CO2) 24 22 - 29 mmol/L    Anion Gap 11 7 - 15 mmol/L    Urea Nitrogen 17.6 8.0 - 23.0 mg/dL    Creatinine 1.13 (H) 0.51 - 0.95 mg/dL    GFR Estimate 48 (L) >60 mL/min/1.73m2    Calcium 9.0 8.8 - 10.2 mg/dL    Chloride 106 98 - 107 mmol/L    Glucose 127 (H) 70 - 99 mg/dL    Alkaline Phosphatase 81 40 - 150 U/L      Comment:      Reference intervals for this test were updated on 11/14/2023 to more accurately reflect our healthy  population. There may be differences in the flagging of prior results with similar values performed with this method. Interpretation of those prior results can be made in the context of the updated reference intervals.    AST 28 0 - 45 U/L      Comment:      Reference intervals for this test were updated on 6/12/2023 to more accurately reflect our healthy population. There may be differences in the flagging of prior results with similar values performed with this method. Interpretation of those prior results can be made in the context of the updated reference intervals.    ALT 14 0 - 50 U/L      Comment:      Reference intervals for this test were updated on 6/12/2023 to more accurately reflect our healthy population. There may be differences in the flagging of prior results with similar values performed with this method. Interpretation of those prior results can be made in the context of the updated reference intervals.      Protein Total 7.3 6.4 - 8.3 g/dL    Albumin 4.6 3.5 - 5.2 g/dL    Bilirubin Total 0.4 <=1.2 mg/dL   CBC with platelets   Result Value Ref Range    WBC Count 5.4 4.0 - 11.0 10e3/uL    RBC Count 4.29 3.80 - 5.20 10e6/uL    Hemoglobin 13.5 11.7 - 15.7 g/dL    Hematocrit 41.5 35.0 - 47.0 %    MCV 97 78 - 100 fL    MCH 31.5 26.5 - 33.0 pg    MCHC 32.5 31.5 - 36.5 g/dL    RDW 13.6 10.0 - 15.0 %    Platelet Count 75 (L) 150 - 450 10e3/uL   UA Microscopic with Reflex to Culture   Result Value Ref Range    Bacteria Urine Moderate (A) None Seen /HPF    RBC Urine 0-2 0-2 /HPF /HPF    WBC Urine 0-5 0-5 /HPF /HPF    Squamous Epithelials Urine Few (A) None Seen /LPF    Transitional Epithelials Urine Few (A) None Seen /HPF    Hyaline Casts Urine 0-2 (A) None Seen /LPF    Narrative    Urine Culture not indicated       If you have any questions or concerns, please call the clinic at the number listed above.       Sincerely,      Lisa Jean Baptiste MD

## 2024-05-06 NOTE — TELEPHONE ENCOUNTER
The patient reports she was out in her garden today and suddenly became nauseated with dizziness  She reports the incident occurred within the past hour and she is resting on the couch.  She reports she may not be drinking as much fluids as required  She reports a hiatal hernia repair in October of 2023.  She is nauseated and the room spinning  Woke up lightheaded this morning.   Denies any fainting or slurred speech  Triage guidelines recommend to see in office today  Caller verbalized and understands directives    Reason for Disposition   MODERATE dizziness (e.g., interferes with normal activities)  (Exception: Dizziness caused by heat exposure, sudden standing, or poor fluid intake.)   Vomiting occurs with dizziness    Additional Information   Negative: SEVERE difficulty breathing (e.g., struggling for each breath, speaks in single words)   Negative: Shock suspected (e.g., cold/pale/clammy skin, too weak to stand, low BP, rapid pulse)   Negative: Difficult to awaken or acting confused (e.g., disoriented, slurred speech)   Negative: Fainted, and still feels dizzy afterwards   Negative: Overdose (accidental or intentional) of medications   Negative: New neurologic deficit that is present now: * Weakness of the face, arm, or leg on one side of the body * Numbness of the face, arm, or leg on one side of the body * Loss of speech or garbled speech   Negative: Heart beating < 50 beats per minute OR > 140 beats per minute   Negative: Sounds like a life-threatening emergency to the triager   Negative: Chest pain   Negative: Rectal bleeding, bloody stool, or tarry-black stool   Negative: Vomiting is main symptom   Negative: Diarrhea is main symptom   Negative: Headache is main symptom   Negative: Heat exhaustion suspected (i.e., dehydration from heat exposure)   Negative: Patient states that they are having an anxiety or panic attack   Negative: Dizziness from low blood sugar (i.e., < 60 mg/dl or 3.5 mmol/l)   Negative:  SEVERE dizziness (e.g., unable to stand, requires support to walk, feels like passing out now)   Negative: SEVERE headache or neck pain   Negative: Spinning or tilting sensation (vertigo) present now and one or more stroke risk factors (i.e., hypertension, diabetes mellitus, prior stroke/TIA, heart attack, age over 60) (Exception: Prior physician evaluation for this AND no different/worse than usual.)   Negative: Neurologic deficit that was brief (now gone), ANY of the following:* Weakness of the face, arm, or leg on one side of the body* Numbness of the face, arm, or leg on one side of the body* Loss of speech or garbled speech   Negative: Loss of vision or double vision  (Exception: Similar to previous migraines.)   Negative: Extra heartbeats, irregular heart beating, or heart is beating very fast (i.e., 'palpitations')   Negative: Difficulty breathing   Negative: Drinking very little and dehydration suspected (e.g., no urine > 12 hours, very dry mouth, very lightheaded)   Negative: Follows bleeding (e.g., stomach, rectum, vagina)  (Exception: Became dizzy from sight of small amount blood.)   Negative: Patient sounds very sick or weak to the triager   Negative: Lightheadedness (dizziness) present now, after 2 hours of rest and fluids   Negative: Spinning or tilting sensation (vertigo) present now   Negative: Fever > 103 F (39.4 C)   Negative: Fever > 100.0 F (37.8 C) and has diabetes mellitus or a weak immune system (e.g., HIV positive, cancer chemotherapy, organ transplant, splenectomy, chronic steroids)    Protocols used: Dizziness-A-OH

## 2024-05-06 NOTE — PATIENT INSTRUCTIONS
I think you have benign positional vertigo  We will check some labs to make sure it isn't something more    Take meclizine up to 3 times a day as needed-will help with nausea and dizziness    Can do home exercises for your dizziness- see handout     If not better in a couple of days we should check an MRI of your brain and consider physical therapy for your dizziness

## 2024-05-07 LAB
ALBUMIN SERPL BCG-MCNC: 4.6 G/DL (ref 3.5–5.2)
ALP SERPL-CCNC: 81 U/L (ref 40–150)
ALT SERPL W P-5'-P-CCNC: 14 U/L (ref 0–50)
ANION GAP SERPL CALCULATED.3IONS-SCNC: 11 MMOL/L (ref 7–15)
AST SERPL W P-5'-P-CCNC: 28 U/L (ref 0–45)
BILIRUB SERPL-MCNC: 0.4 MG/DL
BUN SERPL-MCNC: 17.6 MG/DL (ref 8–23)
CALCIUM SERPL-MCNC: 9 MG/DL (ref 8.8–10.2)
CHLORIDE SERPL-SCNC: 106 MMOL/L (ref 98–107)
CREAT SERPL-MCNC: 1.13 MG/DL (ref 0.51–0.95)
DEPRECATED HCO3 PLAS-SCNC: 24 MMOL/L (ref 22–29)
EGFRCR SERPLBLD CKD-EPI 2021: 48 ML/MIN/1.73M2
GLUCOSE SERPL-MCNC: 127 MG/DL (ref 70–99)
POTASSIUM SERPL-SCNC: 4.3 MMOL/L (ref 3.4–5.3)
PROT SERPL-MCNC: 7.3 G/DL (ref 6.4–8.3)
SODIUM SERPL-SCNC: 141 MMOL/L (ref 135–145)

## 2024-05-08 ENCOUNTER — NURSE TRIAGE (OUTPATIENT)
Dept: NURSING | Facility: CLINIC | Age: 84
End: 2024-05-08
Payer: MEDICARE

## 2024-05-08 NOTE — TELEPHONE ENCOUNTER
Telephone Call:     Pt was reviewing her AVS from her Appt on 5/6/2024 and noticed the following listed and called to ask what it is:         Writer educated patient on this vaccine and utilized the CDC website to answer more of her questions:   https://www.cdc.gov/vaccines/vpd/rsv/hcp/older-adults.html    Reason for Disposition   Follow-up information-only call to recent contact, no triage required    Protocols used: Information Only Call - No Triage-A-OH    Ceilne Sosa RN  [facility] Nurse Advisor 8:25 AM 5/8/2024

## 2024-05-13 ENCOUNTER — TELEPHONE (OUTPATIENT)
Dept: FAMILY MEDICINE | Facility: CLINIC | Age: 84
End: 2024-05-13
Payer: MEDICARE

## 2024-05-13 NOTE — RESULT ENCOUNTER NOTE
Team - please call patient with results and send result letter with this information if patient desires for their records.     Looks like she has follow-up this week with her pcp  Hope her dizziness is better   Her labs were all healthy and normal     Normal urine  Normal kidney and liver tests   Her platelets were relatively stable at 75   No anemia or infections seen

## 2024-05-13 NOTE — TELEPHONE ENCOUNTER
Patient returned call and read results to patient.  Also advised that a letter is coming in the mail.  Patient had no additional questions and was very happy with the lab results.    Ricarda Salgado RN on 5/13/2024 at 4:59 PM

## 2024-05-13 NOTE — TELEPHONE ENCOUNTER
Left message#1 for patient. Okay to relay message. Mailed letter     ----- Message from Lisa Jean Baptiste MD sent at 5/13/2024  3:13 PM CDT -----  Team - please call patient with results and send result letter with this information if patient desires for their records.     Looks like she has follow-up this week with her pcp  Hope her dizziness is better   Her labs were all healthy and normal     Normal urine  Normal kidney and liver tests   Her platelets were relatively stable at 75   No anemia or infections seen

## 2024-05-15 ENCOUNTER — OFFICE VISIT (OUTPATIENT)
Dept: INTERNAL MEDICINE | Facility: CLINIC | Age: 84
End: 2024-05-15
Payer: MEDICARE

## 2024-05-15 VITALS
SYSTOLIC BLOOD PRESSURE: 134 MMHG | BODY MASS INDEX: 26.81 KG/M2 | WEIGHT: 142 LBS | TEMPERATURE: 97.9 F | DIASTOLIC BLOOD PRESSURE: 68 MMHG | OXYGEN SATURATION: 98 % | RESPIRATION RATE: 17 BRPM | HEART RATE: 70 BPM | HEIGHT: 61 IN

## 2024-05-15 DIAGNOSIS — G43.809 VESTIBULAR MIGRAINE: ICD-10-CM

## 2024-05-15 DIAGNOSIS — Z29.11 NEED FOR VACCINATION AGAINST RESPIRATORY SYNCYTIAL VIRUS: ICD-10-CM

## 2024-05-15 DIAGNOSIS — D32.9 MENINGIOMA (H): ICD-10-CM

## 2024-05-15 DIAGNOSIS — R90.0 INTRACRANIAL SPACE-OCCUPYING LESION FOUND ON DIAGNOSTIC IMAGING OF CENTRAL NERVOUS SYSTEM: ICD-10-CM

## 2024-05-15 DIAGNOSIS — I65.01 OCCLUSION AND STENOSIS OF RIGHT VERTEBRAL ARTERY: ICD-10-CM

## 2024-05-15 DIAGNOSIS — Z23 NEED FOR TDAP VACCINATION: ICD-10-CM

## 2024-05-15 DIAGNOSIS — D69.3 IDIOPATHIC THROMBOCYTOPENIC PURPURA (H): ICD-10-CM

## 2024-05-15 DIAGNOSIS — Z23 NEED FOR SHINGLES VACCINE: ICD-10-CM

## 2024-05-15 DIAGNOSIS — N18.31 CHRONIC KIDNEY DISEASE, STAGE 3A (H): Primary | ICD-10-CM

## 2024-05-15 DIAGNOSIS — H81.10 BENIGN PAROXYSMAL POSITIONAL VERTIGO, UNSPECIFIED LATERALITY: ICD-10-CM

## 2024-05-15 DIAGNOSIS — G47.00 INSOMNIA, UNSPECIFIED TYPE: ICD-10-CM

## 2024-05-15 PROCEDURE — 91320 SARSCV2 VAC 30MCG TRS-SUC IM: CPT | Performed by: INTERNAL MEDICINE

## 2024-05-15 PROCEDURE — 99214 OFFICE O/P EST MOD 30 MIN: CPT | Performed by: INTERNAL MEDICINE

## 2024-05-15 PROCEDURE — 90480 ADMN SARSCOV2 VAC 1/ONLY CMP: CPT | Performed by: INTERNAL MEDICINE

## 2024-05-15 RX ORDER — RESPIRATORY SYNCYTIAL VIRUS VACCINE 120MCG/0.5
0.5 KIT INTRAMUSCULAR ONCE
Qty: 1 EACH | Refills: 0 | Status: SHIPPED | OUTPATIENT
Start: 2024-05-15 | End: 2024-05-15

## 2024-05-15 RX ORDER — TRAZODONE HYDROCHLORIDE 100 MG/1
100 TABLET ORAL AT BEDTIME
Qty: 90 TABLET | Refills: 3 | Status: SHIPPED | OUTPATIENT
Start: 2024-05-15

## 2024-05-15 ASSESSMENT — ENCOUNTER SYMPTOMS: DIZZINESS: 1

## 2024-05-15 NOTE — PATIENT INSTRUCTIONS
Trial of trazodone for sleep   Because you have been on temazepam for a long time you can't stop it suddenly so go to half the 15mg for a week then stop ( 7.5mg ) while you are on trazodone    1. Sleep referral   2. Balance therapy referral   3. Hearing referral   4 MRI scans

## 2024-06-04 ENCOUNTER — TRANSFERRED RECORDS (OUTPATIENT)
Dept: HEALTH INFORMATION MANAGEMENT | Facility: CLINIC | Age: 84
End: 2024-06-04
Payer: MEDICARE

## 2024-06-14 ENCOUNTER — TRANSFERRED RECORDS (OUTPATIENT)
Dept: HEALTH INFORMATION MANAGEMENT | Facility: CLINIC | Age: 84
End: 2024-06-14
Payer: MEDICARE

## 2024-06-23 ENCOUNTER — HOSPITAL ENCOUNTER (OUTPATIENT)
Dept: MRI IMAGING | Facility: CLINIC | Age: 84
Discharge: HOME | End: 2024-06-23
Attending: INTERNAL MEDICINE | Admitting: INTERNAL MEDICINE
Payer: MEDICARE

## 2024-06-23 DIAGNOSIS — H81.10 BENIGN PAROXYSMAL POSITIONAL VERTIGO, UNSPECIFIED LATERALITY: ICD-10-CM

## 2024-06-23 DIAGNOSIS — G43.809 VESTIBULAR MIGRAINE: ICD-10-CM

## 2024-06-23 DIAGNOSIS — R90.0 INTRACRANIAL SPACE-OCCUPYING LESION FOUND ON DIAGNOSTIC IMAGING OF CENTRAL NERVOUS SYSTEM: ICD-10-CM

## 2024-06-23 DIAGNOSIS — I65.01 OCCLUSION AND STENOSIS OF RIGHT VERTEBRAL ARTERY: ICD-10-CM

## 2024-06-23 DIAGNOSIS — D32.9 MENINGIOMA (H): ICD-10-CM

## 2024-06-23 PROCEDURE — A9585 GADOBUTROL INJECTION: HCPCS | Mod: JZ | Performed by: INTERNAL MEDICINE

## 2024-06-23 PROCEDURE — 70544 MR ANGIOGRAPHY HEAD W/O DYE: CPT | Mod: MF,XU

## 2024-06-23 PROCEDURE — 255N000002 HC RX 255 OP 636: Mod: JZ | Performed by: INTERNAL MEDICINE

## 2024-06-23 PROCEDURE — 70549 MR ANGIOGRAPH NECK W/O&W/DYE: CPT | Mod: MF

## 2024-06-23 PROCEDURE — 70553 MRI BRAIN STEM W/O & W/DYE: CPT | Mod: ME

## 2024-06-23 RX ORDER — GADOBUTROL 604.72 MG/ML
10 INJECTION INTRAVENOUS ONCE
Status: COMPLETED | OUTPATIENT
Start: 2024-06-23 | End: 2024-06-23

## 2024-06-23 RX ADMIN — GADOBUTROL 10 ML: 604.72 INJECTION INTRAVENOUS at 13:24

## 2024-06-23 NOTE — LETTER
June 26, 2024      Emili Man  [address] [place] [address]        Dear ,    We are writing to inform you of your test results.    I called and left voicemail for her to call back  For MRI scans shows same meningioma there are no changes that are apparently noted.  Deviously her dizzy spells were not considered to be related to the meningioma but given her new symptoms she can go back to Dr. Hoang who saw her for this last year.  For her opinion.         Resulted Orders   MRA Neck (Carotids) wo & w Contrast    Narrative    EXAM: MR BRAIN W/O and W CONTRAST, MRA NECK (CAROTIDS) W/O and W CONTRAST, MRA BRAIN ([place]um[place] OF BARNES) W/O CONTRAST  LOCATION: [facility]  DATE: 06/23/2024    INDICATION: Meningioma (H).  COMPARISON: MRI brain 06/28/2023.  CONTRAST: 10 mL alexander (accession [id number]), 10 mL alexander (accession [id number]), 10 mL alexander (accession [id number]).  TECHNIQUE:   1) Routine multiplanar multisequence head MRI without and with intravenous contrast.  2) 3D time-of-flight head MRA without intravenous contrast.  3) Neck MRA without and with IV contrast. Stenosis measurements made according to NASCET criteria unless otherwise specified.    FINDINGS:  HEAD MRI:  INTRACRANIAL CONTENTS: No areas of abnormally restricted diffusion to suggest acute or subacute infarct. No areas of abnormal parenchymal susceptibility.    Again seen is a rounded extra-axial area of abnormal enhancement centered within the lateral right cavernous sinus and measuring up to 2.4 x 2.4 x 2.4 cm in maximal dimensions compared with 2.5 x 2.4 x 2.3 cm on the prior. Differences are favored to   reflect differences in slice selection/positioning. Although nonspecific, appearance of the lesion is most compatible with a meningioma. The meningioma is again seen to encase the right carotid terminus and proximal right middle cerebral artery.    No other areas of abnormal enhancement. Mild prominence of the  lateral ventricles. Normal gray-white matter differentiation. There are a few small foci of nonenhancing FLAIR hyperintensity within the cerebral white matter, nonspecific but compatible with   foci of gliosis or chronic myelin loss and similar to prior. No mass effect or midline shift.    Cerebellar tonsils are normally positioned. Visualized upper cervical spinal cord, sella, and corpus callosum are unremarkable. Major skull base vascular flow voids are preserved.    OSSEOUS STRUCTURES/SOFT TISSUES: Visualized marrow space is unremarkable.    ORBITS: No abnormality accounting for technique.     SINUSES/MASTOIDS: Minimal ethmoid sinus mucosal thickening. No middle ear or mastoid effusion.       HEAD MRA:   ANTERIOR CIRCULATION: There is a 2.6 x 1.8 mm laterally projecting outpouching arising from the lateral wall of the proximal cavernous segment of the right internal carotid artery (axial images 68 and 69 of series 8) compatible with a small aneurysm.   There is mild narrowing of the M1 segment of the right middle cerebral artery and to a lesser extent of the right carotid terminus. A1 segment of the right anterior cerebral artery is absent with flow to the A2 and more distal segments supplied via the   left A1 segment. Left middle cerebral artery is normal in appearance.    POSTERIOR CIRCULATION: Intracranial vertebral arteries, basilar artery, and posterior cerebral arteries are patent. Small caliber P1 segment of the right posterior cerebral artery with majority of flow supplied via the large caliber right posterior   communicating artery. Left posterior communicating artery not identified.      NECK MRA:   RIGHT CAROTID: Left common carotid artery arises at the takeoff of the right brachiocephalic artery. No measurable stenosis or dissection.    LEFT CAROTID: No measurable stenosis or dissection.    VERTEBRAL ARTERIES: No focal stenosis or dissection. Left vertebral artery is larger than the right. Small  caliber right vertebral artery.    AORTIC ARCH: Classic aortic arch anatomy with no significant stenosis at the origin of the great vessels.        Impression    IMPRESSION:  HEAD MRI:   1.  Extra-axial dural-based mass is again seen centered within the right cavernous sinus, and although nonspecific compatible with a meningioma. This is again seen to encase the carotid terminus and proximal right middle cerebral artery with mild   associated luminal narrowing.  2.  Mild volume loss.    HEAD MRA:   1.  There is mild narrowing of the right carotid terminus and M1 segment of the right middle cerebral artery.  2.  There is a small laterally projecting outpouching arising from the proximal lateral wall of the cavernous segment of the right ICA compatible with a small aneurysm.    NECK MRA:  1.  No significant stenosis of either cervical carotid or vertebral system. No findings for dissection.       If you have any questions or concerns, please call the clinic at the number listed above.       Sincerely,      Leslie Desir MD

## 2024-06-26 ENCOUNTER — TELEPHONE (OUTPATIENT)
Dept: INTERNAL MEDICINE | Facility: CLINIC | Age: 84
End: 2024-06-26
Payer: MEDICARE

## 2024-06-26 NOTE — RESULT ENCOUNTER NOTE
Team - please call patient with results.  I called and left voicemail for her to call back  For MRI scans shows same meningioma there are no changes that are apparently noted.  Deviously her dizzy spells were not considered to be related to the meningioma but given her new symptoms she can go back to Dr. Hoang who saw her for this last year.  For her opinion.

## 2024-07-25 NOTE — PROGRESS NOTES
AUDIOLOGY REPORT    SUBJECTIVE:  Emili Man is a 83 year old female who was seen in the Audiology Clinic at the [facility] for audiologic evaluation, referred by Leslie Desir M.D. They were accompanied today by their self. Patient reports a history of vertigo, on and off for may years. Last episode was Spring 2024. She also reports lightheadedness, and dizziness when she turns quickly or turns certain directions. Has never met with Physical Therapy (PT). Patient's PCP has placed PT orders for benign paroxysmal positional vertigo.  Reports difficulty hearing on the her home phone at times. Also hearing/understanding people who mumble or speak quickly. Intermittent tinnitus in both ears for many years. Denied otalgia, otorrhea, aural fullness, history of ear surgery, loud noise exposure, and previous hearing aid use.     OBJECTIVE:  Abuse Screening:  Do you feel unsafe at home or work/school? No  Do you feel threatened by someone? No  Does anyone try to keep you from having contact with others, or doing things outside of your home? No  Physical signs of abuse present? No     Fall Risk Screen:  1. Have you fallen two or more times in the past year? No  2. Have you fallen and had an injury in the past year? No      Otoscopic exam indicates minimal obstruction with cerumen bilaterally     Pure Tone Thresholds assessed using conventional audiometry with good reliability from 250-8000 Hz bilaterally using insert earphones and circumaural headphones.     RIGHT:  Normal hearing from 250-2000 Hz, sloping to a mild to severe sensorineural hearing loss (SNHL)    LEFT:   Normal hearing from 250-3000 Hz, sloping to a moderate to moderately severe sensorineural hearing loss (SNHL).     Improvement with inserts.     Tympanogram:    RIGHT: Normal eardrum mobility    LEFT:   Hypermobile    Reflexes (reported by stimulus ear):  RIGHT: Ipsilateral is present at normal levels  RIGHT: Contralateral is  present at normal levels  LEFT:   Ipsilateral is present at normal levels  LEFT:   Contralateral is present at normal levels    Speech Reception Threshold:    RIGHT: 20 dB HL    LEFT:   15 dB HL    Word Recognition Score:     RIGHT: 100% at 60 dB HL using NU-6 recorded word list.    LEFT:   100% at 60 dB HL using NU-6 recorded word list.      ASSESSMENT:   Today's results reveal normal hearing from 250-2000 Hz, sloping to a mild to severe SNHL in the right ear, and normal hearing from 250-3000 Hz, sloping to a moderate to moderately severe SNHL in the left ear. Today s results were discussed with the patient in detail. Briefly discussed hearing aids and monitoring hearing.      PLAN:   It is recommended that patient monitor hearing with repeat hearing test in one year, sooner if new concerns arise. Patient not interested in hearing aids at this time. Patient's PCP has already placed Physical Therapy (PT) orders. Patient signed a release of information (ARACELY) and she would like a copy of the audiogram and today's note mailed to the address on file. Please call this clinic with questions regarding these results or recommendations.    Rojas Barron, CCC-A  [place] Licensed Audiologist #[id number]

## 2024-07-28 DIAGNOSIS — E78.5 HYPERLIPIDEMIA: ICD-10-CM

## 2024-07-29 RX ORDER — PRAVASTATIN SODIUM 20 MG
20 TABLET ORAL DAILY
Qty: 90 TABLET | Refills: 2 | Status: SHIPPED | OUTPATIENT
Start: 2024-07-29

## 2024-08-01 ENCOUNTER — OFFICE VISIT (OUTPATIENT)
Dept: AUDIOLOGY | Facility: CLINIC | Age: 84
End: 2024-08-01
Payer: MEDICARE

## 2024-08-01 DIAGNOSIS — H81.10 BENIGN PAROXYSMAL POSITIONAL VERTIGO, UNSPECIFIED LATERALITY: ICD-10-CM

## 2024-08-01 DIAGNOSIS — H90.3 SENSORINEURAL HEARING LOSS (SNHL) OF BOTH EARS: Primary | ICD-10-CM

## 2024-08-01 DIAGNOSIS — H93.13 TINNITUS OF BOTH EARS: ICD-10-CM

## 2024-08-01 PROCEDURE — 92557 COMPREHENSIVE HEARING TEST: CPT | Performed by: AUDIOLOGIST

## 2024-08-01 PROCEDURE — 92550 TYMPANOMETRY & REFLEX THRESH: CPT | Performed by: AUDIOLOGIST

## 2024-09-06 DIAGNOSIS — G47.00 INSOMNIA, UNSPECIFIED TYPE: ICD-10-CM

## 2024-09-06 RX ORDER — TEMAZEPAM 15 MG/1
CAPSULE ORAL
Qty: 90 CAPSULE | Refills: 5 | Status: SHIPPED | OUTPATIENT
Start: 2024-09-06

## 2024-12-06 ENCOUNTER — TELEPHONE (OUTPATIENT)
Dept: INTERNAL MEDICINE | Facility: CLINIC | Age: 84
End: 2024-12-06
Payer: MEDICARE

## 2024-12-06 NOTE — TELEPHONE ENCOUNTER
General Call      Reason for Call:  Experiencing dizziness today upon awakening, so was going to do exercises prescribed by Markel in May of this year. Unfortunately, the handout was nowhere to be found. Is calling to hopefully get another copy of the exercises so that she might have them for her use at home.    What are your questions or concerns:  Would you be able to mail this information to her?    Date of last appointment with provider: 09.06.24    Okay to leave a detailed message?: Yes at Home number on file [phone] (home)

## 2024-12-09 NOTE — TELEPHONE ENCOUNTER
Called pt, she has been having a bout of vertigo. Just wants a copy of the AVS that has the exercises in it. Printed and placed in mail per request. Unable to come  at clinic. Has also been using meclizine and tylenol.

## 2025-01-03 ENCOUNTER — TELEPHONE (OUTPATIENT)
Dept: INTERNAL MEDICINE | Facility: CLINIC | Age: 85
End: 2025-01-03
Payer: MEDICARE

## 2025-01-03 DIAGNOSIS — Z78.0 POST-MENOPAUSAL: ICD-10-CM

## 2025-01-03 DIAGNOSIS — M85.80 OSTEOPENIA: Primary | ICD-10-CM

## 2025-01-03 NOTE — TELEPHONE ENCOUNTER
Order/Referral Request    Who is requesting:  myself  Orders being requested: Bone density    Reason service is needed/diagnosis: due for one needed to recheck    When are orders needed by:  asap    Has this been discussed with Provider: Yes    Does patient have a preference on a Group/Provider/Facility?  [address] location    Does patient have an appointment scheduled?: No    Where to send orders: Place orders within Epic    Okay to leave a detailed message?: Yes at Cell number on file:    No relevant phone numbers on file.[phone]

## 2025-01-07 NOTE — TELEPHONE ENCOUNTER
Per chart patient's last DXA was 8/3/2021.    New order pended to review and sign if agreeable.

## 2025-01-21 ENCOUNTER — ANCILLARY PROCEDURE (OUTPATIENT)
Dept: BONE DENSITY | Facility: CLINIC | Age: 85
End: 2025-01-21
Attending: INTERNAL MEDICINE
Payer: COMMERCIAL

## 2025-01-21 DIAGNOSIS — Z78.0 POST-MENOPAUSAL: ICD-10-CM

## 2025-01-21 PROCEDURE — 77080 DXA BONE DENSITY AXIAL: CPT | Mod: TC | Performed by: RADIOLOGY

## 2025-01-21 PROCEDURE — 77081 DXA BONE DENSITY APPENDICULR: CPT | Mod: TC | Performed by: RADIOLOGY

## 2025-02-26 ENCOUNTER — TRANSFERRED RECORDS (OUTPATIENT)
Dept: HEALTH INFORMATION MANAGEMENT | Facility: CLINIC | Age: 85
End: 2025-02-26
Payer: MEDICARE

## 2025-02-28 ENCOUNTER — TELEPHONE (OUTPATIENT)
Dept: INTERNAL MEDICINE | Facility: CLINIC | Age: 85
End: 2025-02-28
Payer: MEDICARE

## 2025-02-28 NOTE — TELEPHONE ENCOUNTER
New Medication Request    Contacts       Contact Date/Time Type Contact Phone/Fax    02/28/2025 02:48 PM CST Fax (Incoming) [facility] #[id number] [place] [id number] [facility] (Pharmacy) [phone]            What medication are you requesting?: drug not covered by patient plan.  The preferred alternative is Lorazepam  Please send in new script if appropriate for patient.    Reason for medication request:     this is not covered-  temazepam (RESTORIL) 15 MG capsule 90 capsule 5 9/6/2024 -- No   Sig: TAKE 1 CAPSULE(15 MG) BY MOUTH AT BEDTIME AS NEEDED FOR SLEEP   Sent to pharmacy as: Temazepam 15 MG Oral Capsule (RESTORIL)   Class: E-Prescribe   Order: [id number]   E-Prescribing Status: Receipt confirmed by pharmacy (9/6/2024  3:20 PM CDT       Controlled Substance Agreement on file:   CSA -- Patient Level:     [Media Unavailable] Controlled Substance Agreement - Non - Opioid - Scan on 8/4/2021  4:02 PM: NON-OPIOID CONTROLLED SUBSTANCE AGREEMENT         Preferred Pharmacy:   [facility] #[id number] [place], [place] - [address] [address]  Phone: [phone] Fax: [phone]

## 2025-03-03 NOTE — TELEPHONE ENCOUNTER
They send in prior authorization as she needs a longer acting medicine otherwise patient needs to make an appointment to discuss the alternatives.

## 2025-03-04 NOTE — TELEPHONE ENCOUNTER
PA Initiation    Medication: TEMAZEPAM 15 MG PO CAPS  Insurance Company: [employer] Non-Specialty PA's - Phone [phone] Fax [phone]  Pharmacy Filling the Rx: [facility] #[id number] [address]  Filling Pharmacy Phone:    Filling Pharmacy Fax:    Start Date: 3/4/2025          Thank you,    Alejandra Arnold  Oncology Pharmacy Liaison II  [email or web address]  Phone: [phone]  Fax: [phone]

## 2025-03-05 NOTE — TELEPHONE ENCOUNTER
Prior Authorization Approval    Medication: TEMAZEPAM 15 MG PO CAPS  Authorization Effective Date: 2/2/2025  Authorization Expiration Date: 3/4/2026  Approved Dose/Quantity: 90 per 90 DS  Reference #: [id number]   Insurance Company: [employer] Non-Specialty PA's - Phone [phone] Fax [phone]  Expected CoPay: $ 1.61  CoPay Card Available:      Financial Assistance Needed: no  Which Pharmacy is filling the prescription: [facility] #[id number] [address]  Pharmacy Notified: yes  Patient Notified: yes          Thank you,    Aye Arnold  Oncology Pharmacy Liaison II  [email or web address]  Phone: [phone]  Fax: [phone]

## 2025-04-25 PROBLEM — R97.1 ELEVATED CANCER ANTIGEN 125 (CA-125): Status: RESOLVED | Noted: 2024-05-06 | Resolved: 2025-04-25

## 2025-04-25 PROBLEM — Z98.890 S/P NISSEN FUNDOPLICATION (WITHOUT GASTROSTOMY TUBE) PROCEDURE: Status: ACTIVE | Noted: 2025-04-25

## 2025-04-25 PROBLEM — R19.00 PELVIC MASS: Status: RESOLVED | Noted: 2024-05-06 | Resolved: 2025-04-25

## 2025-04-27 DIAGNOSIS — E78.5 HYPERLIPIDEMIA: ICD-10-CM

## 2025-04-28 RX ORDER — PRAVASTATIN SODIUM 20 MG
20 TABLET ORAL DAILY
Qty: 90 TABLET | Refills: 2 | Status: SHIPPED | OUTPATIENT
Start: 2025-04-28

## 2025-04-30 ENCOUNTER — TELEPHONE (OUTPATIENT)
Dept: INTERNAL MEDICINE | Facility: CLINIC | Age: 85
End: 2025-04-30
Payer: MEDICARE

## 2025-04-30 DIAGNOSIS — I10 HYPERTENSION, UNSPECIFIED TYPE: Primary | ICD-10-CM

## 2025-04-30 RX ORDER — LISINOPRIL 5 MG/1
5 TABLET ORAL DAILY
Qty: 90 TABLET | Refills: 3 | Status: SHIPPED | OUTPATIENT
Start: 2025-04-30

## 2025-05-10 ENCOUNTER — RESULTS FOLLOW-UP (OUTPATIENT)
Dept: INTERNAL MEDICINE | Facility: CLINIC | Age: 85
End: 2025-05-10
Payer: MEDICARE

## 2025-05-10 NOTE — LETTER
Leno 10, 2025      Emili Lucio  [address] [place] [address]        Dear ,    We are writing to inform you of your test results.    I apologize for the delay in sending the result note out. The labs were reviewed immediately   Overall good results. No diabetes , normal liver tests , no anemia , cholesterol levels and thyroid test are good ,  There is mild kidney disease but stable , platelet count is low but stable . At this level there should be no bleeding but this needs to be monitored .  I recommend blood tests in 6 months , this can be a lab only visit .       Resulted Orders   UA Macroscopic with reflex to Microscopic and Culture - Lab Collect   Result Value Ref Range    Color Urine Yellow Colorless, Straw, Light Yellow, Yellow    Appearance Urine Clear Clear    Glucose Urine Negative Negative mg/dL    Bilirubin Urine Negative Negative    Ketones Urine Negative Negative mg/dL    Specific Gravity Urine 1.020 1.005 - 1.030    Blood Urine Moderate (A) Negative    pH Urine 5.5 5.0 - 8.0    Protein Albumin Urine 30 (A) Negative mg/dL    Urobilinogen Urine 0.2 0.2, 1.0 E.U./dL    Nitrite Urine Negative Negative    Leukocyte Esterase Urine Small (A) Negative   Albumin Random Urine Quantitative with Creat Ratio   Result Value Ref Range    Creatinine Urine mg/dL 101.0 mg/dL      Comment:      The reference ranges have not been established in urine creatinine. The results should be integrated into the clinical context for interpretation.    Albumin Urine mg/L 51.1 mg/L      Comment:      The reference ranges have not been established in urine albumin. The results should be integrated into the clinical context for interpretation.    Albumin Urine mg/g Cr 50.59 (H) 0.00 - 25.00 mg/g Cr      Comment:      Microalbuminuria is defined as an albumin:creatinine ratio of 17 to 299 for males and 25 to 299 for females. A ratio of albumin:creatinine of 300 or higher is indicative of overt proteinuria.  Due to  biologic variability, positive results should be confirmed by a second, first-morning random or 24-hour timed urine specimen. If there is discrepancy, a third specimen is recommended. When 2 out of 3 results are in the microalbuminuria range, this is evidence for incipient nephropathy and warrants increased efforts at glucose control, blood pressure control, and institution of therapy with an angiotensin-converting-enzyme (ACE) inhibitor (if the patient can tolerate it).     UA Microscopic with Reflex to Culture   Result Value Ref Range    Bacteria Urine Moderate (A) None Seen /HPF    RBC Urine 5-10 (A) 0-2 /HPF /HPF    WBC Urine 10-25 (A) 0-5 /HPF /HPF    Squamous Epithelials Urine Moderate (A) None Seen /LPF   Urine Culture   Result Value Ref Range    Culture 10,000-50,000 CFU/mL Mixture of Urogenital Geno    Methylmalonic Acid   Result Value Ref Range    Methylmalonic Acid 0.17 0.00 - 0.40 umol/L      Comment:      INTERPRETIVE INFORMATION:    Slight elevation 0.41-0.99 umol/L is consistent with mild vitamin B12 deficiency, renal insufficiency, or intravascular volume contraction.    Moderate elevation 1.00-9.99 umol/L is consistent with mild vitamin B12 deficiency.    Massive elevation 10.00 umol/L or greater is consistent with significant vitamin B12 deficiency or with inborn errors of metabolism.    Narrative    This test was developed and its performance characteristics determined by the [facility],  Special [facility] Laboratory. It has not been cleared or approved by the FDA. The laboratory is regulated under CLIA as qualified to perform high-complexity testing. This test is used for clinical purposes. It should not be regarded as investigational or for research.   Lipid panel reflex to direct LDL Non-fasting   Result Value Ref Range    Cholesterol 174 <200 mg/dL    Triglycerides 171 (H) <150 mg/dL    Direct Measure HDL 34 (L) >=50 mg/dL    LDL Cholesterol Calculated 106 (H) <100  mg/dL    Non HDL Cholesterol 140 (H) <130 mg/dL    Patient Fasting > 8hrs? No     Narrative    Cholesterol  Desirable: < 200 mg/dL  Borderline High: 200 - 239 mg/dL  High: >= 240 mg/dL    Triglycerides  Normal: < 150 mg/dL  Borderline High: 150 - 199 mg/dL  High: 200-499 mg/dL  Very High: >= 500 mg/dL    Direct Measure HDL  Female: >= 50 mg/dL   Male: >= 40 mg/dL    LDL Cholesterol  Desirable: < 100 mg/dL  Above Desirable: 100 - 129 mg/dL   Borderline High: 130 - 159 mg/dL   High:  160 - 189 mg/dL   Very High: >= 190 mg/dL    Non HDL Cholesterol  Desirable: < 130 mg/dL  Above Desirable: 130 - 159 mg/dL  Borderline High: 160 - 189 mg/dL  High: 190 - 219 mg/dL  Very High: >= 220 mg/dL   Vitamin D Deficiency   Result Value Ref Range    Vitamin D, Total (25-Hydroxy) 58 (H) 20 - 50 ng/mL      Comment:      indicates supplementation, with increased risk of hypercalciuria    Narrative    Season, race, dietary intake, and treatment affect the concentration of 25-hydroxy-Vitamin D. Values may decrease during winter months and increase during summer months.    Vitamin D determination is routinely performed by an immunoassay specific for 25 hydroxyvitamin D3.  If an individual is on vitamin D2(ergocalciferol) supplementation, please specify 25 OH vitamin D2 and D3 level determination by LCMSMS test VITD23.     TSH   Result Value Ref Range    TSH 1.84 0.30 - 4.20 uIU/mL   Comprehensive metabolic panel   Result Value Ref Range    Sodium 140 135 - 145 mmol/L    Potassium 4.1 3.4 - 5.3 mmol/L    Carbon Dioxide (CO2) 26 22 - 29 mmol/L    Anion Gap 9 7 - 15 mmol/L    Urea Nitrogen 17.3 8.0 - 23.0 mg/dL    Creatinine 1.11 (H) 0.51 - 0.95 mg/dL    GFR Estimate 48 (L) >60 mL/min/1.73m2      Comment:      eGFR calculated using 2021 CKD-EPI equation.    Calcium 9.5 8.8 - 10.4 mg/dL    Chloride 105 98 - 107 mmol/L    Glucose 103 (H) 70 - 99 mg/dL    Alkaline Phosphatase 82 40 - 150 U/L    AST 26 0 - 45 U/L    ALT 11 0 - 50 U/L    Protein  Total 7.3 6.4 - 8.3 g/dL    Albumin 4.5 3.5 - 5.2 g/dL    Bilirubin Total 0.4 <=1.2 mg/dL    Patient Fasting > 8hrs? No    Hemoglobin A1c   Result Value Ref Range    Estimated Average Glucose 114 <117 mg/dL    Hemoglobin A1C 5.6 0.0 - 5.6 %      Comment:      Normal <5.7%   Prediabetes 5.7-6.4%    Diabetes 6.5% or higher     Note: Adopted from ADA consensus guidelines.   Folate   Result Value Ref Range    Folic Acid 19.0 4.6 - 34.8 ng/mL   CBC with platelets and differential   Result Value Ref Range    WBC Count 3.9 (L) 4.0 - 11.0 10e3/uL    RBC Count 4.29 3.80 - 5.20 10e6/uL    Hemoglobin 13.4 11.7 - 15.7 g/dL    Hematocrit 40.6 35.0 - 47.0 %    MCV 95 78 - 100 fL    MCH 31.2 26.5 - 33.0 pg    MCHC 33.0 31.5 - 36.5 g/dL    RDW 13.8 10.0 - 15.0 %    Platelet Count 88 (L) 150 - 450 10e3/uL    % Neutrophils 54 %    % Lymphocytes 24 %    % Monocytes 19 %    % Eosinophils 1 %    % Basophils 1 %    % Immature Granulocytes 1 %    NRBCs per 100 WBC 0 <1 /100    Absolute Neutrophils 2.1 1.6 - 8.3 10e3/uL    Absolute Lymphocytes 0.9 0.8 - 5.3 10e3/uL    Absolute Monocytes 0.7 0.0 - 1.3 10e3/uL    Absolute Eosinophils 0.0 0.0 - 0.7 10e3/uL    Absolute Basophils 0.0 0.0 - 0.2 10e3/uL    Absolute Immature Granulocytes 0.1 <=0.4 10e3/uL    Absolute NRBCs 0.0 10e3/uL   Direct antiglobulin test, adult   Result Value Ref Range    RUEL Anti-IgG,-C3d Negative     SPECIMEN EXPIRATION DATE [id number]        If you have any questions or concerns, please call the clinic at the number listed above.       Sincerely,      Leslie Desir MD    Electronically signed

## 2025-05-11 NOTE — TELEPHONE ENCOUNTER
"Lab Results   Component Value Date    WBC 3.9 04/25/2025     Lab Results   Component Value Date    RBC 4.29 04/25/2025     Lab Results   Component Value Date    HGB 13.4 04/25/2025     Lab Results   Component Value Date    HCT 40.6 04/25/2025     No components found for: \"MCT\"  Lab Results   Component Value Date    MCV 95 04/25/2025     Lab Results   Component Value Date    MCH 31.2 04/25/2025     Lab Results   Component Value Date    MCHC 33.0 04/25/2025     Lab Results   Component Value Date    RDW 13.8 04/25/2025     Lab Results   Component Value Date    PLT 88 04/25/2025     Lab Results   Component Value Date    WBC 3.9 04/25/2025     Lab Results   Component Value Date    RBC 4.29 04/25/2025     Lab Results   Component Value Date    HGB 13.4 04/25/2025     Lab Results   Component Value Date    HCT 40.6 04/25/2025     Lab Results   Component Value Date    MCV 95 04/25/2025     Lab Results   Component Value Date    MCH 31.2 04/25/2025     Lab Results   Component Value Date    MCHC 33.0 04/25/2025     Lab Results   Component Value Date    RDW 13.8 04/25/2025     Lab Results   Component Value Date    PLT 88 04/25/2025       "

## 2025-05-14 ENCOUNTER — OFFICE VISIT (OUTPATIENT)
Dept: BEHAVIORAL HEALTH | Facility: CLINIC | Age: 85
End: 2025-05-14
Payer: MEDICARE

## 2025-05-14 DIAGNOSIS — G47.00 INSOMNIA, UNSPECIFIED TYPE: ICD-10-CM

## 2025-05-14 DIAGNOSIS — F51.04 PSYCHOPHYSIOLOGIC INSOMNIA: Primary | ICD-10-CM

## 2025-05-14 PROCEDURE — 90832 PSYTX W PT 30 MINUTES: CPT | Performed by: MARRIAGE & FAMILY THERAPIST

## 2025-05-14 ASSESSMENT — PATIENT HEALTH QUESTIONNAIRE - PHQ9
SUM OF ALL RESPONSES TO PHQ QUESTIONS 1-9: 4
SUM OF ALL RESPONSES TO PHQ QUESTIONS 1-9: 4
10. IF YOU CHECKED OFF ANY PROBLEMS, HOW DIFFICULT HAVE THESE PROBLEMS MADE IT FOR YOU TO DO YOUR WORK, TAKE CARE OF THINGS AT HOME, OR GET ALONG WITH OTHER PEOPLE: NOT DIFFICULT AT ALL

## 2025-05-14 NOTE — PROGRESS NOTES
[facility] Primary Care: Integrated Behavioral Health    Integrated Behavioral Health   Mental Health & Addiction Services      Progress Note - Initial [facility] Visit     Patient Name: Emili Man    Date: May 14, 2025  Service Type: Individual   Visit Start Time: 256PM  Visit End Time:  330PM   Attendees: Patient   Service Modality: In-person     [facility] Visit Activities (Refresh list every visit): NEW and [facility] Only         DATA:     Interactive Complexity: No   Crisis: No     Assessments completed:     The following assessments were completed by patient for this visit:  PHQ9:       11/24/2020     9:22 AM 5/6/2024    12:24 PM 5/14/2025     2:54 PM   PHQ-9 SCORE   PHQ-9 Total Score MyChart  5 (Mild depression) 4 (Minimal depression)   PHQ-9 Total Score 0 5 4        Patient-reported     PHQA:        No data to display              GAD7:        No data to display              CAGE-AID:       5/14/2025     2:58 PM   CAGE-AID Total Score   Total Score 0    Total Score MyChart 0 (A total score of 2 or greater is considered clinically significant)       Patient-reported     PROMIS 10-Global Health (all questions and answers displayed):       5/14/2025     2:57 PM   PROMIS 10   In general, would you say your health is: Good   In general, would you say your quality of life is: Good   In general, how would you rate your physical health? Good   In general, how would you rate your mental health, including your mood and your ability to think? Good   In general, how would you rate your satisfaction with your social activities and relationships? Very good   In general, please rate how well you carry out your usual social activities and roles Good   To what extent are you able to carry out your everyday physical activities such as walking, climbing stairs, carrying groceries, or moving a chair? Completely   In the past 7 days, how often have you been bothered by emotional problems such as feeling anxious,  depressed, or irritable? Rarely   In the past 7 days, how would you rate your fatigue on average? Mild   In the past 7 days, how would you rate your pain on average, where 0 means no pain, and 10 means worst imaginable pain? 0   In general, would you say your health is: 3   In general, would you say your quality of life is: 3   In general, how would you rate your physical health? 3   In general, how would you rate your mental health, including your mood and your ability to think? 3   In general, how would you rate your satisfaction with your social activities and relationships? 4   In general, please rate how well you carry out your usual social activities and roles. (This includes activities at home, at work and in your community, and responsibilities as a parent, child, spouse, employee, friend, etc.) 3   To what extent are you able to carry out your everyday physical activities such as walking, climbing stairs, carrying groceries, or moving a chair? 5   In the past 7 days, how often have you been bothered by emotional problems such as feeling anxious, depressed, or irritable? 2   In the past 7 days, how would you rate your fatigue on average? 2   In the past 7 days, how would you rate your pain on average, where 0 means no pain, and 10 means worst imaginable pain? 0   Global Mental Health Score 14    Global Physical Health Score 17    PROMIS TOTAL - SUBSCORES 31        Patient-reported     [place] Suicide Severity Rating Scale (Lifetime/Recent)       No data to display                 Insomnia Severity Score:     Referral:   Patient was referred to [facility] by primary care provider.    Reason for referral: clarify behavioral health diagnosis and determine behavioral health treatment options.      [facility] introduced self and role. Discussed informed consent and limits to confidentiality.     Presenting Concerns/ Current Stressors:   Patient reports morning routines with morning exercise, working on housework, outside  house projects, waking up early in the morning/night.      Insomnia Index: 14  Sleepiness Index: 6  STOP BAN  No restless legs          Sleep pattern change when care taking for father, waking up 3 or more x per night. Continued pattern of waking up at night since    Therapeutic Interventions:  CBTI    Response to treatment interventions:   Patient was receptive to interventions utilized.  Patient was engaged in the therapy process.      Safety Issues and Plan for Safety and Risk Management:     Patient denies a history of suicidal ideation, suicide attempts, self-injurious behavior, homicidal ideation, homicidal behavior, and and other safety concerns   Patient denies current fears or concerns for personal safety.   Patient denies current or recent suicidal ideation or behaviors.   Patient denies current or recent homicidal ideation or behaviors.   Patient denies current or recent self injurious behavior or ideation.   Patient denies other safety concerns.   Recommended that patient call 911 or go to the local ED should there be a change in any of these risk factors   Patient reports there are no firearms in the house.       ASSESSMENT:   Mental Status:     Appearance:   Appropriate    Eye Contact:   Good    Psychomotor Behavior: Normal    Attitude:   Cooperative    Orientation:   All   Speech Rate / Production: Normal/ Responsive   Volume:   Normal    Mood:    Normal   Affect:    Appropriate    Thought Content:  Clear    Thought Form:  Coherent    Insight:    Good         Diagnostic Criteria:   Emili Man meets diagnostic criteria for Chronic Insomnia Disorder due to a predominant complaint of dissatisfaction with sleep quality or quantity for at least 3 months occurring at least 3 days per week associated with Difficulty maintaining sleep and Early morning awakening with inability to return to sleep.    The sleep disturbance causes clinically significant distress or impairment in social, occupational,  cognitive, behavioral or other important areas of functioning.    The sleep difficulty occurs despite adequate opportunity for sleep. The insomnia is not better explained by and does not occur exclusively during the course of another sleep disorder.    Co-existing mental health or medical conditio(s) do not adequately explain the predominant complaint of insomnia.    The insomnia is not attributable to the physiological effects of a substance (e.g. A drug of abuse or medication).          DSM5 Diagnoses: (Sustained by DSM5 Criteria Listed Above)     Diagnoses: Psychophysiological Insomnia    Psychosocial / Contextual Factors: insomnia       Collateral Reports Completed:   Routed note to PCP        PLAN: (Homework, other):     1. Patient was provided:  recommendation to schedule follow-up with [facility]     2. Provider recommended the following referrals: [facility] for CBTI.        3. Suicide Risk and Safety Concerns were assessed for Emili Man    Safety Plan:   Patient denied any current/recent/lifetime history of suicidal ideation and/or behaviors. Recommended that patient call 911 or go to the local ED should there be a change in any of these risk factors       Yunior VAZQUEZ, [facility]   May 14, 2025

## 2025-05-14 NOTE — PATIENT INSTRUCTIONS
[facility] Brief CBT-I  Introductory Module    Understanding Sleep and Insomnia    Most people have trouble sleeping at some point in their life.   Chronic insomnia means you have had trouble falling asleep and/or staying asleep for at least the past three months.  Despite allowing enough time for sleep, it is affecting how you feel. You are not alone.  It is estimated that 10-15% of adults experience chronic insomnia.     Cognitive-Behavioral Therapy for Insomnia    The American College of Physicians  recommends Cognitive Behavioral Therapy for Insomnia (CBT-I) as the first-line treatment for insomnia.     [facility] Brief CBT-I is a five-step program involving an initial insomnia assessment and four treatment sessions.  It is designed to be completed with the guidance of a trained health care provider. The program will teach you the skills and strategies you will need for a better night's sleep. The first step in your program involves an assessment of your insomnia and learning a bit about sleep, insomnia and CBT-I.      The Basics of Sleep    How does sleep help us?    Sleep, like food and water, is something we need every day but whose purpose is not exactly clear.  Sleep experts agree we need consistent quality sleep to function at our best. There is evidence that sleep helps maintain brain and body functions.  It helps maintain thinking ability and mood.  Sleep is a very active state that involves four stages that cycle every  minutes throughout the night. We get our deepest sleep during the first few hours of sleep.  During the last half of our sleep, we usually get the bulk of our REM (Rapid Eye Movement) sleep.  REM sleep is when most of our dreaming occurs.     Watch the following short video to learn more:  Stages of Sleep      How much sleep do we need?    Sleep needs vary from person to person. Most adults need at least 7 hours of sleep though some may need less and others more.   Sleeping too little or too much may be a health risk.  As we age, most people report their sleep gets lighter, earlier, shorter, and more restless.     What Controls Sleep?    The three things that regulate your sleep are your:     Sleep Drive  Biological Clock  Arousal System (physical and emotional states)    Together these make us feel alert during the day and promote sleep at night.    Your sleep drive depends on how long you have been awake. It is lowest when you first wake up.  Sleep drive increases as the day goes on.  The longer time you are awake the easier it is to fall asleep.  Sleeping gradually reduces your sleep drive. That is why napping in the evening or close to bed can make it harder to sleep at night.    Your biological clock promotes wakefulness during the day and sleep at night.    Adapted from Paulette et al. (2009). Evaluation and Management of Insomnia in the Psychiatric setting. Published Online: 19/1/2009; DOI: https://doi.org/10.1176/foc.7.4.nph407VH    Watch the following short video to learn more: Two  Processes of Sleep    How does sleep change with age?    Sleep usually is lighter and shorter and earlier.  Sleep may become more restless   Increased time to fall asleep and more time awake during the night    Understanding Insomnia    What is insomnia?    Insomnia occurs when you:    Have difficulty with...  Falling Asleep  Staying Asleep  Or short amount of sleep    Have adequate time for sleep  Experience daytime problems as a result of your sleep difficulties    What causes insomnia?    Some people have a greater chance of developing insomnia due to biological, psychological or social factors. These are often referred to as predisposing factors.  A host of things can trigger insomnia such as a stressful event, jet lag, working a different shift, medication, or the onset of a medical or mental health condition. These are called precipitating factors.        What maintains  "insomnia?    Short-term insomnia can become chronic because of habits or conditions that perpetuate it including:    Unhelpful sleep habits such as spending more time in bed and sleeping in on weekends to try to catch up on sleep  Stress, worry or depression  Worry or fear about your insomnia  Pain or other medical conditions  Some medications  Untreated sleep disorders    As you spend more time in bed or try forcing yourself to sleep your bed becomes linked with wakefulness.  This type of  conditioning  along with unhelpful sleep habits maintains the insomnia even when the triggering event has resolved.    Sleep and Your Arousal System    Mental activity, emotions and physical symptoms can make your brain too active to sleep by masking the strength of your sleep drive. Your brain's arousal system can triggers insomnia and plan a role in maintaining it.  Common sources of arousal include:    Worry about sleep  An active mind concerned about unfinished tasks  Anxiety, stress and depression  Pain    The Effect of Behavioral Conditioning    When you lay awake in bed over many nights, your body actually becomes trained or 'conditioned' to be awake during the night.  It makes your bed trigger alertness instead of sleepiness.  CBT-I helps strengthen the behavioral association between sleep and your bed.    Habits that HURT sleep:    Using your bed for things other than sleep and intimacy  Shifting sleep schedules from night to night  Extending time in bed  Worrying  Worrying about sleep or trying too hard to sleep  Lack of a \"wind down\" time before bed  Long or late naps  Uncomfortable sleep environment  Alcohol  Caffeine    Developing habits that HELP your sleep:    Keeping the bed for sleep and intimacy  Maintaining a consistent sleep schedule  Daily routines including time to wind down and manage worry  Managing thoughts and expectations about sleep    Common Treatments for Insomnia    Behavioral:  Changing your " "behavior and habits to improve your sleep  Sleeping Pills (Prescription and OTC)  Antidepressants   \"Alternative\" remedies (Melatonin, Ashwagandha, Chamomile, Valerian, 5-HTP etc.)    How CBT-I Works     CBT-I targets negative behavioral conditioning and habits that hurt your sleep and lead to long-term insomnia     How long will it take to work?    Research shows that making significant changes in sleep habits takes time and effort. You  may see improvement within 2-3 weeks but will need to maintain these new habits over time for the best results.  Progress is not always steady. Don't lose heart if you experience minor setbacks along the way. While sleep medications may work faster, they often come with considerable side effects and are less effective over time requiring  increasing the dose or switching to a different medication.   The evidence is clear that CBT-I is the safest and most effective long-term solution for treatment of insomnia.     Are there any side effects?    CBT-I has been shown to be a safe and effective treatment with few side effects.  The most common early side effect you may experience is a short term increase in daytime sleepiness.  It will be important for you to manage you sleepiness by avoiding driving or operating equipment if sleepy or drowsy.     Is CBT-I right for you?    CBT-I has been shown to be effective in treating insomnia across a variety of age groups and with individuals who have other health conditions.    You may be a good candidate for CBT-I if:    You believe CBT-I can help you sleep better.  You are motivated to follow a four-session behavioral treatment program based on your insomnia assessment..   You are able (or have assistance) to complete a daily sleep diary  Your Medical and/or mental health conditions are stable and treated.  You do not have untreated Sleep Apnea or Restless Leg syndrome.    There are several health conditions where CBT-I may not be " indicated:    Bipolar Disorder  Seizure Disorder  Shift-work Sleep Disorder  Sleep Walking  Night Terrors  Excessive Daytime Sleepiness    Tracking your Sleep     Sleep tracking is an essential part of your CBT-I program. There are several ways to keep track of your sleep during treatment with your health care provider. Whatever method you use, it is best to record your information first thing when you get up in the morning.    CBT-i     The CBT-I  matt is a convenient way to keep track of your sleep during treatment with your health care provider. It also has helpful information and tools that make it a great digital  to your treatment.  Download the free matt on your Apple or Android phone and watch the following video before using it:     CBT-i  Introduction      Go to the Settings section and turn on the setting Working with CBT-i Provider, Record information each morning by pressing the Sleep Diary icon. Do not not watch or monitor the clock in the middle of the night while keeping your sleep diary. You can customize your sleep dairy in the Settings section of your matt to add information such as caffeine use, alcohol use or sleep medications taken.  It will be important to gather at least a week of sleep diary information before the next step of your program.         You can email your sleep diary data to yourself prior to your visit by using the [place] User Data function found in the Settings section.  These data will be used to establish your core sleep training plan in the next module.    Consensus Sleep Diary     The Consensus Sleep Diary was developed by a team of sleep experts across the country to collect the most helpful sleep information.  This diary is available in a convenient web-based application you can use on all computer and mobile devices.  Like the CBT-I  matt, you can export and email your data to yourself and for your provider.    Copy and paste the [employer]ensus Sleep  Diary Link into your browser:  [email or web address]        [facility] Paper Sleep Diary        Mobile and Wearable Sleep Tracking Devices    There are many wearable and mobile apps that estimate the time, amount and quality of sleep.  They do so largely by recording and analyzing your body movement. Unlike a laboratory sleep study, these devices cannot accurately measure stages of sleep.  Wearable device and mobile apps can be helpful in estimating the time and amount of sleep at night. They can be used along with your CBT-i , Consensus Sleep Diary or Paper sleep diary.    CBT-I and Sleeping Pills    Sleep medications can be helpful in the short-term but often stop working in the long-term.  Sleeping pills treat symptoms and not the underlying cause of insomnia.  They also can have side effects that may last well into the day. Abruptly stopping sleeping pills can cause temporary rebound insomnia and lead to increased distress about sleeplessness.  This in turn strengthens the belief that pills are necessary for sleep.    Many patients choose to discontinue sleeping pills prior to beginning CBT-I.  However, discontinuing sleep medication is not the goal of CBT-I.  About 50% of patients end up decreasing or discontinuing their sleep medication use. You should talk to your prescribing provider before tapering or discontinuing sleep medication.     Introductory Module Homework    Keep track of your sleep every morning  until your next visit using the CBT-i  matt, [employer] Sleep Diary web-based matt, or paper sleep diary.

## 2025-06-19 DIAGNOSIS — D32.9 MENINGIOMA (H): Primary | ICD-10-CM

## 2025-07-02 ENCOUNTER — HOSPITAL ENCOUNTER (OUTPATIENT)
Dept: MRI IMAGING | Facility: HOSPITAL | Age: 85
Discharge: HOME | End: 2025-07-02
Attending: STUDENT IN AN ORGANIZED HEALTH CARE EDUCATION/TRAINING PROGRAM
Payer: COMMERCIAL

## 2025-07-02 PROCEDURE — 255N000002 HC RX 255 OP 636: Performed by: STUDENT IN AN ORGANIZED HEALTH CARE EDUCATION/TRAINING PROGRAM

## 2025-07-02 PROCEDURE — 70553 MRI BRAIN STEM W/O & W/DYE: CPT

## 2025-07-02 PROCEDURE — A9585 GADOBUTROL INJECTION: HCPCS | Performed by: STUDENT IN AN ORGANIZED HEALTH CARE EDUCATION/TRAINING PROGRAM

## 2025-07-02 RX ORDER — GADOBUTROL 604.72 MG/ML
0.1 INJECTION INTRAVENOUS ONCE
Status: COMPLETED | OUTPATIENT
Start: 2025-07-02 | End: 2025-07-02

## 2025-07-02 RX ADMIN — GADOBUTROL 6.38 ML: 604.72 INJECTION INTRAVENOUS at 14:03

## 2025-07-08 ENCOUNTER — OFFICE VISIT (OUTPATIENT)
Dept: RADIATION ONCOLOGY | Facility: HOSPITAL | Age: 85
End: 2025-07-08
Attending: STUDENT IN AN ORGANIZED HEALTH CARE EDUCATION/TRAINING PROGRAM
Payer: MEDICARE

## 2025-07-08 VITALS
WEIGHT: 138.2 LBS | DIASTOLIC BLOOD PRESSURE: 78 MMHG | OXYGEN SATURATION: 94 % | HEART RATE: 69 BPM | SYSTOLIC BLOOD PRESSURE: 141 MMHG | BODY MASS INDEX: 26.11 KG/M2

## 2025-07-08 DIAGNOSIS — D32.9 MENINGIOMA (H): Primary | ICD-10-CM

## 2025-07-08 PROCEDURE — G2211 COMPLEX E/M VISIT ADD ON: HCPCS | Performed by: STUDENT IN AN ORGANIZED HEALTH CARE EDUCATION/TRAINING PROGRAM

## 2025-07-08 PROCEDURE — 3078F DIAST BP <80 MM HG: CPT | Performed by: STUDENT IN AN ORGANIZED HEALTH CARE EDUCATION/TRAINING PROGRAM

## 2025-07-08 PROCEDURE — 3077F SYST BP >= 140 MM HG: CPT | Performed by: STUDENT IN AN ORGANIZED HEALTH CARE EDUCATION/TRAINING PROGRAM

## 2025-07-08 PROCEDURE — G0463 HOSPITAL OUTPT CLINIC VISIT: HCPCS | Performed by: STUDENT IN AN ORGANIZED HEALTH CARE EDUCATION/TRAINING PROGRAM

## 2025-07-08 PROCEDURE — 99215 OFFICE O/P EST HI 40 MIN: CPT | Performed by: STUDENT IN AN ORGANIZED HEALTH CARE EDUCATION/TRAINING PROGRAM

## 2025-07-08 PROCEDURE — 1126F AMNT PAIN NOTED NONE PRSNT: CPT | Performed by: STUDENT IN AN ORGANIZED HEALTH CARE EDUCATION/TRAINING PROGRAM

## 2025-07-08 ASSESSMENT — PAIN SCALES - GENERAL: PAINLEVEL_OUTOF10: NO PAIN (0)

## 2025-07-08 NOTE — PROGRESS NOTES
[facility] Radiation Oncology Follow Up     Patient: Emili Man  MRN: [id number]  Date of Service: 07/08/2025         DISEASE TREATED:  right para clinoid meningioma       TYPE OF RADIATION THERAPY ADMINISTERED:  SFT 2500cGy/5 fractions      INTERVAL SINCE COMPLETION OF RADIATION THERAPY: 7/2015 so 8 years since completion       SUBJECTIVE:  Ms. Man is an 84 year old female who was treated with Cyberknife stereotactic radiosurgery in 2015 for a meningioma located in right prerclinoid region 2.5x1.8x2.2 cm adjacent to chiasm, right optic nerve, encasing regional arteries and invasive locally precluding surgical intervention .      7/2/25 MRI brain:  1.  Stable appearance from prior studies 6/23/2024 and 6/28/2023. Redemonstration of stable extra-axial lobular homogeneous enhancing dural-based mass at the right cavernous sinus, paraclinoid/supraclinoid and right middle cranial fossa level compatible   with stable meningioma.     2.  Stable localized mass effect without edema of the right temporal lobe. Mild deformation of the right aspect of the optic chiasm, as before, unchanged. Correlate for possible chronic right optic chiasmatic level visual symptoms. Mild mass effect upon   the undersurface of the right frontal lobe unchanged without edema.     3.  Ventricular caliber and sulcation remains satisfactory for age and unchanged. No acute or chronic blood products. No recent infarction.     4.  Previously suggested tiny aneurysm proximal lateral wall right ICA cavernous segment on previous MRA is not well-defined on current brain MRI exam.      Lawanda returns to clinic today for routine follow-up visit approximately 10 years after undergoing fractionated radiosurgery for a right paraclinoid meningioma.  Since she was last seen in our department approximately 2 years ago, she states she has been overall doing well.  She has not had any major changes to her overall health.  She is currently seeing her primary  care physician for some new issues with lightheadedness and occasional vertigo.  She states that the symptoms started about 3 years ago and were not present at the time of her original diagnosis.  She has no other new concerns.  No visual changes.  No recent fevers, chills, nausea, vomiting.  The remainder of her review of systems is otherwise unremarkable.      Medications were reviewed and are up to date on EPIC.    The following portions of the patient's history were reviewed and updated as appropriate: allergies, current medications, past family history, past medical history, past social history, past surgical history and problem list.    Review of Systems:      General  Constitutional  Constitutional (WDL): Exceptions to WDL  EENT     Respiratory  Respiratory  Respiratory (WDL): All respiratory elements are within defined limits  Cardiovascular  Cardiovascular  Cardiovascular (WDL): Exceptions to WDL (HTN)  Gastrointestinal  Gastrointestinal  Anorexia: Absent or within normal limits  Nausea: Absent or within normal limits  Vomiting: Absent or within normal limits  Constipation: Absent or within normal limits  Diarrhea: Absent or within normal limits  Musculoskeletal  Musculoskeletal and Connective Tissue Disorders  Musculoskeletal & Connective (WDL): Exceptions to WDL  Arthralgia: Moderate pain OR limiting instrumental ADL (arthritis in hands)  Integumentary     Neurological  Neurosensory  Neurosensory (WDL): Exceptions to WDL  Peripheral Motor Neuropathy: Absent or within normal limits  Ataxia: Absent or within normal limits  Peripheral Sensory Neuropathy: Absent or within normal limits  Confusion: Absent or within normal limits  Syncope: Absent or within normal limits  Genitourinary/Reproductive     Lymphatic  Lymph System Disorders  Lymph (WDL): All lymph elements are within defined limits  Pain  Pain Score: No Pain (0)  AUA Assessment                                                              Accompanied  by  Accompanied By: self only    Objective:        PHYSICAL EXAMINATION:    BP (!) 141/78 (BP Location: Left arm, Patient Position: Sitting, Cuff Size: Adult Regular)   Pulse 69   Wt 62.7 kg (138 lb 3.2 oz)   LMP  (LMP Unknown)   SpO2 94%   BMI 26.11 kg/m      Gen: Alert, in NAD  Eyes: PERRL, EOMI, sclera anicteric  HENT     Head: NC/AT     Ears: No external auricular lesions     Nose/sinus: No rhinorrhea or epistaxis     Oropharynx: MMM, no visible oral lesions  Neck: Supple, full ROM, no LAD  Pulm: No wheezing, stridor or respiratory distress  CV: Well-perfused, no cyanosis, no pedal edema  Abdominal: Nondistended  Rectal: Deferred  : Deferred  Musculoskeletal: Normal muscle bulk and tone  Skin: Normal color and turgor  Neurologic: CN II-XII grossly intact, normal gait and station, strength 5/5 in all extremities, sensation grossly intact, coordination intact  Psychiatric: Appropriate mood and affect       Imaging: Imaging results 6 weeks:MRI Brain w & w/o contrast  Result Date: 7/2/2025  EXAM: MR BRAIN W/O AND W CONTRAST LOCATION: [facility] DATE: 7/2/2025 INDICATION: History of meningioma status post RT; follow up scan to assess stability over time. COMPARISON: Brain MR 6/23/2024 and 6/28/2023 both reviewed. CONTRAST: 6 ml Gadavist TECHNIQUE: Routine multiplanar multisequence head MRI without and with intravenous contrast. FINDINGS: INTRACRANIAL CONTENTS: No acute or subacute infarct. Stable appearance to dural based extra-axial homogeneous enhancing mass at the right cavernous sinus, medial right middle cranial fossa and right supraclinoid and paraclinoid level from prior studies, dates listed above, most compatible with stable meningioma. As before, this measures about 18 mm SI, 27 mm AP and 20 mm ML as measured series 19 image 64 and series 18 image 76. Mild deformation of the mesial right temporal lobe and undersurface of the right frontal lobe without edema noted. This lesion  is hypointense on T2, slightly hyperintense on FLAIR, and demonstrates weakly restricted diffusion likely related to inherent cellularity of this process. No hemorrhage. No enlargement of this mass from  prior study. Mass, as before, encases the right cavernous ICA flow void which is otherwise patent. Otherwise, no change from prior study with no additional mass, mass effect, acute/chronic hemorrhage, or extra-axial blood products/fluid collections. Scattered nonspecific T2/FLAIR hyperintensities within the cerebral white matter most consistent with mild chronic microvascular ischemic change. Mild generalized cerebral atrophy. No hydrocephalus. Normal position of the cerebellar tonsils. No additional pathologic enhancement is noted. SELLA: No mass within the sella turcica is evident. Supersellar component of the meningioma to the right of midline is noted with mild deformation of the optic chiasm series 20 image 73. OSSEOUS STRUCTURES/SOFT TISSUES: No evidence for a marrow infiltrative process. The major intracranial vascular flow voids are maintained, as above unchanged from prior study. ORBITS: No acute process within the orbits. Stable component of the sella/supersellar mass at the right optic chiasmatic level with mild deformation series 20 image 72 unchanged. Correlate for possible right optic chiasmatic level visual symptoms. SINUSES/MASTOIDS: No paranasal sinus mucosal disease. No middle ear or mastoid effusion. Nasopharynx is patent.     IMPRESSION: 1.  Stable appearance from prior studies 6/23/2024 and 6/28/2023. Redemonstration of stable extra-axial lobular homogeneous enhancing dural-based mass at the right cavernous sinus, paraclinoid/supraclinoid and right middle cranial fossa level compatible with stable meningioma. 2.  Stable localized mass effect without edema of the right temporal lobe. Mild deformation of the right aspect of the optic chiasm, as before, unchanged. Correlate for possible chronic  right optic chiasmatic level visual symptoms. Mild mass effect upon the undersurface of the right frontal lobe unchanged without edema. 3.  Ventricular caliber and sulcation remains satisfactory for age and unchanged. No acute or chronic blood products. No recent infarction. 4.  Previously suggested tiny aneurysm proximal lateral wall right ICA cavernous segment on previous MRA is not well-defined on current brain MRI exam.      Impression     Visit Dx:    (D32.9) Meningioma (H)  (primary encounter diagnosis)      Cancer Staging   No matching staging information was found for the patient.      Assessment & Plan:   84 year old female s/p cyberknife fSRS for a R paraclinoid meningioma. Stable residual meningioma by MRI at 10 years.    - RTC in 2 years with MRI brain    Pain Management Plan: N/A    Total time of this visit, including time spent face-to-face with patient and or via video/audio, and also in preparing for today's visit for MDM and documentation. Medical decision-making included consideration and possible diagnoses, management options, complex record review, review of diagnostic tests and information, consideration and discussion of significant complications based on comorbidities, discussion with providers involved in the care of the patient.     40 Minutes spent.     This note has been dictated using voice recognition software and as a result may contain minor grammatical errors and unintended word substitutions.       Maximino Oquendo MD  Department of Radiation Oncology   [facility] Radiation Oncology  Tel: [phone]  Page: [phone]    [facility]  [address] [place] [address]     [place] [id number] [place] [address]    CC:  Patient Care Team:  Leslie Desir MD as PCP - General (Internal Medicine)  Leslie Desir MD as Assigned PCP  Lia Ortega MD as MD (Hematology)  Fernando Beltrán MD as MD (Hematology)  Damaris Hoang MD as MD  (Radiation Oncology)  Emily Epperson AuD as Audiologist (Audiology)  Tio Haddad LMFT as Assigned Behavioral Health Provider

## 2025-07-08 NOTE — PROGRESS NOTES
"Considerations for radiation treatment   Pregnancy status: Female with documented history of menopause    Implanted Cardiac Devices: No   Any previous radiation therapy: Yes see chart    Oncology Rooming Note    2025 1:20 PM   Emili Man is a 84 year old female who presents for:    Chief Complaint   Patient presents with    Oncology Clinic Visit     Return Dr visit after Radiation Therapy       Initial Vitals: BP (!) 141/78 (BP Location: Left arm, Patient Position: Sitting, Cuff Size: Adult Regular)   Pulse 69   Wt 62.7 kg (138 lb 3.2 oz)   LMP  (LMP Unknown)   SpO2 94%   BMI 26.11 kg/m   Estimated body mass index is 26.11 kg/m  as calculated from the following:    Height as of 25: 1.549 m (5' 1\").    Weight as of this encounter: 62.7 kg (138 lb 3.2 oz). Body surface area is 1.64 meters squared.  No Pain (0) Comment: Data Unavailable   No LMP recorded (lmp unknown). Patient is postmenopausal.  Allergies reviewed: Yes  Medications reviewed: Yes    Medications: Medication refills not needed today.  Pharmacy name entered into Instant [employer]Scan: [id number] [address]    Frailty Screening:   Is the patient here for a new oncology consult visit in cancer care? 2. No    PHQ9:  Did this patient require a PHQ9?: No      Clinical concerns: Lightheaded all the time. Bi fakks, Some fatigue. No seizures or headaches. arthritis in hands \"can't make a fist anymore\". MRI results. Dr Oquendo was notified.    Damaris Andrews RN  Radiation Oncology     [address] [address]     Contact  Clinic : [phone]  Clinic Nurse Desk: [phone]  Radiation Therapists/[phone]  After Hours (On-Call): [phone]          "

## 2025-07-08 NOTE — LETTER
"7/8/2025      Emili Man  [address] [place] [address]      Dear Colleague,    Thank you for referring your patient, Emili Man, to the [facility] RADIATION ONCOLOGY [place]. Please see a copy of my visit note below.    Considerations for radiation treatment   Pregnancy status: Female with documented history of menopause    Implanted Cardiac Devices: No   Any previous radiation therapy: Yes see chart    Oncology Rooming Note    July 8, 2025 1:20 PM   Emili Man is a 84 year old female who presents for:    Chief Complaint   Patient presents with     Oncology Clinic Visit     Return Dr visit after Radiation Therapy       Initial Vitals: BP (!) 141/78 (BP Location: Left arm, Patient Position: Sitting, Cuff Size: Adult Regular)   Pulse 69   Wt 62.7 kg (138 lb 3.2 oz)   LMP  (LMP Unknown)   SpO2 94%   BMI 26.11 kg/m   Estimated body mass index is 26.11 kg/m  as calculated from the following:    Height as of 5/22/25: 1.549 m (5' 1\").    Weight as of this encounter: 62.7 kg (138 lb 3.2 oz). Body surface area is 1.64 meters squared.  No Pain (0) Comment: Data Unavailable   No LMP recorded (lmp unknown). Patient is postmenopausal.  Allergies reviewed: Yes  Medications reviewed: Yes    Medications: Medication refills not needed today.  Pharmacy name entered into [employer]: [id number] [address]    Frailty Screening:   Is the patient here for a new oncology consult visit in cancer care? 2. No    PHQ9:  Did this patient require a PHQ9?: No      Clinical concerns: Lightheaded all the time. Bi fakks, Some fatigue. No seizures or headaches. arthritis in hands \"can't make a fist anymore\". MRI results. Dr Oquendo was notified.    Damaris Andrews RN  Radiation Oncology     [facility]   [address] [place] [address]     Contact  Clinic : [phone]  Clinic Nurse Desk: [phone]  Radiation " Therapists/[phone]  After Hours (On-Call): [phone]            [facility] Radiation Oncology Follow Up     Patient: Emili Man  MRN: [id number]  Date of Service: 2025         DISEASE TREATED:  right para clinoid meningioma       TYPE OF RADIATION THERAPY ADMINISTERED:  SFT 2500cGy/5 fractions      INTERVAL SINCE COMPLETION OF RADIATION THERAPY: 2015 so 8 years since completion       SUBJECTIVE:  Ms. Man is an 84 year old female who was treated with Cyberknife stereotactic radiosurgery in  for a meningioma located in right prerclinoid region 2.5x1.8x2.2 cm adjacent to chiasm, right optic nerve, encasing regional arteries and invasive locally precluding surgical intervention .      25 MRI brain:  1.  Stable appearance from prior studies 2024 and 2023. Redemonstration of stable extra-axial lobular homogeneous enhancing dural-based mass at the right cavernous sinus, paraclinoid/supraclinoid and right middle cranial fossa level compatible   with stable meningioma.     2.  Stable localized mass effect without edema of the right temporal lobe. Mild deformation of the right aspect of the optic chiasm, as before, unchanged. Correlate for possible chronic right optic chiasmatic level visual symptoms. Mild mass effect upon   the undersurface of the right frontal lobe unchanged without edema.     3.  Ventricular caliber and sulcation remains satisfactory for age and unchanged. No acute or chronic blood products. No recent infarction.     4.  Previously suggested tiny aneurysm proximal lateral wall right ICA cavernous segment on previous MRA is not well-defined on current brain MRI exam.      Lawanda returns to clinic today for routine follow-up visit approximately 10 years after undergoing fractionated radiosurgery for a right paraclinoid meningioma.  Since she was last seen in our department approximately 2 years ago, she states she has been overall doing well.  She has  not had any major changes to her overall health.  She is currently seeing her primary care physician for some new issues with lightheadedness and occasional vertigo.  She states that the symptoms started about 3 years ago and were not present at the time of her original diagnosis.  She has no other new concerns.  No visual changes.  No recent fevers, chills, nausea, vomiting.  The remainder of her review of systems is otherwise unremarkable.      Medications were reviewed and are up to date on EPIC.    The following portions of the patient's history were reviewed and updated as appropriate: allergies, current medications, past family history, past medical history, past social history, past surgical history and problem list.    Review of Systems:      General  Constitutional  Constitutional (WDL): Exceptions to WDL  EENT     Respiratory  Respiratory  Respiratory (WDL): All respiratory elements are within defined limits  Cardiovascular  Cardiovascular  Cardiovascular (WDL): Exceptions to WDL (HTN)  Gastrointestinal  Gastrointestinal  Anorexia: Absent or within normal limits  Nausea: Absent or within normal limits  Vomiting: Absent or within normal limits  Constipation: Absent or within normal limits  Diarrhea: Absent or within normal limits  Musculoskeletal  Musculoskeletal and Connective Tissue Disorders  Musculoskeletal & Connective (WDL): Exceptions to WDL  Arthralgia: Moderate pain OR limiting instrumental ADL (arthritis in hands)  Integumentary     Neurological  Neurosensory  Neurosensory (WDL): Exceptions to WDL  Peripheral Motor Neuropathy: Absent or within normal limits  Ataxia: Absent or within normal limits  Peripheral Sensory Neuropathy: Absent or within normal limits  Confusion: Absent or within normal limits  Syncope: Absent or within normal limits  Genitourinary/Reproductive     Lymphatic  Lymph System Disorders  Lymph (WDL): All lymph elements are within defined limits  Pain  Pain Score: No Pain  (0)  AUA Assessment                                                              Accompanied by  Accompanied By: self only    Objective:        PHYSICAL EXAMINATION:    BP (!) 141/78 (BP Location: Left arm, Patient Position: Sitting, Cuff Size: Adult Regular)   Pulse 69   Wt 62.7 kg (138 lb 3.2 oz)   LMP  (LMP Unknown)   SpO2 94%   BMI 26.11 kg/m      Gen: Alert, in NAD  Eyes: PERRL, EOMI, sclera anicteric  HENT     Head: NC/AT     Ears: No external auricular lesions     Nose/sinus: No rhinorrhea or epistaxis     Oropharynx: MMM, no visible oral lesions  Neck: Supple, full ROM, no LAD  Pulm: No wheezing, stridor or respiratory distress  CV: Well-perfused, no cyanosis, no pedal edema  Abdominal: Nondistended  Rectal: Deferred  : Deferred  Musculoskeletal: Normal muscle bulk and tone  Skin: Normal color and turgor  Neurologic: CN II-XII grossly intact, normal gait and station, strength 5/5 in all extremities, sensation grossly intact, coordination intact  Psychiatric: Appropriate mood and affect       Imaging: Imaging results 6 weeks:MRI Brain w & w/o contrast  Result Date: 7/2/2025  EXAM: MR BRAIN W/O AND W CONTRAST LOCATION: [facility] DATE: 7/2/2025 INDICATION: History of meningioma status post RT; follow up scan to assess stability over time. COMPARISON: Brain MR 6/23/2024 and 6/28/2023 both reviewed. CONTRAST: 6 ml Gadavist TECHNIQUE: Routine multiplanar multisequence head MRI without and with intravenous contrast. FINDINGS: INTRACRANIAL CONTENTS: No acute or subacute infarct. Stable appearance to dural based extra-axial homogeneous enhancing mass at the right cavernous sinus, medial right middle cranial fossa and right supraclinoid and paraclinoid level from prior studies, dates listed above, most compatible with stable meningioma. As before, this measures about 18 mm SI, 27 mm AP and 20 mm ML as measured series 19 image 64 and series 18 image 76. Mild deformation of the mesial  right temporal lobe and undersurface of the right frontal lobe without edema noted. This lesion is hypointense on T2, slightly hyperintense on FLAIR, and demonstrates weakly restricted diffusion likely related to inherent cellularity of this process. No hemorrhage. No enlargement of this mass from  prior study. Mass, as before, encases the right cavernous ICA flow void which is otherwise patent. Otherwise, no change from prior study with no additional mass, mass effect, acute/chronic hemorrhage, or extra-axial blood products/fluid collections. Scattered nonspecific T2/FLAIR hyperintensities within the cerebral white matter most consistent with mild chronic microvascular ischemic change. Mild generalized cerebral atrophy. No hydrocephalus. Normal position of the cerebellar tonsils. No additional pathologic enhancement is noted. SELLA: No mass within the sella turcica is evident. Supersellar component of the meningioma to the right of midline is noted with mild deformation of the optic chiasm series 20 image 73. OSSEOUS STRUCTURES/SOFT TISSUES: No evidence for a marrow infiltrative process. The major intracranial vascular flow voids are maintained, as above unchanged from prior study. ORBITS: No acute process within the orbits. Stable component of the sella/supersellar mass at the right optic chiasmatic level with mild deformation series 20 image 72 unchanged. Correlate for possible right optic chiasmatic level visual symptoms. SINUSES/MASTOIDS: No paranasal sinus mucosal disease. No middle ear or mastoid effusion. Nasopharynx is patent.     IMPRESSION: 1.  Stable appearance from prior studies 6/23/2024 and 6/28/2023. Redemonstration of stable extra-axial lobular homogeneous enhancing dural-based mass at the right cavernous sinus, paraclinoid/supraclinoid and right middle cranial fossa level compatible with stable meningioma. 2.  Stable localized mass effect without edema of the right temporal lobe. Mild deformation  of the right aspect of the optic chiasm, as before, unchanged. Correlate for possible chronic right optic chiasmatic level visual symptoms. Mild mass effect upon the undersurface of the right frontal lobe unchanged without edema. 3.  Ventricular caliber and sulcation remains satisfactory for age and unchanged. No acute or chronic blood products. No recent infarction. 4.  Previously suggested tiny aneurysm proximal lateral wall right ICA cavernous segment on previous MRA is not well-defined on current brain MRI exam.      Impression     Visit Dx:    (D32.9) Meningioma (H)  (primary encounter diagnosis)      Cancer Staging   No matching staging information was found for the patient.      Assessment & Plan:   84 year old female s/p cyberknife fSRS for a R paraclinoid meningioma. Stable residual meningioma by MRI at 10 years.    - RTC in 2 years with MRI brain    Pain Management Plan: N/A    Total time of this visit, including time spent face-to-face with patient and or via video/audio, and also in preparing for today's visit for MDM and documentation. Medical decision-making included consideration and possible diagnoses, management options, complex record review, review of diagnostic tests and information, consideration and discussion of significant complications based on comorbidities, discussion with providers involved in the care of the patient.     40 Minutes spent.     This note has been dictated using voice recognition software and as a result may contain minor grammatical errors and unintended word substitutions.       Maximino Oquendo MD  Department of Radiation Oncology   [facility]  Tel: [phone]  Page: [phone]    [facility]  [address] [place] [address]     [address] [place] [address]    CC:  Patient Care Team:  Leslie Desir MD as PCP - General (Internal Medicine)  Leslie Desir MD as Assigned PCP  Lia Ortega MD  as MD (Hematology)  Fernando Beltrán MD as MD (Hematology)  Damaris Hoang MD as MD (Radiation Oncology)  Emily Epperson AuD as Audiologist (Audiology)  Tio Haddad LMFT as Assigned Behavioral Health Provider      Again, thank you for allowing me to participate in the care of your patient.        Sincerely,        Maximino Oquendo MD    Electronically signed

## 2025-08-25 ENCOUNTER — OFFICE VISIT (OUTPATIENT)
Dept: INTERNAL MEDICINE | Facility: CLINIC | Age: 85
End: 2025-08-25
Payer: COMMERCIAL

## 2025-08-25 VITALS
BODY MASS INDEX: 25.41 KG/M2 | HEIGHT: 62 IN | TEMPERATURE: 97.5 F | DIASTOLIC BLOOD PRESSURE: 71 MMHG | HEART RATE: 62 BPM | SYSTOLIC BLOOD PRESSURE: 122 MMHG | OXYGEN SATURATION: 98 % | WEIGHT: 138.1 LBS

## 2025-08-25 DIAGNOSIS — Z23 NEED FOR VACCINATION: ICD-10-CM

## 2025-08-25 DIAGNOSIS — N18.31 CHRONIC KIDNEY DISEASE, STAGE 3A (H): Primary | ICD-10-CM

## 2025-08-25 DIAGNOSIS — D69.3 IDIOPATHIC THROMBOCYTOPENIC PURPURA (H): ICD-10-CM

## 2025-08-25 DIAGNOSIS — H81.10 BENIGN PAROXYSMAL POSITIONAL VERTIGO, UNSPECIFIED LATERALITY: ICD-10-CM

## 2025-08-25 DIAGNOSIS — I10 HYPERTENSION, UNSPECIFIED TYPE: ICD-10-CM

## 2025-08-25 LAB
ALBUMIN UR-MCNC: NEGATIVE MG/DL
ANION GAP SERPL CALCULATED.3IONS-SCNC: 11 MMOL/L (ref 7–15)
APPEARANCE UR: CLEAR
BACTERIA #/AREA URNS HPF: ABNORMAL /HPF
BASOPHILS # BLD AUTO: <0.03 10E3/UL (ref 0–0.2)
BASOPHILS NFR BLD AUTO: 0.5 %
BILIRUB UR QL STRIP: NEGATIVE
BUN SERPL-MCNC: 24.6 MG/DL (ref 8–23)
CALCIUM SERPL-MCNC: 9.7 MG/DL (ref 8.8–10.4)
CHLORIDE SERPL-SCNC: 104 MMOL/L (ref 98–107)
COLOR UR AUTO: YELLOW
CREAT SERPL-MCNC: 1.29 MG/DL (ref 0.51–0.95)
CREAT UR-MCNC: 70.8 MG/DL
EGFRCR SERPLBLD CKD-EPI 2021: 40 ML/MIN/1.73M2
EOSINOPHIL # BLD AUTO: 0.03 10E3/UL (ref 0–0.7)
EOSINOPHIL NFR BLD AUTO: 0.8 %
ERYTHROCYTE [DISTWIDTH] IN BLOOD BY AUTOMATED COUNT: 13.5 % (ref 10–15)
GLUCOSE SERPL-MCNC: 104 MG/DL (ref 70–99)
GLUCOSE UR STRIP-MCNC: NEGATIVE MG/DL
HCO3 SERPL-SCNC: 25 MMOL/L (ref 22–29)
HCT VFR BLD AUTO: 39.8 % (ref 35–47)
HGB BLD-MCNC: 13 G/DL (ref 11.7–15.7)
HGB UR QL STRIP: ABNORMAL
HYALINE CASTS #/AREA URNS LPF: ABNORMAL /LPF
IMM GRANULOCYTES # BLD: 0.06 10E3/UL
IMM GRANULOCYTES NFR BLD: 1.6 %
KETONES UR STRIP-MCNC: NEGATIVE MG/DL
LEUKOCYTE ESTERASE UR QL STRIP: ABNORMAL
LYMPHOCYTES # BLD AUTO: 1.04 10E3/UL (ref 0.8–5.3)
LYMPHOCYTES NFR BLD AUTO: 28.5 %
MCH RBC QN AUTO: 31.1 PG (ref 26.5–33)
MCHC RBC AUTO-ENTMCNC: 32.7 G/DL (ref 31.5–36.5)
MCV RBC AUTO: 95.2 FL (ref 78–100)
MICROALBUMIN UR-MCNC: 33 MG/L
MICROALBUMIN/CREAT UR: 46.61 MG/G CR (ref 0–25)
MONOCYTES # BLD AUTO: 0.78 10E3/UL (ref 0–1.3)
MONOCYTES NFR BLD AUTO: 21.4 %
NEUTROPHILS # BLD AUTO: 1.72 10E3/UL (ref 1.6–8.3)
NEUTROPHILS NFR BLD AUTO: 47.2 %
NITRATE UR QL: NEGATIVE
NRBC # BLD AUTO: <0.03 10E3/UL
NRBC BLD AUTO-RTO: 0 /100
PH UR STRIP: 5.5 [PH] (ref 5–8)
PLATELET # BLD AUTO: 76 10E3/UL (ref 150–450)
POTASSIUM SERPL-SCNC: 4.6 MMOL/L (ref 3.4–5.3)
RBC # BLD AUTO: 4.18 10E6/UL (ref 3.8–5.2)
RBC #/AREA URNS AUTO: ABNORMAL /HPF
SODIUM SERPL-SCNC: 140 MMOL/L (ref 135–145)
SP GR UR STRIP: 1.01 (ref 1–1.03)
SQUAMOUS #/AREA URNS AUTO: ABNORMAL /LPF
UROBILINOGEN UR STRIP-ACNC: 0.2 E.U./DL
WBC # BLD AUTO: 3.65 10E3/UL (ref 4–11)
WBC #/AREA URNS AUTO: ABNORMAL /HPF

## 2025-08-25 PROCEDURE — 3078F DIAST BP <80 MM HG: CPT | Performed by: INTERNAL MEDICINE

## 2025-08-25 PROCEDURE — 99214 OFFICE O/P EST MOD 30 MIN: CPT | Performed by: INTERNAL MEDICINE

## 2025-08-25 PROCEDURE — 36415 COLL VENOUS BLD VENIPUNCTURE: CPT | Performed by: INTERNAL MEDICINE

## 2025-08-25 PROCEDURE — 81001 URINALYSIS AUTO W/SCOPE: CPT | Performed by: INTERNAL MEDICINE

## 2025-08-25 PROCEDURE — 3074F SYST BP LT 130 MM HG: CPT | Performed by: INTERNAL MEDICINE

## 2025-08-25 PROCEDURE — 82043 UR ALBUMIN QUANTITATIVE: CPT | Performed by: INTERNAL MEDICINE

## 2025-08-25 PROCEDURE — 82570 ASSAY OF URINE CREATININE: CPT | Performed by: INTERNAL MEDICINE

## 2025-08-25 PROCEDURE — 85025 COMPLETE CBC W/AUTO DIFF WBC: CPT | Performed by: INTERNAL MEDICINE

## 2025-08-25 PROCEDURE — 80048 BASIC METABOLIC PNL TOTAL CA: CPT | Performed by: INTERNAL MEDICINE

## 2025-08-26 ENCOUNTER — RESULTS FOLLOW-UP (OUTPATIENT)
Dept: INTERNAL MEDICINE | Facility: CLINIC | Age: 85
End: 2025-08-26
Payer: MEDICARE

## 2025-08-26 DIAGNOSIS — I10 ESSENTIAL (PRIMARY) HYPERTENSION: ICD-10-CM

## 2025-08-26 DIAGNOSIS — N18.4 CKD (CHRONIC KIDNEY DISEASE) STAGE 4, GFR 15-29 ML/MIN (H): Primary | ICD-10-CM

## 2025-08-26 DIAGNOSIS — D69.6 THROMBOCYTOPENIA: ICD-10-CM
